# Patient Record
Sex: MALE | HISPANIC OR LATINO | Employment: FULL TIME | URBAN - NONMETROPOLITAN AREA
[De-identification: names, ages, dates, MRNs, and addresses within clinical notes are randomized per-mention and may not be internally consistent; named-entity substitution may affect disease eponyms.]

---

## 2017-01-05 ENCOUNTER — OFFICE VISIT (OUTPATIENT)
Dept: GASTROENTEROLOGY | Facility: CLINIC | Age: 57
End: 2017-01-05

## 2017-01-05 VITALS
SYSTOLIC BLOOD PRESSURE: 156 MMHG | HEART RATE: 84 BPM | HEIGHT: 66 IN | TEMPERATURE: 98.2 F | DIASTOLIC BLOOD PRESSURE: 82 MMHG | BODY MASS INDEX: 28.77 KG/M2 | WEIGHT: 179 LBS

## 2017-01-05 DIAGNOSIS — Z00.00 HEALTHCARE MAINTENANCE: ICD-10-CM

## 2017-01-05 DIAGNOSIS — Z87.19 HISTORY OF PANCREATITIS: Primary | ICD-10-CM

## 2017-01-05 PROCEDURE — 99213 OFFICE O/P EST LOW 20 MIN: CPT | Performed by: NURSE PRACTITIONER

## 2017-01-05 NOTE — PROGRESS NOTES
[facility] GASTROENTEROLOGY - OFFICE NOTE    1/5/2017    Amadeo Mercado   1960    Chief Complaint   Patient presents with   • Pancreatitis         HISTORY OF PRESENT ILLNESS    Amadeo Mercado is a 56 y.o. male presents for f/u pancreatitis secondary to etoh.  States had 2 episodes of pancreatitis, and states last was when hospitalized 3/2016. We did see him at that time.  Was thought  most likely secondary to etoh. He says stopped drinking etoh 2-3 mo ago but then goes on to tell me his last drink of etoh was ronnie day 2016.  He is asymptomatic, no abdominal pain.  No n/v. No fever. No unintentional wt loss. Appetite is good. He is diabetic , HA1C  Recently  9.6  3 wk ago,  TG's normal, lft normal., hgb 13.1, mcv normal, wbc 4.1 ( ordered by pcp ).  Had ct abd/pelvis 7/2015 and us abdomen 3/2016. Records reviewed.       He has never had cscope.  Denies change in bowel habits, constipation, diarrhea , or rectal bleeding. No family h/o colon cancer or polyps.      Past Medical History   Diagnosis Date   • DM (diabetes mellitus)    • ED (erectile dysfunction)    • ETOH abuse    • GERD (gastroesophageal reflux disease)    • Gout    • H. pylori infection    • Hepatic steatosis    • Hyperlipidemia    • Hypertension    • Pancreatitis, alcoholic, acute        Past Surgical History   Procedure Laterality Date   • Upper gastrointestinal endoscopy  03/2016     noting gastritis, duodenitis, esophagitis. dr whitten        Outpatient Prescriptions Marked as Taking for the 1/5/17 encounter (Office Visit) with PHONG Rivero   Medication Sig Dispense Refill   • glipiZIDE (GLUCOTROL) 10 MG tablet Take 1 tablet by mouth 2 (Two) Times a Day Before Meals. 60 tablet 3   • linagliptin (TRADJENTA) 5 MG tablet tablet Take 1 tablet by mouth Daily. 30 tablet 3   • lisinopril (PRINIVIL,ZESTRIL) 20 MG tablet Take 1 tablet by mouth Daily. 30 tablet 3   • lovastatin (MEVACOR) 20 MG tablet Take 1 tablet by mouth Every Night. 30 tablet 3    • magnesium oxide (MAGOX) 400 (241.3 MG) MG tablet tablet Take 1 tablet by mouth 2 (Two) Times a Day. 60 each 3   • metFORMIN (GLUCOPHAGE) 1000 MG tablet Take 1 tablet by mouth 2 (Two) Times a Day With Meals. 60 tablet 3       No Known Allergies    Social History     Social History   • Marital status:      Spouse name: N/A   • Number of children: N/A   • Years of education: N/A     Occupational History   • Not on file.     Social History Main Topics   • Smoking status: Never Smoker   • Smokeless tobacco: Not on file   • Alcohol use Yes      Comment: occ   • Drug use: No   • Sexual activity: Not on file     Other Topics Concern   • Not on file     Social History Narrative       History reviewed. No pertinent family history.    Review of Systems   Constitutional: Negative for appetite change, chills, fatigue, fever and unexpected weight change.   HENT: Negative for sore throat and trouble swallowing.    Eyes: Negative for pain and visual disturbance.   Respiratory: Negative for cough, chest tightness, shortness of breath and wheezing.    Cardiovascular: Negative for chest pain and palpitations.   Gastrointestinal: Negative for abdominal distention, abdominal pain, anal bleeding, blood in stool, constipation, diarrhea, nausea, rectal pain and vomiting.        As mentioned in hpi   Endocrine: Negative for cold intolerance and heat intolerance.   Genitourinary: Negative for difficulty urinating, dysuria and hematuria.   Musculoskeletal: Negative for arthralgias and back pain.   Skin: Negative for color change and rash.   Neurological: Negative for dizziness, tremors, seizures, syncope, light-headedness and headaches.   Hematological: Negative for adenopathy. Does not bruise/bleed easily.   Psychiatric/Behavioral: Negative for confusion. The patient is not nervous/anxious.        Vitals:    01/05/17 1339   BP: 156/82   BP Location: Left arm   Patient Position: Sitting   Cuff Size: Adult   Pulse: 84   Temp: 98.2  "°F (36.8 °C)   Weight: 179 lb (81.2 kg)   Height: 66\" (167.6 cm)      Body mass index is 28.89 kg/(m^2).    Physical Exam   Constitutional: He is oriented to person, place, and time. He appears well-developed and well-nourished. No distress.   HENT:   Head: Normocephalic and atraumatic.   Mouth/Throat: Oropharynx is clear and moist.   Eyes: Pupils are equal, round, and reactive to light. No scleral icterus.   Neck: Normal range of motion. Neck supple. No JVD present. No thyromegaly present.   Cardiovascular: Normal rate, regular rhythm, normal heart sounds and intact distal pulses.  Exam reveals no gallop and no friction rub.    No murmur heard.  Pulmonary/Chest: Effort normal and breath sounds normal. No respiratory distress. He has no wheezes.   Abdominal: Soft. Bowel sounds are normal. He exhibits no distension and no mass. There is no tenderness. There is no rebound and no guarding.   Musculoskeletal: Normal range of motion. He exhibits no edema or deformity.   Lymphadenopathy:     He has no cervical adenopathy.   Neurological: He is alert and oriented to person, place, and time. No cranial nerve deficit.   Skin: Skin is warm and dry. No rash noted.   Psychiatric: He has a normal mood and affect. His behavior is normal.   Vitals reviewed.              ASSESSMENT AND PLAN    Amadeo was seen today for pancreatitis.    Diagnoses and all orders for this visit:    History of pancreatitis  Comments:  secondary to etoh.     Healthcare maintenance      In regards to h/o pancreatitis, he is currently asymptomatic, states last episode was when hospitalized 3/2016.   Main recommendation is to avoid etoh, also rec keep blood sugars under control/compliance, especially with fatty liver as well.he verbalizes understanding. F/u here prn. Discussed case with dr whitten and he agrees with plan.       We also discussed importance of having screening colonoscopy to try to prevent colon cancer. He verbalizes understanding and he " declines at this time.      Body mass index is 28.89 kg/(m^2).     Return if symptoms worsen or fail to improve.            PHONG Mcneill/transcription disclaimer:  Much of this encounter note is electronic transcription/translation of spoken language to printed text.  The electronic translation of spoken language may be erroneous, or at times, nonsensical words or phrases may be inadvertently transcribed.  Although I have reviewed the note for such errors, some may still exist.

## 2017-01-05 NOTE — MR AVS SNAPSHOT
[facility] GASTROENTEROLOGY  [phone]                    Amadeo Mercado   1/5/2017 1:15 PM   Office Visit    Dept Phone:  [phone]   Encounter #:  [id number]    Provider:  PHONG Rivero   Department:  [facility] GASTROENTEROLOGY                Your Full Care Plan              Your Updated Medication List          This list is accurate as of: 1/5/17  2:02 PM.  Always use your most recent med list.                allopurinol 100 MG tablet   Commonly known as:  ZYLOPRIM   Take 1 tablet by mouth Daily.       colchicine 0.6 MG tablet       glipiZIDE 10 MG tablet   Commonly known as:  GLUCOTROL   Take 1 tablet by mouth 2 (Two) Times a Day Before Meals.       linagliptin 5 MG tablet tablet   Commonly known as:  TRADJENTA   Take 1 tablet by mouth Daily.       lisinopril 20 MG tablet   Commonly known as:  PRINIVIL,ZESTRIL   Take 1 tablet by mouth Daily.       lovastatin 20 MG tablet   Commonly known as:  MEVACOR   Take 1 tablet by mouth Every Night.       magnesium oxide 400 (241.3 MG) MG tablet tablet   Commonly known as:  MAGOX   Take 1 tablet by mouth 2 (Two) Times a Day.       metFORMIN 1000 MG tablet   Commonly known as:  GLUCOPHAGE   Take 1 tablet by mouth 2 (Two) Times a Day With Meals.       tadalafil 10 MG tablet   Commonly known as:  CIALIS   Take 1 tablet by mouth Daily As Needed for erectile dysfunction.               You Were Diagnosed With        Codes Comments    History of pancreatitis    -  Primary ICD-10-CM: Z87.19  ICD-9-CM: V12.79 secondary to etoh.       Instructions     None    Patient Instructions History      Upcoming Appointments     Visit Type Date Time Department    NEW PATIENT 1/5/2017  1:15 PM [facility] GASTRO [facility]    FOLLOW UP 3/23/2017  3:30 PM [facility]      [facility] Signup     Our records indicate that you have declined Psychiatric MyChart signup. If you would like to sign up for [employer], please email [email or web address] or call  "[phone] to obtain an activation code.             Other Info from Your Visit           Your Appointments     Mar 23, 2017  3:30 PM CDT   Follow Up with Will Servin DO   [facility] (--)    [address] [place] [id number] [phone]           Arrive 15 minutes prior to appointment.              Allergies     No Known Allergies      Reason for Visit     Pancreatitis           Vital Signs     Blood Pressure Pulse Temperature Height Weight Body Mass Index    156/82 (BP Location: Left arm, Patient Position: Sitting, Cuff Size: Adult) 84 98.2 °F (36.8 °C) 66\" (167.6 cm) 179 lb (81.2 kg) 28.89 kg/m2    Smoking Status                   Never Smoker           Problems and Diagnoses Noted     History of pancreatitis    -  Primary        "

## 2017-01-06 PROBLEM — Z87.19 HISTORY OF PANCREATITIS: Status: ACTIVE | Noted: 2017-01-06

## 2017-04-04 ENCOUNTER — OFFICE VISIT (OUTPATIENT)
Dept: INTERNAL MEDICINE | Facility: CLINIC | Age: 57
End: 2017-04-04

## 2017-04-04 VITALS
BODY MASS INDEX: 28.66 KG/M2 | RESPIRATION RATE: 16 BRPM | OXYGEN SATURATION: 97 % | HEIGHT: 66 IN | SYSTOLIC BLOOD PRESSURE: 130 MMHG | HEART RATE: 90 BPM | WEIGHT: 178.3 LBS | DIASTOLIC BLOOD PRESSURE: 80 MMHG

## 2017-04-04 DIAGNOSIS — N52.9 ERECTILE DYSFUNCTION, UNSPECIFIED ERECTILE DYSFUNCTION TYPE: ICD-10-CM

## 2017-04-04 DIAGNOSIS — I10 ESSENTIAL HYPERTENSION: Primary | ICD-10-CM

## 2017-04-04 DIAGNOSIS — E78.2 MIXED HYPERLIPIDEMIA: ICD-10-CM

## 2017-04-04 DIAGNOSIS — E11.9 TYPE 2 DIABETES MELLITUS WITHOUT COMPLICATION, WITHOUT LONG-TERM CURRENT USE OF INSULIN (HCC): ICD-10-CM

## 2017-04-04 DIAGNOSIS — E11.9 DIABETES MELLITUS TYPE 2, NONINSULIN DEPENDENT (HCC): ICD-10-CM

## 2017-04-04 DIAGNOSIS — M10.00 IDIOPATHIC GOUT, UNSPECIFIED CHRONICITY, UNSPECIFIED SITE: ICD-10-CM

## 2017-04-04 LAB — HBA1C MFR BLD: 9.2 %

## 2017-04-04 PROCEDURE — 99214 OFFICE O/P EST MOD 30 MIN: CPT | Performed by: INTERNAL MEDICINE

## 2017-04-04 PROCEDURE — 83036 HEMOGLOBIN GLYCOSYLATED A1C: CPT | Performed by: INTERNAL MEDICINE

## 2017-04-04 RX ORDER — SILDENAFIL CITRATE 20 MG/1
TABLET ORAL
Qty: 30 TABLET | Refills: 1 | Status: SHIPPED | OUTPATIENT
Start: 2017-04-04 | End: 2018-07-03

## 2017-04-04 RX ORDER — LOVASTATIN 20 MG/1
20 TABLET ORAL NIGHTLY
Qty: 30 TABLET | Refills: 3 | Status: SHIPPED | OUTPATIENT
Start: 2017-04-04 | End: 2018-07-03

## 2017-04-04 RX ORDER — SILDENAFIL CITRATE 20 MG/1
TABLET ORAL
Qty: 30 TABLET | Refills: 1 | Status: SHIPPED | OUTPATIENT
Start: 2017-04-04 | End: 2017-04-04 | Stop reason: SDUPTHER

## 2017-04-04 RX ORDER — LISINOPRIL 20 MG/1
20 TABLET ORAL DAILY
Qty: 30 TABLET | Refills: 3 | Status: SHIPPED | OUTPATIENT
Start: 2017-04-04 | End: 2018-07-03

## 2017-04-04 RX ORDER — ALLOPURINOL 100 MG/1
100 TABLET ORAL DAILY
Qty: 30 TABLET | Refills: 3 | Status: SHIPPED | OUTPATIENT
Start: 2017-04-04 | End: 2018-07-03

## 2017-04-04 RX ORDER — GLIPIZIDE 10 MG/1
10 TABLET ORAL
Qty: 60 TABLET | Refills: 3 | Status: SHIPPED | OUTPATIENT
Start: 2017-04-04 | End: 2018-07-03

## 2017-04-04 NOTE — PROGRESS NOTES
CC: f/u diabetes    History:  Amadeo Mercado is a 57 y.o. male who presents today for follow-up for evaluation of the above:  He reports he has been doing reasonably well without any acute illness. He reports he has been drinking only occasionally and has been trying to cut back even further. He reports he has not been checking sugars on a regular basis, but has tolerated his medications well without side effects. He has not been adhering to a strict diet for the purpose of glycemic management. He has had issues with erectile dysfunction and has tried Cialis in the past. He wonders about the possibility of being prescribed this type of medication. He continues on lisiinopril with good contorl of his BP without side effects.    ROS:  Review of Systems   Constitutional: Negative for chills and fever.   HENT: Negative for congestion and sore throat.    Respiratory: Negative for cough and shortness of breath.    Cardiovascular: Negative for chest pain and palpitations.   Gastrointestinal: Negative for abdominal pain, constipation and nausea.   Musculoskeletal: Negative for back pain and gait problem.       Mr. Mercado  reports that he has never smoked. He does not have any smokeless tobacco history on file. He reports that he drinks alcohol. He reports that he does not use illicit drugs.      Current Outpatient Prescriptions:   •  allopurinol (ZYLOPRIM) 100 MG tablet, Take 1 tablet by mouth Daily., Disp: 30 tablet, Rfl: 3  •  colchicine 0.6 MG tablet, Take 0.6 mg by mouth Daily., Disp: , Rfl:   •  glipiZIDE (GLUCOTROL) 10 MG tablet, Take 1 tablet by mouth 2 (Two) Times a Day Before Meals., Disp: 60 tablet, Rfl: 3  •  linagliptin (TRADJENTA) 5 MG tablet tablet, Take 1 tablet by mouth Daily., Disp: 30 tablet, Rfl: 3  •  lisinopril (PRINIVIL,ZESTRIL) 20 MG tablet, Take 1 tablet by mouth Daily., Disp: 30 tablet, Rfl: 3  •  lovastatin (MEVACOR) 20 MG tablet, Take 1 tablet by mouth Every Night., Disp: 30 tablet, Rfl: 3  •   "magnesium oxide (MAGOX) 400 (241.3 MG) MG tablet tablet, Take 1 tablet by mouth 2 (Two) Times a Day., Disp: 60 each, Rfl: 3  •  metFORMIN (GLUCOPHAGE) 1000 MG tablet, Take 1 tablet by mouth 2 (Two) Times a Day With Meals., Disp: 60 tablet, Rfl: 3      OBJECTIVE:  /80 (BP Location: Left arm, Patient Position: Sitting, Cuff Size: Adult)  Pulse 90  Resp 16  Ht 66\" (167.6 cm)  Wt 178 lb 4.8 oz (80.9 kg)  SpO2 97%  BMI 28.78 kg/m2   Physical Exam   Constitutional: He is oriented to person, place, and time. He appears well-nourished. No distress.   Cardiovascular: Normal rate, regular rhythm and normal heart sounds.    No murmur heard.  Pulmonary/Chest: Effort normal and breath sounds normal. He has no wheezes.   Abdominal: Soft. There is no tenderness.   Neurological: He is alert and oriented to person, place, and time.   Psychiatric: He has a normal mood and affect.       Assessment/Plan    Diagnoses and all orders for this visit:    Essential hypertension  -     lisinopril (PRINIVIL,ZESTRIL) 20 MG tablet; Take 1 tablet by mouth Daily.  Well controlled, BP goal for age is <140/90 per JNC 8 guidelines and continue current medications     Type 2 diabetes mellitus without complication, without long-term current use of insulin  -     POC Glycosylated Hemoglobin (Hb A1C)  -     Insulin Glargine (LANTUS SOLOSTAR) 100 UNIT/ML injection pen; Inject 10 Units under the skin Every Night.  -     metFORMIN (GLUCOPHAGE) 1000 MG tablet; Take 1 tablet by mouth 2 (Two) Times a Day With Meals.  -     glipiZIDE (GLUCOTROL) 10 MG tablet; Take 1 tablet by mouth 2 (Two) Times a Day Before Meals.  -     linagliptin (TRADJENTA) 5 MG tablet tablet; Take 1 tablet by mouth Daily.  A1c is 9.2% in the office today. This is stable from previous values and uncontrolled. He states he will again try to do better with his diet, but we will prescribe Lantus at this time and he will see what the cost of this medication is. He is also given an " Rx for pen needles, glucometer and strips for the purpose of checking sugars and using insulin nightly. He is on ACE-I and statin therapy. Recommended yearly eye exam.    Erectile dysfunction, unspecified erectile dysfunction type  -     sildenafil (REVATIO) 20 MG tablet; Take 2-3 tabs PO daily prn for sexual dysfunction.  He understands he will need to pay cash for these.    Idiopathic gout, unspecified chronicity, unspecified site  -     allopurinol (ZYLOPRIM) 100 MG tablet; Take 1 tablet by mouth Daily.    Mixed hyperlipidemia  -     lovastatin (MEVACOR) 20 MG tablet; Take 1 tablet by mouth Every Night.      An After Visit Summary was printed and given to the patient at discharge.  Return in about 3 months (around 7/4/2017) for Recheck. Sooner if problems arise.         Will Servin D.O. 4/4/2017

## 2017-04-24 ENCOUNTER — APPOINTMENT (OUTPATIENT)
Dept: GENERAL RADIOLOGY | Facility: HOSPITAL | Age: 57
End: 2017-04-24

## 2017-04-24 ENCOUNTER — HOSPITAL ENCOUNTER (EMERGENCY)
Facility: HOSPITAL | Age: 57
Discharge: HOME | End: 2017-04-24
Attending: FAMILY MEDICINE | Admitting: FAMILY MEDICINE

## 2017-04-24 VITALS
TEMPERATURE: 98.1 F | DIASTOLIC BLOOD PRESSURE: 85 MMHG | HEIGHT: 66 IN | RESPIRATION RATE: 17 BRPM | HEART RATE: 87 BPM | SYSTOLIC BLOOD PRESSURE: 147 MMHG | OXYGEN SATURATION: 99 % | BODY MASS INDEX: 28.93 KG/M2 | WEIGHT: 180 LBS

## 2017-04-24 DIAGNOSIS — R07.89 CHEST WALL DISCOMFORT: Primary | ICD-10-CM

## 2017-04-24 PROCEDURE — 99283 EMERGENCY DEPT VISIT LOW MDM: CPT

## 2017-04-24 PROCEDURE — 93010 ELECTROCARDIOGRAM REPORT: CPT | Performed by: INTERNAL MEDICINE

## 2017-04-24 PROCEDURE — 71010 HC CHEST PA OR AP: CPT

## 2017-04-24 PROCEDURE — 93005 ELECTROCARDIOGRAM TRACING: CPT | Performed by: FAMILY MEDICINE

## 2017-04-24 PROCEDURE — 71120 X-RAY EXAM BREASTBONE 2/>VWS: CPT

## 2017-04-24 RX ORDER — HYDROCODONE BITARTRATE AND ACETAMINOPHEN 5; 325 MG/1; MG/1
1 TABLET ORAL EVERY 4 HOURS PRN
Qty: 12 TABLET | Refills: 0 | Status: SHIPPED | OUTPATIENT
Start: 2017-04-24 | End: 2018-07-03

## 2017-04-24 RX ORDER — HYDROCODONE BITARTRATE AND ACETAMINOPHEN 7.5; 325 MG/1; MG/1
1 TABLET ORAL ONCE
Status: COMPLETED | OUTPATIENT
Start: 2017-04-24 | End: 2017-04-24

## 2017-04-24 RX ORDER — ONDANSETRON 4 MG/1
4 TABLET, ORALLY DISINTEGRATING ORAL ONCE
Status: COMPLETED | OUTPATIENT
Start: 2017-04-24 | End: 2017-04-24

## 2017-04-24 RX ORDER — ONDANSETRON 4 MG/1
4 TABLET, ORALLY DISINTEGRATING ORAL 4 TIMES DAILY PRN
Qty: 20 TABLET | Refills: 0 | Status: SHIPPED | OUTPATIENT
Start: 2017-04-24 | End: 2018-07-03

## 2017-04-24 RX ADMIN — ONDANSETRON 4 MG: 4 TABLET, ORALLY DISINTEGRATING ORAL at 21:23

## 2017-04-24 RX ADMIN — HYDROCODONE BITARTRATE AND ACETAMINOPHEN 1 TABLET: 7.5; 325 TABLET ORAL at 21:22
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
## 2017-06-05 ENCOUNTER — HOSPITAL ENCOUNTER (EMERGENCY)
Age: 57
Discharge: HOME | End: 2017-06-05

## 2017-06-05 VITALS
SYSTOLIC BLOOD PRESSURE: 129 MMHG | OXYGEN SATURATION: 92 % | HEIGHT: 66 IN | DIASTOLIC BLOOD PRESSURE: 81 MMHG | BODY MASS INDEX: 28.93 KG/M2 | HEART RATE: 87 BPM | TEMPERATURE: 98.7 F | RESPIRATION RATE: 17 BRPM | WEIGHT: 180 LBS

## 2017-06-05 LAB
ALBUMIN SERPL-MCNC: 4.2 G/DL (ref 3.5–5.2)
ALP BLD-CCNC: 122 U/L (ref 40–130)
ALT SERPL-CCNC: 18 U/L (ref 5–41)
AMPHETAMINE SCREEN, URINE: NEGATIVE
ANION GAP SERPL CALCULATED.3IONS-SCNC: 19 MMOL/L (ref 7–19)
AST SERPL-CCNC: 22 U/L (ref 5–40)
BARBITURATE SCREEN URINE: NEGATIVE
BENZODIAZEPINE SCREEN, URINE: NEGATIVE
BILIRUB SERPL-MCNC: <0.2 MG/DL (ref 0.2–1.2)
BUN BLDV-MCNC: 10 MG/DL (ref 6–20)
CALCIUM SERPL-MCNC: 8.7 MG/DL (ref 8.6–10)
CANNABINOID SCREEN URINE: NEGATIVE
CHLORIDE BLD-SCNC: 97 MMOL/L (ref 98–111)
CO2: 23 MMOL/L (ref 22–29)
COCAINE METABOLITE SCREEN URINE: NEGATIVE
CREAT SERPL-MCNC: 0.7 MG/DL (ref 0.5–1.2)
ETHANOL: 268 MG/DL (ref 0–0.08)
GFR NON-AFRICAN AMERICAN: >60
GLUCOSE BLD-MCNC: 368 MG/DL (ref 74–109)
HCT VFR BLD CALC: 41.1 % (ref 42–52)
HEMOGLOBIN: 14.3 G/DL (ref 14–18)
Lab: NORMAL
MCH RBC QN AUTO: 32.2 PG (ref 27–31)
MCHC RBC AUTO-ENTMCNC: 34.8 G/DL (ref 33–37)
MCV RBC AUTO: 92.6 FL (ref 80–94)
OPIATE SCREEN URINE: NEGATIVE
PDW BLD-RTO: 12.6 % (ref 11.5–14.5)
PLATELET # BLD: 221 K/UL (ref 130–400)
PMV BLD AUTO: 10.2 FL (ref 9.4–12.4)
POTASSIUM SERPL-SCNC: 4.1 MMOL/L (ref 3.5–5)
RBC # BLD: 4.44 M/UL (ref 4.7–6.1)
SODIUM BLD-SCNC: 139 MMOL/L (ref 136–145)
TOTAL PROTEIN: 7 G/DL (ref 6.6–8.7)
WBC # BLD: 7.5 K/UL (ref 4.8–10.8)

## 2017-06-05 PROCEDURE — 99283 EMERGENCY DEPT VISIT LOW MDM: CPT | Performed by: NURSE PRACTITIONER

## 2017-06-05 PROCEDURE — 6360000002 HC RX W HCPCS: Performed by: NURSE PRACTITIONER

## 2017-06-05 PROCEDURE — 99284 EMERGENCY DEPT VISIT MOD MDM: CPT

## 2017-06-05 PROCEDURE — 80053 COMPREHEN METABOLIC PANEL: CPT

## 2017-06-05 PROCEDURE — 2500000003 HC RX 250 WO HCPCS: Performed by: NURSE PRACTITIONER

## 2017-06-05 PROCEDURE — 36415 COLL VENOUS BLD VENIPUNCTURE: CPT

## 2017-06-05 PROCEDURE — 85027 COMPLETE CBC AUTOMATED: CPT

## 2017-06-05 PROCEDURE — G0480 DRUG TEST DEF 1-7 CLASSES: HCPCS

## 2017-06-05 PROCEDURE — 2580000003 HC RX 258: Performed by: NURSE PRACTITIONER

## 2017-06-05 PROCEDURE — 80307 DRUG TEST PRSMV CHEM ANLYZR: CPT

## 2017-06-05 PROCEDURE — 96365 THER/PROPH/DIAG IV INF INIT: CPT

## 2017-06-05 RX ORDER — ONDANSETRON 4 MG/1
4 TABLET, ORALLY DISINTEGRATING ORAL EVERY 8 HOURS PRN
Qty: 20 TABLET | Refills: 0 | Status: SHIPPED | OUTPATIENT
Start: 2017-06-05

## 2017-06-05 RX ORDER — DIAZEPAM 5 MG/1
5 TABLET ORAL EVERY 6 HOURS PRN
Qty: 20 TABLET | Refills: 0 | Status: SHIPPED | OUTPATIENT
Start: 2017-06-05

## 2017-06-05 RX ORDER — ALLOPURINOL 100 MG/1
100 TABLET ORAL DAILY
COMMUNITY

## 2017-06-05 RX ORDER — LOVASTATIN 20 MG/1
20 TABLET ORAL NIGHTLY
COMMUNITY

## 2017-06-05 RX ORDER — LISINOPRIL 20 MG/1
20 TABLET ORAL DAILY
COMMUNITY

## 2017-06-05 RX ORDER — GLIPIZIDE 10 MG/1
10 TABLET ORAL
COMMUNITY

## 2017-06-05 RX ORDER — MAGNESIUM OXIDE 400 MG/1
400 TABLET ORAL DAILY
COMMUNITY

## 2017-06-05 RX ADMIN — FOLIC ACID: 5 INJECTION, SOLUTION INTRAMUSCULAR; INTRAVENOUS; SUBCUTANEOUS at 20:10

## 2017-06-05 ASSESSMENT — ENCOUNTER SYMPTOMS: RESPIRATORY NEGATIVE: 1

## 2018-07-03 PROCEDURE — 80061 LIPID PANEL: CPT | Performed by: NURSE PRACTITIONER

## 2018-07-03 PROCEDURE — 83036 HEMOGLOBIN GLYCOSYLATED A1C: CPT | Performed by: NURSE PRACTITIONER

## 2018-07-03 PROCEDURE — 83735 ASSAY OF MAGNESIUM: CPT | Performed by: NURSE PRACTITIONER

## 2018-07-03 PROCEDURE — 80053 COMPREHEN METABOLIC PANEL: CPT | Performed by: NURSE PRACTITIONER

## 2018-07-03 PROCEDURE — 85025 COMPLETE CBC W/AUTO DIFF WBC: CPT | Performed by: NURSE PRACTITIONER

## 2018-07-25 ENCOUNTER — TELEPHONE (OUTPATIENT)
Dept: URGENT CARE | Facility: CLINIC | Age: 58
End: 2018-07-25

## 2018-07-25 NOTE — TELEPHONE ENCOUNTER
[facility] called ([facility]) stating pt needs refill until [facility] appt on august 13th. Spoke to  and will refill mediation for one month. Called in lisinopril, baslgar insulin, lovastatin, glipizide, metformin,and allopurinol. Called in to [facility]. Notified [facility] we will refill for one month.

## 2018-07-30 ENCOUNTER — TELEPHONE (OUTPATIENT)
Dept: URGENT CARE | Facility: CLINIC | Age: 58
End: 2018-07-30

## 2018-07-30 DIAGNOSIS — M10.9 ACUTE GOUT, UNSPECIFIED CAUSE, UNSPECIFIED SITE: Primary | ICD-10-CM

## 2018-07-30 RX ORDER — COLCHICINE 0.6 MG/1
0.6 TABLET ORAL DAILY
Qty: 6 TABLET | Refills: 0 | Status: SHIPPED | OUTPATIENT
Start: 2018-07-30 | End: 2020-07-23 | Stop reason: HOSPADM

## 2018-07-30 NOTE — TELEPHONE ENCOUNTER
Laura from [facility] called saying patient is having a gout flair, she wants to know if we can call in Colchicine to [place]? Patient has pcp appt with Eileen at [facility] on August 13th. We filled all his medications for a month on the 25th.   Can we call this in or does he need to come in?

## 2018-11-03 ENCOUNTER — HOSPITAL ENCOUNTER (EMERGENCY)
Facility: HOSPITAL | Age: 58
Discharge: HOME | End: 2018-11-03
Admitting: EMERGENCY MEDICINE

## 2018-11-03 ENCOUNTER — APPOINTMENT (OUTPATIENT)
Dept: GENERAL RADIOLOGY | Facility: HOSPITAL | Age: 58
End: 2018-11-03

## 2018-11-03 VITALS
SYSTOLIC BLOOD PRESSURE: 145 MMHG | WEIGHT: 199.6 LBS | BODY MASS INDEX: 32.08 KG/M2 | RESPIRATION RATE: 16 BRPM | HEIGHT: 66 IN | TEMPERATURE: 97.8 F | DIASTOLIC BLOOD PRESSURE: 70 MMHG | HEART RATE: 90 BPM | OXYGEN SATURATION: 99 %

## 2018-11-03 DIAGNOSIS — J40 BRONCHITIS: Primary | ICD-10-CM

## 2018-11-03 DIAGNOSIS — J02.9 PHARYNGITIS, UNSPECIFIED ETIOLOGY: ICD-10-CM

## 2018-11-03 PROCEDURE — 71046 X-RAY EXAM CHEST 2 VIEWS: CPT

## 2018-11-03 PROCEDURE — 94640 AIRWAY INHALATION TREATMENT: CPT

## 2018-11-03 PROCEDURE — 94760 N-INVAS EAR/PLS OXIMETRY 1: CPT

## 2018-11-03 PROCEDURE — 94799 UNLISTED PULMONARY SVC/PX: CPT

## 2018-11-03 PROCEDURE — 99283 EMERGENCY DEPT VISIT LOW MDM: CPT

## 2018-11-03 RX ORDER — ALBUTEROL SULFATE 90 UG/1
2 AEROSOL, METERED RESPIRATORY (INHALATION) EVERY 4 HOURS PRN
Qty: 1 INHALER | Refills: 1 | Status: SHIPPED | OUTPATIENT
Start: 2018-11-03 | End: 2018-11-08

## 2018-11-03 RX ORDER — ALBUTEROL SULFATE 2.5 MG/3ML
2.5 SOLUTION RESPIRATORY (INHALATION) ONCE
Status: COMPLETED | OUTPATIENT
Start: 2018-11-03 | End: 2018-11-03

## 2018-11-03 RX ORDER — CETIRIZINE HYDROCHLORIDE 10 MG/1
10 TABLET ORAL DAILY
COMMUNITY
End: 2022-06-14

## 2018-11-03 RX ORDER — GUAIFENESIN AND DEXTROMETHORPHAN HYDROBROMIDE 600; 30 MG/1; MG/1
1 TABLET, EXTENDED RELEASE ORAL 2 TIMES DAILY PRN
Qty: 10 TABLET | Refills: 0 | Status: SHIPPED | OUTPATIENT
Start: 2018-11-03 | End: 2018-11-08

## 2018-11-03 RX ORDER — DICLOFENAC SODIUM 75 MG/1
75 TABLET, DELAYED RELEASE ORAL 2 TIMES DAILY
COMMUNITY
End: 2020-07-23 | Stop reason: HOSPADM

## 2018-11-03 RX ADMIN — ALBUTEROL SULFATE 2.5 MG: 2.5 SOLUTION RESPIRATORY (INHALATION) at 09:02

## 2018-11-03 NOTE — DISCHARGE INSTRUCTIONS
You have upper respiratory infection that has progressed to bronchitis. This is treated with inhaler and symptom control. Please take the mucinex, inhaler, and use tylenol, throat lozenges and tea to soothe your throat. Remember you are likely to have a cough for up to 2 full weeks. Please follow up with a PCP for further evaluation on Monday. Return to the ER sooner if you develop any new, worsening or other concerning sx such as:        You have difficulty breathing.  You cannot swallow fluids, soft foods, or your saliva.  You have increased swelling in your throat or neck.  You have persistent nausea and vomiting.    Follow up with one of the [facility] physician groups below to setup primary care. If you have trouble following up, please call the [facility] Nurse Line at [phone]    (Dr. Delvin Soto DO, Dr. Will Servin DO,  PHONG Carl, and PHONG Frank)  [facility], Primary Care   [address] [place] [address], [address], [place] [address] [phone]     (Dr. Karen Vargas MD, PHONG Flowers, and PHONG Garnica)  [facility], Primary Care   [address] [place] [id number] [phone]    (Dr. Nile Palafox MD and Dr. Nando Marquez MD)  [facility], Primary Care  [address], [address] [phone]    (Dr. Bogdan Carrillo MD)  [facility], Primary Care  [address] [place] [address], [address], [place], [place], [address] [phone]

## 2018-11-03 NOTE — ED PROVIDER NOTES
Subjective   58-year-old with past medical history of diabetes, presents to the emergency department with 1 day of sore throat and cough. Pt states gradual onset of nasal congestion for last few days and then developed cough 1 day ago. Pt reports small amount of green yellow production. Pt admits to mild bilateral lower rib discomfort when coughing. Pt denies cp, difficulty managing saliva, sob, fever, chills, palpitations, hempptysis. Pt also admits to beginning smoking 3 days ago. Pt has no other complaints at this time.         History provided by:  Patient   used: No    Cough   Cough characteristics:  Productive  Sputum characteristics:  Green  Severity:  Moderate  Onset quality:  Gradual  Duration:  1 day  Timing:  Constant  Context: sick contacts and upper respiratory infection    Associated symptoms: sinus congestion and sore throat    Associated symptoms: no chest pain, no chills, no diaphoresis, no ear fullness, no ear pain, no fever, no myalgias, no rhinorrhea, no shortness of breath and no wheezing        Review of Systems   Constitutional: Negative for chills, diaphoresis, fatigue and fever.   HENT: Positive for sore throat. Negative for congestion, ear pain, rhinorrhea and trouble swallowing.    Respiratory: Positive for cough. Negative for shortness of breath and wheezing.    Cardiovascular: Negative for chest pain and palpitations.   Gastrointestinal: Negative for abdominal pain, diarrhea and nausea.   Genitourinary: Negative for dysuria.   Musculoskeletal: Negative for arthralgias and myalgias.   Neurological: Negative for dizziness and numbness.   Hematological: Negative for adenopathy. Does not bruise/bleed easily.       Past Medical History:   Diagnosis Date   • DM (diabetes mellitus) (CMS/HCC)    • ED (erectile dysfunction)    • ETOH abuse    • GERD (gastroesophageal reflux disease)    • Gout    • H. pylori infection    • Hepatic steatosis    • Hyperlipidemia    • Hypertension     • Pancreatitis, alcoholic, acute        No Known Allergies    Past Surgical History:   Procedure Laterality Date   • UPPER GASTROINTESTINAL ENDOSCOPY  03/2016    noting gastritis, duodenitis, esophagitis. dr whitten        History reviewed. No pertinent family history.    Social History     Social History   • Marital status:      Social History Main Topics   • Smoking status: Never Smoker   • Smokeless tobacco: Never Used   • Alcohol use Yes      Comment: occ   • Drug use: No   • Sexual activity: Defer     Other Topics Concern   • Not on file           Objective   Physical Exam   Constitutional: He is oriented to person, place, and time. He appears well-developed and well-nourished. No distress.   HENT:   Head: Normocephalic and atraumatic.   Right Ear: Hearing, tympanic membrane, external ear and ear canal normal.   Left Ear: Hearing, tympanic membrane, external ear and ear canal normal.   Mouth/Throat: Uvula is midline and mucous membranes are normal. No trismus in the jaw. No uvula swelling. Posterior oropharyngeal erythema present. Tonsils are 0 on the right. Tonsils are 0 on the left. No tonsillar exudate.   Nasal congestion   Eyes: Pupils are equal, round, and reactive to light. Conjunctivae and EOM are normal. Right eye exhibits no discharge. Left eye exhibits no discharge. No scleral icterus.   Neck: Normal range of motion. Neck supple. No tracheal deviation present. No thyromegaly present.   Cardiovascular: Normal rate, regular rhythm, normal heart sounds and intact distal pulses.  Exam reveals no friction rub.    No murmur heard.  Pulmonary/Chest: Effort normal and breath sounds normal. No respiratory distress. He has no wheezes.   Abdominal: Soft. Bowel sounds are normal. He exhibits no distension. There is no tenderness. There is no guarding.   Neurological: He is alert and oriented to person, place, and time.   Skin: Skin is warm and dry. Capillary refill takes less than 2 seconds. He is not  diaphoretic.   Psychiatric: He has a normal mood and affect. His behavior is normal.   Nursing note and vitals reviewed.      Procedures           ED Course  ED Course as of Nov 03 0914   Sat Nov 03, 2018   0842 Will give breathing tx.   [CP]   0849 Impression     1. No radiographic evidence of acute cardiopulmonary process.        This report was finalized on 11/03/2018 08:45 by Dr. Jeremiah Bernardo MD.  Lab and Collection     XR Chest 2 View on 11/3/2018     [CP]      ED Course User Index  [CP] Jareth Patel PA-C                  MDM  Number of Diagnoses or Management Options  Bronchitis:   Pharyngitis, unspecified etiology:   Diagnosis management comments: 57 yo with cough, sore throat onset 1 day. CXR clear, no trismus, no uvula deviation, centor negative.  no indications for labs at this time. Of note, pt did admit to starting smoking 3-4 days ago. Pt will be given mucinex, albuterol, and symptomatic tx for sore throat. VSS. No acute distress. Discussed return precautions. All questions answered.        Amount and/or Complexity of Data Reviewed  Tests in the radiology section of CPT®: ordered and reviewed    Risk of Complications, Morbidity, and/or Mortality  Presenting problems: low  Diagnostic procedures: low  Management options: low    Patient Progress  Patient progress: stable        Final diagnoses:   Bronchitis   Pharyngitis, unspecified etiology            Jareth Patel PA-C  11/03/18 0914

## 2019-01-30 ENCOUNTER — TRANSCRIBE ORDERS (OUTPATIENT)
Dept: ADMINISTRATIVE | Facility: HOSPITAL | Age: 59
End: 2019-01-30

## 2019-01-30 DIAGNOSIS — R10.10 UPPER ABDOMINAL PAIN: Primary | ICD-10-CM

## 2019-01-31 ENCOUNTER — HOSPITAL ENCOUNTER (OUTPATIENT)
Dept: ULTRASOUND IMAGING | Facility: HOSPITAL | Age: 59
Discharge: HOME | End: 2019-01-31
Admitting: INTERNAL MEDICINE

## 2019-01-31 DIAGNOSIS — R10.10 UPPER ABDOMINAL PAIN: ICD-10-CM

## 2019-01-31 PROCEDURE — 76700 US EXAM ABDOM COMPLETE: CPT

## 2019-02-22 ENCOUNTER — TRANSCRIBE ORDERS (OUTPATIENT)
Dept: ADMINISTRATIVE | Facility: HOSPITAL | Age: 59
End: 2019-02-22

## 2019-02-22 DIAGNOSIS — R10.10 UPPER ABDOMINAL PAIN: Primary | ICD-10-CM

## 2019-02-27 ENCOUNTER — HOSPITAL ENCOUNTER (OUTPATIENT)
Dept: NUCLEAR MEDICINE | Facility: HOSPITAL | Age: 59
Discharge: HOME | End: 2019-02-27

## 2019-02-27 PROCEDURE — 0 TECHNETIUM TC 99M MEBROFENIN KIT: Performed by: INTERNAL MEDICINE

## 2019-02-27 PROCEDURE — 78226 HEPATOBILIARY SYSTEM IMAGING: CPT

## 2019-02-27 PROCEDURE — A9537 TC99M MEBROFENIN: HCPCS | Performed by: INTERNAL MEDICINE

## 2019-02-27 RX ORDER — KIT FOR THE PREPARATION OF TECHNETIUM TC 99M MEBROFENIN 45 MG/10ML
1 INJECTION, POWDER, LYOPHILIZED, FOR SOLUTION INTRAVENOUS
Status: COMPLETED | OUTPATIENT
Start: 2019-02-27 | End: 2019-02-27

## 2019-02-27 RX ADMIN — MEBROFENIN 1 DOSE: 45 INJECTION, POWDER, LYOPHILIZED, FOR SOLUTION INTRAVENOUS at 12:00

## 2019-04-26 ENCOUNTER — APPOINTMENT (OUTPATIENT)
Dept: LAB | Facility: HOSPITAL | Age: 59
End: 2019-04-26

## 2019-04-26 ENCOUNTER — TRANSCRIBE ORDERS (OUTPATIENT)
Dept: ADMINISTRATIVE | Facility: HOSPITAL | Age: 59
End: 2019-04-26

## 2019-04-26 DIAGNOSIS — E87.6 HYPOKALEMIA: Primary | ICD-10-CM

## 2019-04-26 LAB
ANION GAP SERPL CALCULATED.3IONS-SCNC: 9 MMOL/L (ref 4–13)
BUN BLD-MCNC: 20 MG/DL (ref 5–21)
BUN/CREAT SERPL: 24.1 (ref 7–25)
CALCIUM SPEC-SCNC: 9.5 MG/DL (ref 8.4–10.4)
CHLORIDE SERPL-SCNC: 105 MMOL/L (ref 98–110)
CO2 SERPL-SCNC: 26 MMOL/L (ref 24–31)
CREAT BLD-MCNC: 0.83 MG/DL (ref 0.5–1.4)
GFR SERPL CREATININE-BSD FRML MDRD: 95 ML/MIN/1.73
GLUCOSE BLD-MCNC: 158 MG/DL (ref 70–100)
POTASSIUM BLD-SCNC: 4.9 MMOL/L (ref 3.5–5.3)
SODIUM BLD-SCNC: 140 MMOL/L (ref 135–145)

## 2019-04-26 PROCEDURE — 36415 COLL VENOUS BLD VENIPUNCTURE: CPT | Performed by: INTERNAL MEDICINE

## 2019-04-26 PROCEDURE — 80048 BASIC METABOLIC PNL TOTAL CA: CPT | Performed by: INTERNAL MEDICINE

## 2019-06-17 ENCOUNTER — TRANSCRIBE ORDERS (OUTPATIENT)
Dept: ADMINISTRATIVE | Facility: HOSPITAL | Age: 59
End: 2019-06-17

## 2019-06-17 ENCOUNTER — APPOINTMENT (OUTPATIENT)
Dept: LAB | Facility: HOSPITAL | Age: 59
End: 2019-06-17

## 2019-06-17 DIAGNOSIS — Q89.8 HYPEREKPLEXIA: Primary | ICD-10-CM

## 2019-06-17 LAB
ANION GAP SERPL CALCULATED.3IONS-SCNC: 7 MMOL/L (ref 4–13)
BUN BLD-MCNC: 38 MG/DL (ref 5–21)
BUN/CREAT SERPL: 31.9 (ref 7–25)
CALCIUM SPEC-SCNC: 9.4 MG/DL (ref 8.4–10.4)
CHLORIDE SERPL-SCNC: 108 MMOL/L (ref 98–110)
CO2 SERPL-SCNC: 24 MMOL/L (ref 24–31)
CREAT BLD-MCNC: 1.19 MG/DL (ref 0.5–1.4)
GFR SERPL CREATININE-BSD FRML MDRD: 63 ML/MIN/1.73
GLUCOSE BLD-MCNC: 90 MG/DL (ref 70–100)
POTASSIUM BLD-SCNC: 5.8 MMOL/L (ref 3.5–5.3)
SODIUM BLD-SCNC: 139 MMOL/L (ref 135–145)

## 2019-06-17 PROCEDURE — 36415 COLL VENOUS BLD VENIPUNCTURE: CPT | Performed by: INTERNAL MEDICINE

## 2019-06-17 PROCEDURE — 80048 BASIC METABOLIC PNL TOTAL CA: CPT | Performed by: INTERNAL MEDICINE

## 2020-07-12 ENCOUNTER — HOSPITAL ENCOUNTER (INPATIENT)
Facility: HOSPITAL | Age: 60
LOS: 1 days | Discharge: HOME | End: 2020-07-13
Attending: FAMILY MEDICINE | Admitting: INTERNAL MEDICINE

## 2020-07-12 ENCOUNTER — APPOINTMENT (OUTPATIENT)
Dept: GENERAL RADIOLOGY | Facility: HOSPITAL | Age: 60
End: 2020-07-12

## 2020-07-12 DIAGNOSIS — F10.21 HISTORY OF ALCOHOLISM (HCC): ICD-10-CM

## 2020-07-12 DIAGNOSIS — N17.9 ACUTE KIDNEY INJURY (HCC): ICD-10-CM

## 2020-07-12 DIAGNOSIS — E86.0 DEHYDRATION: ICD-10-CM

## 2020-07-12 DIAGNOSIS — E86.1 HYPOVOLEMIA: Primary | ICD-10-CM

## 2020-07-12 PROBLEM — K62.5 BRBPR (BRIGHT RED BLOOD PER RECTUM): Status: ACTIVE | Noted: 2020-07-12

## 2020-07-12 LAB
ACETONE BLD QL: NEGATIVE
ALBUMIN SERPL-MCNC: 4 G/DL (ref 3.5–5.2)
ALBUMIN/GLOB SERPL: 1.7 G/DL
ALP SERPL-CCNC: 82 U/L (ref 39–117)
ALT SERPL W P-5'-P-CCNC: 24 U/L (ref 1–41)
ANION GAP SERPL CALCULATED.3IONS-SCNC: 15 MMOL/L (ref 5–15)
ANION GAP SERPL CALCULATED.3IONS-SCNC: 19 MMOL/L (ref 5–15)
APTT PPP: 25.4 SECONDS (ref 24.1–35)
ARTERIAL PATENCY WRIST A: ABNORMAL
AST SERPL-CCNC: 21 U/L (ref 1–40)
ATMOSPHERIC PRESS: 746 MMHG
BACTERIA UR QL AUTO: ABNORMAL /HPF
BASE EXCESS BLDA CALC-SCNC: -1.3 MMOL/L (ref 0–2)
BASOPHILS # BLD AUTO: 0.1 10*3/MM3 (ref 0–0.2)
BASOPHILS NFR BLD AUTO: 1.3 % (ref 0–1.5)
BDY SITE: ABNORMAL
BILIRUB SERPL-MCNC: <0.2 MG/DL (ref 0–1.2)
BILIRUB UR QL STRIP: NEGATIVE
BODY TEMPERATURE: 37 C
BUN SERPL-MCNC: 38 MG/DL (ref 6–20)
BUN SERPL-MCNC: 45 MG/DL (ref 6–20)
BUN/CREAT SERPL: 16.9 (ref 7–25)
BUN/CREAT SERPL: 18.3 (ref 7–25)
CALCIUM SPEC-SCNC: 8 MG/DL (ref 8.6–10.5)
CALCIUM SPEC-SCNC: 8.7 MG/DL (ref 8.6–10.5)
CHLORIDE SERPL-SCNC: 103 MMOL/L (ref 98–107)
CHLORIDE SERPL-SCNC: 97 MMOL/L (ref 98–107)
CK SERPL-CCNC: 118 U/L (ref 20–200)
CLARITY UR: CLEAR
CO2 SERPL-SCNC: 23 MMOL/L (ref 22–29)
CO2 SERPL-SCNC: 24 MMOL/L (ref 22–29)
COLOR UR: YELLOW
CREAT SERPL-MCNC: 2.08 MG/DL (ref 0.76–1.27)
CREAT SERPL-MCNC: 2.67 MG/DL (ref 0.76–1.27)
DEPRECATED RDW RBC AUTO: 42.4 FL (ref 37–54)
DEVELOPER EXPIRATION DATE: ABNORMAL
DEVELOPER LOT NUMBER: 169
EOSINOPHIL # BLD AUTO: 0.62 10*3/MM3 (ref 0–0.4)
EOSINOPHIL NFR BLD AUTO: 8 % (ref 0.3–6.2)
ERYTHROCYTE [DISTWIDTH] IN BLOOD BY AUTOMATED COUNT: 13 % (ref 12.3–15.4)
ETHANOL UR QL: <0.01 %
EXPIRATION DATE: 169
FECAL OCCULT BLOOD SCREEN, POC: POSITIVE
GFR SERPL CREATININE-BSD FRML MDRD: 25 ML/MIN/1.73
GFR SERPL CREATININE-BSD FRML MDRD: 33 ML/MIN/1.73
GLOBULIN UR ELPH-MCNC: 2.4 GM/DL
GLUCOSE BLDC GLUCOMTR-MCNC: 104 MG/DL (ref 70–130)
GLUCOSE BLDC GLUCOMTR-MCNC: 153 MG/DL (ref 70–130)
GLUCOSE BLDC GLUCOMTR-MCNC: 164 MG/DL (ref 70–130)
GLUCOSE SERPL-MCNC: 128 MG/DL (ref 65–99)
GLUCOSE SERPL-MCNC: 215 MG/DL (ref 65–99)
GLUCOSE UR STRIP-MCNC: NEGATIVE MG/DL
HCO3 BLDA-SCNC: 23.4 MMOL/L (ref 20–26)
HCT VFR BLD AUTO: 36.1 % (ref 37.5–51)
HGB BLD-MCNC: 12.5 G/DL (ref 13–17.7)
HGB UR QL STRIP.AUTO: NEGATIVE
IMM GRANULOCYTES # BLD AUTO: 0.04 10*3/MM3 (ref 0–0.05)
IMM GRANULOCYTES NFR BLD AUTO: 0.5 % (ref 0–0.5)
INR PPP: 1.03 (ref 0.91–1.09)
KETONES UR QL STRIP: ABNORMAL
LEUKOCYTE ESTERASE UR QL STRIP.AUTO: NEGATIVE
LIPASE SERPL-CCNC: 116 U/L (ref 13–60)
LYMPHOCYTES # BLD AUTO: 1.54 10*3/MM3 (ref 0.7–3.1)
LYMPHOCYTES NFR BLD AUTO: 19.8 % (ref 19.6–45.3)
Lab: ABNORMAL
Lab: ABNORMAL
MAGNESIUM SERPL-MCNC: 2.2 MG/DL (ref 1.6–2.6)
MCH RBC QN AUTO: 31.5 PG (ref 26.6–33)
MCHC RBC AUTO-ENTMCNC: 34.6 G/DL (ref 31.5–35.7)
MCV RBC AUTO: 90.9 FL (ref 79–97)
MODALITY: ABNORMAL
MONOCYTES # BLD AUTO: 0.73 10*3/MM3 (ref 0.1–0.9)
MONOCYTES NFR BLD AUTO: 9.4 % (ref 5–12)
NEGATIVE CONTROL: NEGATIVE
NEUTROPHILS NFR BLD AUTO: 4.76 10*3/MM3 (ref 1.7–7)
NEUTROPHILS NFR BLD AUTO: 61 % (ref 42.7–76)
NITRITE UR QL STRIP: NEGATIVE
NRBC BLD AUTO-RTO: 0 /100 WBC (ref 0–0.2)
NT-PROBNP SERPL-MCNC: 136 PG/ML (ref 0–900)
PCO2 BLDA: 38.6 MM HG (ref 35–45)
PCO2 TEMP ADJ BLD: 38.6 MM HG (ref 35–45)
PH BLDA: 7.39 PH UNITS (ref 7.35–7.45)
PH UR STRIP.AUTO: <=5 [PH] (ref 5–8)
PH, TEMP CORRECTED: 7.39 PH UNITS (ref 7.35–7.45)
PHOSPHATE SERPL-MCNC: 5.5 MG/DL (ref 2.5–4.5)
PLATELET # BLD AUTO: 218 10*3/MM3 (ref 140–450)
PMV BLD AUTO: 10.2 FL (ref 6–12)
PO2 BLDA: 60 MM HG (ref 83–108)
PO2 TEMP ADJ BLD: 60 MM HG (ref 83–108)
POSITIVE CONTROL: POSITIVE
POTASSIUM SERPL-SCNC: 3.3 MMOL/L (ref 3.5–5.2)
POTASSIUM SERPL-SCNC: 3.7 MMOL/L (ref 3.5–5.2)
POTASSIUM SERPL-SCNC: 3.7 MMOL/L (ref 3.5–5.2)
PROT SERPL-MCNC: 6.4 G/DL (ref 6–8.5)
PROT UR QL STRIP: ABNORMAL
PROTHROMBIN TIME: 13.1 SECONDS (ref 11.9–14.6)
RBC # BLD AUTO: 3.97 10*6/MM3 (ref 4.14–5.8)
RBC # UR: ABNORMAL /HPF
REF LAB TEST METHOD: ABNORMAL
SAO2 % BLDCOA: 91.3 % (ref 94–99)
SARS-COV-2 RDRP RESP QL NAA+PROBE: NOT DETECTED
SODIUM SERPL-SCNC: 139 MMOL/L (ref 136–145)
SODIUM SERPL-SCNC: 142 MMOL/L (ref 136–145)
SP GR UR STRIP: 1.02 (ref 1–1.03)
SQUAMOUS #/AREA URNS HPF: ABNORMAL /HPF
TROPONIN T SERPL-MCNC: 0.01 NG/ML (ref 0–0.03)
UROBILINOGEN UR QL STRIP: ABNORMAL
VENTILATOR MODE: ABNORMAL
WBC # BLD AUTO: 7.79 10*3/MM3 (ref 3.4–10.8)
WBC UR QL AUTO: ABNORMAL /HPF

## 2020-07-12 PROCEDURE — 71045 X-RAY EXAM CHEST 1 VIEW: CPT

## 2020-07-12 PROCEDURE — 82962 GLUCOSE BLOOD TEST: CPT

## 2020-07-12 PROCEDURE — 84132 ASSAY OF SERUM POTASSIUM: CPT | Performed by: FAMILY MEDICINE

## 2020-07-12 PROCEDURE — 93010 ELECTROCARDIOGRAM REPORT: CPT | Performed by: INTERNAL MEDICINE

## 2020-07-12 PROCEDURE — 82270 OCCULT BLOOD FECES: CPT | Performed by: PHYSICIAN ASSISTANT

## 2020-07-12 PROCEDURE — 85610 PROTHROMBIN TIME: CPT | Performed by: PHYSICIAN ASSISTANT

## 2020-07-12 PROCEDURE — 81001 URINALYSIS AUTO W/SCOPE: CPT | Performed by: PHYSICIAN ASSISTANT

## 2020-07-12 PROCEDURE — 25010000002 LORAZEPAM PER 2 MG: Performed by: EMERGENCY MEDICINE

## 2020-07-12 PROCEDURE — 82009 KETONE BODYS QUAL: CPT | Performed by: PHYSICIAN ASSISTANT

## 2020-07-12 PROCEDURE — 80053 COMPREHEN METABOLIC PANEL: CPT | Performed by: PHYSICIAN ASSISTANT

## 2020-07-12 PROCEDURE — 87635 SARS-COV-2 COVID-19 AMP PRB: CPT | Performed by: PHYSICIAN ASSISTANT

## 2020-07-12 PROCEDURE — 83735 ASSAY OF MAGNESIUM: CPT | Performed by: FAMILY MEDICINE

## 2020-07-12 PROCEDURE — 82803 BLOOD GASES ANY COMBINATION: CPT

## 2020-07-12 PROCEDURE — 25010000002 THIAMINE PER 100 MG: Performed by: PHYSICIAN ASSISTANT

## 2020-07-12 PROCEDURE — 84484 ASSAY OF TROPONIN QUANT: CPT | Performed by: PHYSICIAN ASSISTANT

## 2020-07-12 PROCEDURE — 83690 ASSAY OF LIPASE: CPT | Performed by: PHYSICIAN ASSISTANT

## 2020-07-12 PROCEDURE — 99284 EMERGENCY DEPT VISIT MOD MDM: CPT

## 2020-07-12 PROCEDURE — 36600 WITHDRAWAL OF ARTERIAL BLOOD: CPT

## 2020-07-12 PROCEDURE — 82550 ASSAY OF CK (CPK): CPT | Performed by: PHYSICIAN ASSISTANT

## 2020-07-12 PROCEDURE — 85730 THROMBOPLASTIN TIME PARTIAL: CPT | Performed by: PHYSICIAN ASSISTANT

## 2020-07-12 PROCEDURE — 83880 ASSAY OF NATRIURETIC PEPTIDE: CPT | Performed by: PHYSICIAN ASSISTANT

## 2020-07-12 PROCEDURE — 84100 ASSAY OF PHOSPHORUS: CPT | Performed by: FAMILY MEDICINE

## 2020-07-12 PROCEDURE — 80307 DRUG TEST PRSMV CHEM ANLYZR: CPT | Performed by: PHYSICIAN ASSISTANT

## 2020-07-12 PROCEDURE — 85025 COMPLETE CBC W/AUTO DIFF WBC: CPT | Performed by: PHYSICIAN ASSISTANT

## 2020-07-12 PROCEDURE — 93005 ELECTROCARDIOGRAM TRACING: CPT | Performed by: PHYSICIAN ASSISTANT

## 2020-07-12 RX ORDER — LORAZEPAM 2 MG/ML
2 INJECTION INTRAMUSCULAR
Status: DISCONTINUED | OUTPATIENT
Start: 2020-07-12 | End: 2020-07-13 | Stop reason: HOSPADM

## 2020-07-12 RX ORDER — POTASSIUM CHLORIDE 1.5 G/1.77G
40 POWDER, FOR SOLUTION ORAL AS NEEDED
Status: DISCONTINUED | OUTPATIENT
Start: 2020-07-12 | End: 2020-07-13 | Stop reason: HOSPADM

## 2020-07-12 RX ORDER — ONDANSETRON 2 MG/ML
4 INJECTION INTRAMUSCULAR; INTRAVENOUS EVERY 6 HOURS PRN
Status: DISCONTINUED | OUTPATIENT
Start: 2020-07-12 | End: 2020-07-13 | Stop reason: HOSPADM

## 2020-07-12 RX ORDER — THIAMINE HYDROCHLORIDE 100 MG/ML
100 INJECTION, SOLUTION INTRAMUSCULAR; INTRAVENOUS ONCE
Status: COMPLETED | OUTPATIENT
Start: 2020-07-12 | End: 2020-07-12

## 2020-07-12 RX ORDER — LORAZEPAM 1 MG/1
2 TABLET ORAL
Status: DISCONTINUED | OUTPATIENT
Start: 2020-07-12 | End: 2020-07-13 | Stop reason: HOSPADM

## 2020-07-12 RX ORDER — CETIRIZINE HYDROCHLORIDE 10 MG/1
10 TABLET ORAL DAILY
Status: DISCONTINUED | OUTPATIENT
Start: 2020-07-13 | End: 2020-07-13 | Stop reason: HOSPADM

## 2020-07-12 RX ORDER — COLCHICINE 0.6 MG/1
0.6 TABLET ORAL DAILY
Status: DISCONTINUED | OUTPATIENT
Start: 2020-07-12 | End: 2020-07-13 | Stop reason: HOSPADM

## 2020-07-12 RX ORDER — SODIUM CHLORIDE 0.9 % (FLUSH) 0.9 %
10 SYRINGE (ML) INJECTION AS NEEDED
Status: DISCONTINUED | OUTPATIENT
Start: 2020-07-12 | End: 2020-07-13 | Stop reason: HOSPADM

## 2020-07-12 RX ORDER — ALLOPURINOL 100 MG/1
100 TABLET ORAL DAILY
Status: DISCONTINUED | OUTPATIENT
Start: 2020-07-12 | End: 2020-07-13 | Stop reason: HOSPADM

## 2020-07-12 RX ORDER — MAGNESIUM SULFATE HEPTAHYDRATE 40 MG/ML
2 INJECTION, SOLUTION INTRAVENOUS AS NEEDED
Status: DISCONTINUED | OUTPATIENT
Start: 2020-07-12 | End: 2020-07-13 | Stop reason: HOSPADM

## 2020-07-12 RX ORDER — BISACODYL 5 MG/1
5 TABLET, DELAYED RELEASE ORAL DAILY PRN
Status: DISCONTINUED | OUTPATIENT
Start: 2020-07-12 | End: 2020-07-13 | Stop reason: HOSPADM

## 2020-07-12 RX ORDER — POTASSIUM CHLORIDE 750 MG/1
40 CAPSULE, EXTENDED RELEASE ORAL AS NEEDED
Status: DISCONTINUED | OUTPATIENT
Start: 2020-07-12 | End: 2020-07-13 | Stop reason: HOSPADM

## 2020-07-12 RX ORDER — LORAZEPAM 2 MG/ML
1 INJECTION INTRAMUSCULAR ONCE
Status: COMPLETED | OUTPATIENT
Start: 2020-07-12 | End: 2020-07-12

## 2020-07-12 RX ORDER — TRAMADOL HYDROCHLORIDE 50 MG/1
50 TABLET ORAL EVERY 6 HOURS PRN
Status: DISCONTINUED | OUTPATIENT
Start: 2020-07-12 | End: 2020-07-13 | Stop reason: HOSPADM

## 2020-07-12 RX ORDER — DEXTROSE MONOHYDRATE 25 G/50ML
25 INJECTION, SOLUTION INTRAVENOUS
Status: DISCONTINUED | OUTPATIENT
Start: 2020-07-12 | End: 2020-07-13 | Stop reason: HOSPADM

## 2020-07-12 RX ORDER — SODIUM CHLORIDE 9 MG/ML
125 INJECTION, SOLUTION INTRAVENOUS CONTINUOUS
Status: DISCONTINUED | OUTPATIENT
Start: 2020-07-12 | End: 2020-07-13 | Stop reason: HOSPADM

## 2020-07-12 RX ORDER — SODIUM CHLORIDE 0.9 % (FLUSH) 0.9 %
10 SYRINGE (ML) INJECTION EVERY 12 HOURS SCHEDULED
Status: DISCONTINUED | OUTPATIENT
Start: 2020-07-12 | End: 2020-07-13 | Stop reason: HOSPADM

## 2020-07-12 RX ORDER — GLIPIZIDE 10 MG/1
10 TABLET ORAL
Status: DISCONTINUED | OUTPATIENT
Start: 2020-07-12 | End: 2020-07-13

## 2020-07-12 RX ORDER — MAGNESIUM SULFATE HEPTAHYDRATE 40 MG/ML
4 INJECTION, SOLUTION INTRAVENOUS AS NEEDED
Status: DISCONTINUED | OUTPATIENT
Start: 2020-07-12 | End: 2020-07-13 | Stop reason: HOSPADM

## 2020-07-12 RX ORDER — NICOTINE POLACRILEX 4 MG
15 LOZENGE BUCCAL
Status: DISCONTINUED | OUTPATIENT
Start: 2020-07-12 | End: 2020-07-13 | Stop reason: HOSPADM

## 2020-07-12 RX ORDER — ATORVASTATIN CALCIUM 10 MG/1
10 TABLET, FILM COATED ORAL NIGHTLY
Status: DISCONTINUED | OUTPATIENT
Start: 2020-07-12 | End: 2020-07-13 | Stop reason: HOSPADM

## 2020-07-12 RX ORDER — ALUMINA, MAGNESIA, AND SIMETHICONE 2400; 2400; 240 MG/30ML; MG/30ML; MG/30ML
15 SUSPENSION ORAL EVERY 6 HOURS PRN
Status: DISCONTINUED | OUTPATIENT
Start: 2020-07-12 | End: 2020-07-13 | Stop reason: HOSPADM

## 2020-07-12 RX ORDER — POTASSIUM CHLORIDE 7.45 MG/ML
10 INJECTION INTRAVENOUS
Status: DISCONTINUED | OUTPATIENT
Start: 2020-07-12 | End: 2020-07-13 | Stop reason: HOSPADM

## 2020-07-12 RX ORDER — LORAZEPAM 2 MG/ML
1 INJECTION INTRAMUSCULAR
Status: DISCONTINUED | OUTPATIENT
Start: 2020-07-12 | End: 2020-07-13 | Stop reason: HOSPADM

## 2020-07-12 RX ORDER — ACETAMINOPHEN 325 MG/1
650 TABLET ORAL EVERY 4 HOURS PRN
Status: DISCONTINUED | OUTPATIENT
Start: 2020-07-12 | End: 2020-07-13 | Stop reason: HOSPADM

## 2020-07-12 RX ORDER — LORAZEPAM 1 MG/1
1 TABLET ORAL
Status: DISCONTINUED | OUTPATIENT
Start: 2020-07-12 | End: 2020-07-13 | Stop reason: HOSPADM

## 2020-07-12 RX ADMIN — SODIUM CHLORIDE 1914 ML: 9 INJECTION, SOLUTION INTRAVENOUS at 09:39

## 2020-07-12 RX ADMIN — THIAMINE HYDROCHLORIDE 100 MG: 100 INJECTION, SOLUTION INTRAMUSCULAR; INTRAVENOUS at 10:09

## 2020-07-12 RX ADMIN — LORAZEPAM 1 MG: 2 INJECTION INTRAMUSCULAR; INTRAVENOUS at 11:02

## 2020-07-12 RX ADMIN — FOLIC ACID 1 MG: 5 INJECTION, SOLUTION INTRAMUSCULAR; INTRAVENOUS; SUBCUTANEOUS at 10:08

## 2020-07-12 RX ADMIN — POTASSIUM CHLORIDE 40 MEQ: 750 CAPSULE, EXTENDED RELEASE ORAL at 15:29

## 2020-07-12 RX ADMIN — SODIUM CHLORIDE 125 ML/HR: 9 INJECTION, SOLUTION INTRAVENOUS at 22:32

## 2020-07-12 RX ADMIN — ATORVASTATIN CALCIUM 10 MG: 10 TABLET, FILM COATED ORAL at 20:27

## 2020-07-12 RX ADMIN — GLIPIZIDE 10 MG: 10 TABLET ORAL at 17:42

## 2020-07-12 RX ADMIN — TRAMADOL HYDROCHLORIDE 50 MG: 50 TABLET, FILM COATED ORAL at 15:06

## 2020-07-12 RX ADMIN — SODIUM CHLORIDE, PRESERVATIVE FREE 10 ML: 5 INJECTION INTRAVENOUS at 20:27

## 2020-07-12 RX ADMIN — SODIUM CHLORIDE 1000 ML: 9 INJECTION, SOLUTION INTRAVENOUS at 15:30

## 2020-07-12 RX ADMIN — SODIUM CHLORIDE 125 ML/HR: 9 INJECTION, SOLUTION INTRAVENOUS at 15:06

## 2020-07-12 NOTE — PLAN OF CARE
Problem: Patient Care Overview  Goal: Plan of Care Review  Outcome: Ongoing (interventions implemented as appropriate)  Flowsheets (Taken 7/12/2020 4521)  Progress: no change  Plan of Care Reviewed With: patient  Note:   Pain pill given once for generalized pain. IVF and I liter bolus given. VSS. Continue to monitor.

## 2020-07-12 NOTE — H&P
[facility] Medicine Services  HISTORY AND PHYSICAL    Date of Admission: 7/12/2020  Primary Care Physician: Aris Walden DO    Subjective     Chief Complaint:   Weakness, low blood pressure.    History of Present Illness    This 60-year-old  male was admitted with a chief complaint of weakness and lowered blood pressure.  Patient states that he has been feeling weak with low blood pressure for the past 3 days.  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  [place].  He checked his blood pressure yesterday and again today, noting that his diastolic readings were in the 50s and 60s.  He states that he has been able to drink but has been unable to eat and has not been hungry.  The patient has a history of binge drinking but does not drink every day.  His last alcohol consumption was about 3 days ago.    Work-up in the emergency department shows a chest x-ray with no acute cardiopulmonary process, unremarkable CBC except for hemoglobin of 12.5, elevated phosphorus of 5.5, normal magnesium at 2.2, normal CK, troponin, BNP, PT and PTT and creatinine kinase.  Creatinine was elevated at 2.67 with a estimated GFR low at 25.  Repeat BMP after a brief run of IV fluids shows creatinine improved to 2.08 and estimated GFR 33.  Potassium is low at 3.3 and will be supplemented.    The patient has been given a 30 mL/kg bolus of fluids in the emergency department and he will be administered another 1 L fluid bolus and started on normal saline at 125 cc/h.  Electrolyte replacement protocol will be set in place.    Review of Systems   Constitutional: Positive for activity change and appetite change.   HENT: Negative.    Eyes: Negative.    Respiratory: Negative.    Cardiovascular: Negative.    Gastrointestinal: Negative.     Endocrine: Negative.    Genitourinary: Negative.    Musculoskeletal: Negative.    Skin: Negative.    Neurological: Positive for weakness. Negative for dizziness and light-headedness.   Hematological: Negative.    Psychiatric/Behavioral: Negative.         Otherwise complete ROS reviewed and negative except as mentioned in the HPI.      Past Medical History:     Past Medical History:   Diagnosis Date   • DM (diabetes mellitus) (CMS/HCC)    • ED (erectile dysfunction)    • ETOH abuse    • GERD (gastroesophageal reflux disease)    • Gout    • H. pylori infection    • Hepatic steatosis    • History of pancreatitis 1/6/2017    secondary to etoh.    • Hyperlipidemia    • Hypertension    • Mixed hyperlipidemia    • Pancreatitis, alcoholic, acute        Past Surgical History:  Past Surgical History:   Procedure Laterality Date   • UPPER GASTROINTESTINAL ENDOSCOPY  03/2016    noting gastritis, duodenitis, esophagitis. dr whitten        Family History:   family history includes No Known Problems in his father and mother.    Social History:    reports that he has never smoked. He has never used smokeless tobacco. He reports that he drinks alcohol. He reports that he does not use drugs.    Medications:  Prior to Admission medications    Medication Sig Start Date End Date Taking? Authorizing Provider   glipiZIDE (GLUCOTROL) 10 MG tablet Take 1 tablet by mouth 2 (Two) Times a Day Before Meals. 7/3/18  Yes Janett Delgadillo APRN   allopurinol (ZYLOPRIM) 100 MG tablet Take 1 tablet by mouth Daily. 7/3/18   Janett Delgadillo APRN   cetirizine (zyrTEC) 10 MG tablet Take 10 mg by mouth Daily.    ProviderLane MD   colchicine 0.6 MG tablet Take 1 tablet by mouth Daily. 7/30/18   David Vargas MD   diclofenac (VOLTAREN) 75 MG EC tablet Take 75 mg by mouth 2 (Two) Times a Day.    ProviderLane MD   Insulin Glargine (BASAGLAR KWIKPEN SC) Inject 15 Units under the skin into the appropriate area as directed 2 (Two)  "Times a Day.    Provider, MD Lane   Insulin Glargine (LANTUS SOLOSTAR) 100 UNIT/ML injection pen Inject 10 Units under the skin Every Night. 7/3/18   Janett Delgadillo APRN   lisinopril (PRINIVIL,ZESTRIL) 20 MG tablet Take 1 tablet by mouth Daily. 7/3/18   Janett Delgadillo APRN   lovastatin (MEVACOR) 20 MG tablet Take 1 tablet by mouth Every Night. 7/3/18   Janett Delgadillo APRN   metFORMIN (GLUCOPHAGE) 1000 MG tablet Take 1 tablet by mouth 2 (Two) Times a Day With Meals. 7/3/18   Janett Delgadillo APRN   SITagliptin (JANUVIA) 100 MG tablet Take 100 mg by mouth Daily.    Provider, MD Lane       Allergies:  No Known Allergies    Objective     Vital Signs:   /74 (BP Location: Left arm, Patient Position: Lying)   Pulse 71   Temp 97.7 °F (36.5 °C) (Oral)   Resp 18   Ht 167.6 cm (66\")   Wt 85.3 kg (188 lb)   SpO2 99%   BMI 30.34 kg/m²     Physical Exam   Constitutional: He appears well-developed and well-nourished. He is cooperative. No distress.   HENT:   Head: Normocephalic and atraumatic.   Right Ear: External ear normal.   Left Ear: External ear normal.   Nose: Nose normal.   Mouth/Throat: Oropharynx is clear and moist.   Eyes: Pupils are equal, round, and reactive to light. Conjunctivae and EOM are normal. No scleral icterus.   Neck: Normal range of motion. Neck supple. No JVD present. No tracheal deviation present.   Cardiovascular: Normal rate, regular rhythm and normal heart sounds.   No murmur heard.  Pulmonary/Chest: Effort normal and breath sounds normal. No respiratory distress.   Abdominal: Soft. Bowel sounds are normal. He exhibits no mass. There is no tenderness.   Musculoskeletal: Normal range of motion. He exhibits no edema or tenderness.   Neurological: He is alert.   Skin: Skin is warm and dry. No erythema.   Cervical tenting noted.   Psychiatric: He has a normal mood and affect. Judgment normal.         Results Reviewed:  Lab Results (last 24 hours)     Procedure Component " Value Units Date/Time    Basic Metabolic Panel [[id number]] Collected:  07/12/20 1427    Specimen:  Blood Updated:  07/12/20 1445    Phosphorus [[id number]]  (Abnormal) Collected:  07/12/20 0938    Specimen:  Blood Updated:  07/12/20 1310     Phosphorus 5.5 mg/dL     Magnesium [[id number]]  (Normal) Collected:  07/12/20 0938    Specimen:  Blood Updated:  07/12/20 1307     Magnesium 2.2 mg/dL     COVID PRE-OP / PRE-PROCEDURE SCREENING ORDER (NO ISOLATION) - Swab, Nasopharynx [[id number]] Collected:  07/12/20 1136    Specimen:  Swab from Nasopharynx Updated:  07/12/20 1219    Narrative:       The following orders were created for panel order COVID PRE-OP / PRE-PROCEDURE SCREENING ORDER (NO ISOLATION) - Swab, Nasopharynx.  Procedure                               Abnormality         Status                     ---------                               -----------         ------                     COVID-19, ABBOTT IN-HOUS...[[id number]]  Normal              Final result                 Please view results for these tests on the individual orders.    COVID-19, ABBOTT IN-HOUSE,NP Swab (NO TRANSPORT MEDIA) 2 HR TAT - Swab, Nasopharynx [[id number]]  (Normal) Collected:  07/12/20 1136    Specimen:  Swab from Nasopharynx Updated:  07/12/20 1219     COVID19 Not Detected    Narrative:       Fact sheet for providers: https://www.fda.gov/media/634611/download     Fact sheet for patients: https://www.fda.gov/media/792629/download    Ketone Bodies, Serum (Not performed at [place]) [[id number]] Collected:  07/12/20 1108    Specimen:  Blood Updated:  07/12/20 1135    Narrative:       The following orders were created for panel order Ketone Bodies, Serum (Not performed at [place]).  Procedure                               Abnormality         Status                     ---------                               -----------         ------                     Acetone[[id number]]                      Normal              Final result                      Please view results for these tests on the individual orders.    Acetone [[id number]]  (Normal) Collected:  07/12/20 1108    Specimen:  Blood Updated:  07/12/20 1135     Acetone Negative    POC Occult Blood Stool [[id number]]  (Abnormal) Collected:  07/12/20 1100    Specimen:  Stool Updated:  07/12/20 1101     Fecal Occult Blood Positive     Lot Number \11/30/2020\     Expiration Date \169\     DEVELOPER LOT NUMBER \169\     DEVELOPER EXPIRATION DATE \11/30/2020\     Positive Control Positive     Negative Control Negative    Blood Gas, Arterial [[id number]]  (Abnormal) Collected:  07/12/20 1055    Specimen:  Arterial Blood Updated:  07/12/20 1100     Site Right Brachial     Dejan's Test N/A     pH, Arterial 7.392 pH units      pCO2, Arterial 38.6 mm Hg      pO2, Arterial 60.0 mm Hg      Comment: 84 Value below reference range        HCO3, Arterial 23.4 mmol/L      Base Excess, Arterial -1.3 mmol/L      Comment: 84 Value below reference range        O2 Saturation, Arterial 91.3 %      Comment: 84 Value below reference range        Temperature 37.0 C      Barometric Pressure for Blood Gas 746 mmHg      Modality Room Air     Ventilator Mode NA     Collected by [id number]     Comment: Meter: [id number]     :  [id number]        pCO2, Temperature Corrected 38.6 mm Hg      pH, Temp Corrected 7.392 pH Units      pO2, Temperature Corrected 60.0 mm Hg     POC Glucose Once [[id number]]  (Abnormal) Collected:  07/12/20 1007    Specimen:  Blood Updated:  07/12/20 1028     Glucose 164 mg/dL      Comment: : [id number] Jimmy Estrada ([place]: [id number]       Comprehensive Metabolic Panel [[id number]]  (Abnormal) Collected:  07/12/20 0938    Specimen:  Blood Updated:  07/12/20 1018     Glucose 215 mg/dL      BUN 45 mg/dL      Creatinine 2.67 mg/dL      Sodium 139 mmol/L      Potassium 3.7 mmol/L      Chloride 97 mmol/L      CO2 23.0 mmol/L      Calcium 8.7 mg/dL      Total Protein 6.4 g/dL      Albumin 4.00  g/dL      ALT (SGPT) 24 U/L      AST (SGOT) 21 U/L      Alkaline Phosphatase 82 U/L      Total Bilirubin <0.2 mg/dL      eGFR Non African Amer 25 mL/min/1.73      Globulin 2.4 gm/dL      A/G Ratio 1.7 g/dL      BUN/Creatinine Ratio 16.9     Anion Gap 19.0 mmol/L     Narrative:       GFR Normal >60  Chronic Kidney Disease <60  Kidney Failure <15      CK [[id number]]  (Normal) Collected:  07/12/20 0938    Specimen:  Blood Updated:  07/12/20 1018     Creatine Kinase 118 U/L     Troponin [[id number]]  (Normal) Collected:  07/12/20 0938    Specimen:  Blood Updated:  07/12/20 1014     Troponin T 0.010 ng/mL     Narrative:       Troponin T Reference Range:  <= 0.03 ng/mL-   Negative for AMI  >0.03 ng/mL-     Abnormal for myocardial necrosis.  Clinicians would have to utilize clinical acumen, EKG, Troponin and serial changes to determine if it is an Acute Myocardial Infarction or myocardial injury due to an underlying chronic condition.       Results may be falsely decreased if patient taking Biotin.      BNP [[id number]]  (Normal) Collected:  07/12/20 0938    Specimen:  Blood Updated:  07/12/20 1014     proBNP 136.0 pg/mL     Narrative:       Among patients with dyspnea, NT-proBNP is highly sensitive for the detection of acute congestive heart failure. In addition NT-proBNP of <300 pg/ml effectively rules out acute congestive heart failure with 99% negative predictive value.    Results may be falsely decreased if patient taking Biotin.      Lipase [[id number]]  (Abnormal) Collected:  07/12/20 0938    Specimen:  Blood Updated:  07/12/20 1013     Lipase 116 U/L     Urinalysis, Microscopic Only - Urine, Clean Catch [[id number]]  (Abnormal) Collected:  07/12/20 0938    Specimen:  Urine, Clean Catch Updated:  07/12/20 1013     RBC, UA None Seen /HPF      WBC, UA 3-5 /HPF      Bacteria, UA Trace /HPF      Squamous Epithelial Cells, UA None Seen /HPF      Methodology Manual Light Microscopy    Ethanol [[id number]] Collected:  07/12/20  0938    Specimen:  Blood Updated:  07/12/20 1013     Ethanol % <0.010 %     Narrative:       Not for legal purposes. Chain of Custody not followed.     Protime-INR [[id number]]  (Normal) Collected:  07/12/20 0938    Specimen:  Blood Updated:  07/12/20 1007     Protime 13.1 Seconds      INR 1.03    aPTT [[id number]]  (Normal) Collected:  07/12/20 0938    Specimen:  Blood Updated:  07/12/20 1007     PTT 25.4 seconds     Urinalysis With Culture If Indicated - Urine, Clean Catch [[id number]]  (Abnormal) Collected:  07/12/20 0938    Specimen:  Urine, Clean Catch Updated:  07/12/20 1003     Color, UA Yellow     Appearance, UA Clear     pH, UA <=5.0     Specific Gravity, UA 1.023     Glucose, UA Negative     Ketones, UA Trace     Bilirubin, UA Negative     Blood, UA Negative     Protein, UA 30 mg/dL (1+)     Leuk Esterase, UA Negative     Nitrite, UA Negative     Urobilinogen, UA 0.2 E.U./dL    CBC & Differential [[id number]] Collected:  07/12/20 0938    Specimen:  Blood Updated:  07/12/20 0958    Narrative:       The following orders were created for panel order CBC & Differential.  Procedure                               Abnormality         Status                     ---------                               -----------         ------                     CBC Auto Differential[[id number]]        Abnormal            Final result                 Please view results for these tests on the individual orders.    CBC Auto Differential [[id number]]  (Abnormal) Collected:  07/12/20 0938    Specimen:  Blood Updated:  07/12/20 0958     WBC 7.79 10*3/mm3      RBC 3.97 10*6/mm3      Hemoglobin 12.5 g/dL      Hematocrit 36.1 %      MCV 90.9 fL      MCH 31.5 pg      MCHC 34.6 g/dL      RDW 13.0 %      RDW-SD 42.4 fl      MPV 10.2 fL      Platelets 218 10*3/mm3      Neutrophil % 61.0 %      Lymphocyte % 19.8 %      Monocyte % 9.4 %      Eosinophil % 8.0 %      Basophil % 1.3 %      Immature Grans % 0.5 %      Neutrophils, Absolute 4.76 10*3/mm3       Lymphocytes, Absolute 1.54 10*3/mm3      Monocytes, Absolute 0.73 10*3/mm3      Eosinophils, Absolute 0.62 10*3/mm3      Basophils, Absolute 0.10 10*3/mm3      Immature Grans, Absolute 0.04 10*3/mm3      nRBC 0.0 /100 WBC           Xr Chest 1 View    Result Date: 7/12/2020  Narrative: Frontal upright radiograph of the chest 7/12/2020 9:06 AM CDT  COMPARISON: 11/03/2019  HISTORY: Weakness.  FINDINGS: The lungs are clear. The cardiomediastinal silhouette and pulmonary vascularity are within normal limits.  The osseous structures and surrounding soft tissues demonstrate no acute abnormality.      Impression: 1. No radiographic evidence of acute cardiopulmonary process.   This report was finalized on 07/12/2020 09:18 by Dr. Jeremiah Bernardo MD.     I have personally reviewed and interpreted the radiology studies and ECG obtained at time of admission.     Assessment / Plan      Assessment & Plan  Active Hospital Problems    Diagnosis   • **GANESH (acute kidney injury) (CMS/HCC)   • BRBPR (bright red blood per rectum)   • Dehydration   • GERD (gastroesophageal reflux disease)   • ETOH abuse   • Essential hypertension   • Type 2 diabetes mellitus without complication, without long-term current use of insulin (CMS/HCC)       PLAN:   Additional 1 L normal saline bolus x1  NS at 125 cc/h  Electrolyte replacement protocol  BMP, CBC, magnesium in a.m. and daily thereafter  CIWA protocol for PRN Ativan  Sliding scale insulin    Code Status:   Full code  Surrogate decision-maker is the patient's wife     I discussed the patient's findings and my recommendations with: The patient    Estimated length of stay: 2 to 3 days    Parveen Zuleta DO   07/12/20   15:00

## 2020-07-12 NOTE — ED PROVIDER NOTES
Subjective   History of Present Illness    Patient is a pleasant 60-year-old  gentleman with chief complaint of feeling weak with low blood pressure.  The patient is a forthcoming excellent historian.  He describes that he has a history of alcoholism and often binge drinks on tequila and beer.  The patient describes that he has been drinking frequently due to the holiday.  He believes the last time he had some alcohol is about 2 to 3 days ago.  The patient describes that he does go through withdrawal and occasions and he started to develop some tremors and shaking.  [place]  He reports that he has been on the heat frequently in the past several days as well.  He was pushing a lot of water but sweated a significant amount.  When he went home, he describes that he laid out in the air conditioning but noticed blood pressure was about 90/50.  He typically runs about 140-150 systolic.  The patient also is a diabetic and is on oral medication with insulin.  His glucose yesterday was 279.  When he rechecked it this morning, it is measured at 190.  He had taken his oral medication but not his insulin this morning.  He also had measured his blood pressure is repetitively 90/60.  With feeling weak and being hypotensive, he came to the ER to be further evaluated.    Patient also has a history of alcoholic pancreatitis.  His last episode was several years ago.  He denies any known history of ulcers.  He describes that with his appetite decreasing, the only thing can usually tolerate during withdrawals are salsa.  The patient has noted 3 episodes of bright red blood in his stool yesterday but has not checked anytime before.  He is not sure that actually blood or salsa.  He denies any associate abdominal pain.    Patient denies any change in urinary output.  He describes as putting on liquids but still feels dehydrated.  He denies any fever, chest pain, shortness of breath.    Review of  Systems   Constitutional: Positive for activity change, diaphoresis and fatigue.   Eyes: Negative.    Respiratory: Negative for chest tightness and shortness of breath.    Cardiovascular: Negative for chest pain.   Gastrointestinal: Positive for blood in stool. Negative for abdominal pain, constipation, nausea and vomiting.   Genitourinary: Negative.  Negative for difficulty urinating.   Musculoskeletal: Negative.    Skin: Negative.    Neurological: Positive for weakness and headaches.   Psychiatric/Behavioral: Negative.    All other systems reviewed and are negative.      Past Medical History:   Diagnosis Date   • DM (diabetes mellitus) (CMS/HCC)    • ED (erectile dysfunction)    • ETOH abuse    • GERD (gastroesophageal reflux disease)    • Gout    • H. pylori infection    • Hepatic steatosis    • Hyperlipidemia    • Hypertension    • Pancreatitis, alcoholic, acute        No Known Allergies    Past Surgical History:   Procedure Laterality Date   • UPPER GASTROINTESTINAL ENDOSCOPY  03/2016    noting gastritis, duodenitis, esophagitis. dr whitten        No family history on file.    Social History     Socioeconomic History   • Marital status:      Spouse name: Not on file   • Number of children: Not on file   • Years of education: Not on file   • Highest education level: Not on file   Tobacco Use   • Smoking status: Never Smoker   • Smokeless tobacco: Never Used   Substance and Sexual Activity   • Alcohol use: Yes     Comment: occ   • Drug use: No   • Sexual activity: Defer       Prior to Admission medications    Medication Sig Start Date End Date Taking? Authorizing Provider   allopurinol (ZYLOPRIM) 100 MG tablet Take 1 tablet by mouth Daily. 7/3/18   Janett Delgadillo APRN   cetirizine (zyrTEC) 10 MG tablet Take 10 mg by mouth Daily.    Provider, MD Lane   colchicine 0.6 MG tablet Take 1 tablet by mouth Daily. 7/30/18   David Vargas MD   diclofenac (VOLTAREN) 75 MG EC tablet Take 75 mg by mouth  "2 (Two) Times a Day.    ProviderLane MD   glipiZIDE (GLUCOTROL) 10 MG tablet Take 1 tablet by mouth 2 (Two) Times a Day Before Meals. 7/3/18   Janett Delgadillo APRN   Insulin Glargine (BASAGLAR KWIKPEN SC) Inject 15 Units under the skin into the appropriate area as directed 2 (Two) Times a Day.    Lane Nguyen MD   Insulin Glargine (LANTUS SOLOSTAR) 100 UNIT/ML injection pen Inject 10 Units under the skin Every Night. 7/3/18   Janett Delgadillo APRN   lisinopril (PRINIVIL,ZESTRIL) 20 MG tablet Take 1 tablet by mouth Daily. 7/3/18   Janett Delgadillo APRN   lovastatin (MEVACOR) 20 MG tablet Take 1 tablet by mouth Every Night. 7/3/18   Janett Delgadillo APRN   metFORMIN (GLUCOPHAGE) 1000 MG tablet Take 1 tablet by mouth 2 (Two) Times a Day With Meals. 7/3/18   Janett Delgadillo APRN   SITagliptin (JANUVIA) 100 MG tablet Take 100 mg by mouth Daily.    Lane Nguyen MD       Medications   sodium chloride 0.9% - IBW for BMI > 30 bolus 1,914 mL (1,914 mL Intravenous New Bag 7/12/20 0939)   thiamine (B-1) injection 100 mg (100 mg Intramuscular Given 7/12/20 1009)   folic acid 1 mg in sodium chloride 0.9 % 50 mL IVPB (0 mg Intravenous Stopped 7/12/20 1050)   LORazepam (ATIVAN) injection 1 mg (1 mg Intravenous Given 7/12/20 1102)       /78   Pulse 75   Temp 98 °F (36.7 °C) (Oral)   Resp 18   Ht 167.6 cm (66\")   Wt 85.3 kg (188 lb)   SpO2 99%   BMI 30.34 kg/m²       Objective   Physical Exam   Constitutional: He is oriented to person, place, and time. He appears well-developed and well-nourished.   HENT:   Head: Normocephalic and atraumatic.   Right Ear: External ear normal.   Left Ear: External ear normal.   Nose: Nose normal.   Mouth/Throat: Oropharynx is clear and moist.   Eyes: Pupils are equal, round, and reactive to light. Conjunctivae and EOM are normal.   Neck: Normal range of motion. Neck supple. No tracheal deviation present.   Cardiovascular: Normal rate, regular rhythm, " normal heart sounds and intact distal pulses. Exam reveals no gallop and no friction rub.   No murmur heard.  Pulmonary/Chest: Effort normal and breath sounds normal. No respiratory distress. He has no wheezes. He has no rales. He exhibits no tenderness.   Abdominal: Soft. Bowel sounds are normal. He exhibits no distension and no mass. There is no tenderness. There is no rebound and no guarding.   Musculoskeletal: Normal range of motion. He exhibits no edema, tenderness or deformity.   Neurological: He is alert and oriented to person, place, and time. He has normal reflexes. He exhibits normal muscle tone. Coordination normal.   Skin: Skin is warm and dry. Capillary refill takes less than 2 seconds. No rash noted. No erythema. No pallor.   Psychiatric: He has a normal mood and affect. His behavior is normal. Judgment and thought content normal.   Vitals reviewed.      Procedures         Lab Results (last 24 hours)     Procedure Component Value Units Date/Time    CBC & Differential [[id number]] Collected:  07/12/20 0938    Specimen:  Blood Updated:  07/12/20 0958    Narrative:       The following orders were created for panel order CBC & Differential.  Procedure                               Abnormality         Status                     ---------                               -----------         ------                     CBC Auto Differential[[id number]]        Abnormal            Final result                 Please view results for these tests on the individual orders.    Comprehensive Metabolic Panel [[id number]]  (Abnormal) Collected:  07/12/20 0938    Specimen:  Blood Updated:  07/12/20 1018     Glucose 215 mg/dL      BUN 45 mg/dL      Creatinine 2.67 mg/dL      Sodium 139 mmol/L      Potassium 3.7 mmol/L      Chloride 97 mmol/L      CO2 23.0 mmol/L      Calcium 8.7 mg/dL      Total Protein 6.4 g/dL      Albumin 4.00 g/dL      ALT (SGPT) 24 U/L      AST (SGOT) 21 U/L      Alkaline Phosphatase 82 U/L      Total  Bilirubin <0.2 mg/dL      eGFR Non African Amer 25 mL/min/1.73      Globulin 2.4 gm/dL      A/G Ratio 1.7 g/dL      BUN/Creatinine Ratio 16.9     Anion Gap 19.0 mmol/L     Narrative:       GFR Normal >60  Chronic Kidney Disease <60  Kidney Failure <15      Urinalysis With Culture If Indicated - Urine, Clean Catch [[id number]]  (Abnormal) Collected:  07/12/20 0938    Specimen:  Urine, Clean Catch Updated:  07/12/20 1003     Color, UA Yellow     Appearance, UA Clear     pH, UA <=5.0     Specific Gravity, UA 1.023     Glucose, UA Negative     Ketones, UA Trace     Bilirubin, UA Negative     Blood, UA Negative     Protein, UA 30 mg/dL (1+)     Leuk Esterase, UA Negative     Nitrite, UA Negative     Urobilinogen, UA 0.2 E.U./dL    Lipase [[id number]]  (Abnormal) Collected:  07/12/20 0938    Specimen:  Blood Updated:  07/12/20 1013     Lipase 116 U/L     Protime-INR [[id number]]  (Normal) Collected:  07/12/20 0938    Specimen:  Blood Updated:  07/12/20 1007     Protime 13.1 Seconds      INR 1.03    aPTT [[id number]]  (Normal) Collected:  07/12/20 0938    Specimen:  Blood Updated:  07/12/20 1007     PTT 25.4 seconds     BNP [[id number]]  (Normal) Collected:  07/12/20 0938    Specimen:  Blood Updated:  07/12/20 1014     proBNP 136.0 pg/mL     Narrative:       Among patients with dyspnea, NT-proBNP is highly sensitive for the detection of acute congestive heart failure. In addition NT-proBNP of <300 pg/ml effectively rules out acute congestive heart failure with 99% negative predictive value.    Results may be falsely decreased if patient taking Biotin.      Troponin [[id number]]  (Normal) Collected:  07/12/20 0938    Specimen:  Blood Updated:  07/12/20 1014     Troponin T 0.010 ng/mL     Narrative:       Troponin T Reference Range:  <= 0.03 ng/mL-   Negative for AMI  >0.03 ng/mL-     Abnormal for myocardial necrosis.  Clinicians would have to utilize clinical acumen, EKG, Troponin and serial changes to determine if it is an Acute  Myocardial Infarction or myocardial injury due to an underlying chronic condition.       Results may be falsely decreased if patient taking Biotin.      Ethanol [[id number]] Collected:  07/12/20 0938    Specimen:  Blood Updated:  07/12/20 1013     Ethanol % <0.010 %     Narrative:       Not for legal purposes. Chain of Custody not followed.     CK [[id number]]  (Normal) Collected:  07/12/20 0938    Specimen:  Blood Updated:  07/12/20 1018     Creatine Kinase 118 U/L     CBC Auto Differential [[id number]]  (Abnormal) Collected:  07/12/20 0938    Specimen:  Blood Updated:  07/12/20 0958     WBC 7.79 10*3/mm3      RBC 3.97 10*6/mm3      Hemoglobin 12.5 g/dL      Hematocrit 36.1 %      MCV 90.9 fL      MCH 31.5 pg      MCHC 34.6 g/dL      RDW 13.0 %      RDW-SD 42.4 fl      MPV 10.2 fL      Platelets 218 10*3/mm3      Neutrophil % 61.0 %      Lymphocyte % 19.8 %      Monocyte % 9.4 %      Eosinophil % 8.0 %      Basophil % 1.3 %      Immature Grans % 0.5 %      Neutrophils, Absolute 4.76 10*3/mm3      Lymphocytes, Absolute 1.54 10*3/mm3      Monocytes, Absolute 0.73 10*3/mm3      Eosinophils, Absolute 0.62 10*3/mm3      Basophils, Absolute 0.10 10*3/mm3      Immature Grans, Absolute 0.04 10*3/mm3      nRBC 0.0 /100 WBC     Urinalysis, Microscopic Only - Urine, Clean Catch [[id number]]  (Abnormal) Collected:  07/12/20 0938    Specimen:  Urine, Clean Catch Updated:  07/12/20 1013     RBC, UA None Seen /HPF      WBC, UA 3-5 /HPF      Bacteria, UA Trace /HPF      Squamous Epithelial Cells, UA None Seen /HPF      Methodology Manual Light Microscopy    POC Glucose Once [[id number]]  (Abnormal) Collected:  07/12/20 1007    Specimen:  Blood Updated:  07/12/20 1028     Glucose 164 mg/dL      Comment: : [id number] Jaylannayelistephanie MalloyJorge A) JeannaMeter ID: [id number]       Blood Gas, Arterial [[id number]]  (Abnormal) Collected:  07/12/20 1055    Specimen:  Arterial Blood Updated:  07/12/20 1100     Site Right Brachial     Dejan's Test N/A      pH, Arterial 7.392 pH units      pCO2, Arterial 38.6 mm Hg      pO2, Arterial 60.0 mm Hg      Comment: 84 Value below reference range        HCO3, Arterial 23.4 mmol/L      Base Excess, Arterial -1.3 mmol/L      Comment: 84 Value below reference range        O2 Saturation, Arterial 91.3 %      Comment: 84 Value below reference range        Temperature 37.0 C      Barometric Pressure for Blood Gas 746 mmHg      Modality Room Air     Ventilator Mode NA     Collected by [id number]     Comment: Meter: [id number]     :  [id number]        pCO2, Temperature Corrected 38.6 mm Hg      pH, Temp Corrected 7.392 pH Units      pO2, Temperature Corrected 60.0 mm Hg     POC Occult Blood Stool [[id number]]  (Abnormal) Collected:  07/12/20 1100    Specimen:  Stool Updated:  07/12/20 1101     Fecal Occult Blood Positive     Lot Number \11/30/2020\     Expiration Date \169\     DEVELOPER LOT NUMBER \169\     DEVELOPER EXPIRATION DATE \11/30/2020\     Positive Control Positive     Negative Control Negative    Ketone Bodies, Serum (Not performed at [place]) [[id number]] Collected:  07/12/20 1108    Specimen:  Blood Updated:  07/12/20 1135    Narrative:       The following orders were created for panel order Ketone Bodies, Serum (Not performed at [place]).  Procedure                               Abnormality         Status                     ---------                               -----------         ------                     Acetone[[id number]]                      Normal              Final result                 Please view results for these tests on the individual orders.    Acetone [[id number]]  (Normal) Collected:  07/12/20 1108    Specimen:  Blood Updated:  07/12/20 1135     Acetone Negative    COVID PRE-OP / PRE-PROCEDURE SCREENING ORDER (NO ISOLATION) - Swab, Nasopharynx [[id number]] Collected:  07/12/20 1136    Specimen:  Swab from Nasopharynx Updated:  07/12/20 1145    Narrative:       The following orders were  created for panel order COVID PRE-OP / PRE-PROCEDURE SCREENING ORDER (NO ISOLATION) - Swab, Nasopharynx.  Procedure                               Abnormality         Status                     ---------                               -----------         ------                     COVID-19, [facility] IN-HOUS...[[id number]]                      In process                   Please view results for these tests on the individual orders.    COVID-19, [facility] IN-HOUSE,NP Swab (NO TRANSPORT MEDIA) 2 HR TAT - Swab, Nasopharynx [[id number]] Collected:  07/12/20 1136    Specimen:  Swab from Nasopharynx Updated:  07/12/20 1145          Xr Chest 1 View    Result Date: 7/12/2020  Narrative: Frontal upright radiograph of the chest 7/12/2020 9:06 AM CDT  COMPARISON: 11/03/2019  HISTORY: Weakness.  FINDINGS: The lungs are clear. The cardiomediastinal silhouette and pulmonary vascularity are within normal limits.  The osseous structures and surrounding soft tissues demonstrate no acute abnormality.      Impression: 1. No radiographic evidence of acute cardiopulmonary process.   This report was finalized on 07/12/2020 09:18 by Dr. Jeremiah Bernardo MD.      ED Course  ED Course as of Jul 12 1152   Sun Jul 12, 2020   1040 Patient's previous creatinine levels checked.  His creatinine was 1.19 one year ago.  Today, it is measured at 2.67.    [TK]   1147 Patient has been reassessed.  He has been educated of his laboratory data revealing acute kidney injury and dehydration.  He reports feeling better after receiving fluids.  There is no evidence of DKA.  He is heme positive below with bright red blood in his stool specimen.  He denies any abdominal pain.    [TK]   1148 His blood pressure has improved.  It was 98/79 while here initially and now measuring 140 systolic.  He has received Ativan for empiric treatment for withdrawal.    [TK]   1150 I have spoken with Dr. Zuleta, hospitalist on call. He will admit the patient under his care.     [TK]       ED Course User Index  [TK] Dena Garsia PA          [facility]    Final diagnoses:   Hypovolemia   History of alcoholism (CMS/HCC)   Dehydration   Acute kidney injury (CMS/HCC)          Dena Garsia PA  07/12/20 [id number]

## 2020-07-13 VITALS
RESPIRATION RATE: 16 BRPM | WEIGHT: 188 LBS | DIASTOLIC BLOOD PRESSURE: 69 MMHG | OXYGEN SATURATION: 94 % | BODY MASS INDEX: 30.22 KG/M2 | HEIGHT: 66 IN | SYSTOLIC BLOOD PRESSURE: 153 MMHG | HEART RATE: 56 BPM | TEMPERATURE: 97.8 F

## 2020-07-13 PROBLEM — T67.5XXA HEAT EXHAUSTION: Status: ACTIVE | Noted: 2020-07-13

## 2020-07-13 PROBLEM — K64.9 BLEEDING HEMORRHOIDS: Status: ACTIVE | Noted: 2020-07-12

## 2020-07-13 LAB
ANION GAP SERPL CALCULATED.3IONS-SCNC: 11 MMOL/L (ref 5–15)
BUN SERPL-MCNC: 17 MG/DL (ref 6–20)
BUN/CREAT SERPL: 25 (ref 7–25)
CALCIUM SPEC-SCNC: 8.2 MG/DL (ref 8.6–10.5)
CHLORIDE SERPL-SCNC: 106 MMOL/L (ref 98–107)
CO2 SERPL-SCNC: 21 MMOL/L (ref 22–29)
CREAT SERPL-MCNC: 0.68 MG/DL (ref 0.76–1.27)
GFR SERPL CREATININE-BSD FRML MDRD: 120 ML/MIN/1.73
GLUCOSE BLDC GLUCOMTR-MCNC: 150 MG/DL (ref 70–130)
GLUCOSE BLDC GLUCOMTR-MCNC: 219 MG/DL (ref 70–130)
GLUCOSE SERPL-MCNC: 222 MG/DL (ref 65–99)
MAGNESIUM SERPL-MCNC: 1.9 MG/DL (ref 1.6–2.6)
PHOSPHATE SERPL-MCNC: 2.1 MG/DL (ref 2.5–4.5)
POTASSIUM SERPL-SCNC: 4.3 MMOL/L (ref 3.5–5.2)
SODIUM SERPL-SCNC: 138 MMOL/L (ref 136–145)

## 2020-07-13 PROCEDURE — 63710000001 INSULIN LISPRO (HUMAN) PER 5 UNITS: Performed by: FAMILY MEDICINE

## 2020-07-13 PROCEDURE — 25010000003 MAGNESIUM SULFATE 4 GM/100ML SOLUTION: Performed by: FAMILY MEDICINE

## 2020-07-13 PROCEDURE — 82962 GLUCOSE BLOOD TEST: CPT

## 2020-07-13 PROCEDURE — 84100 ASSAY OF PHOSPHORUS: CPT | Performed by: FAMILY MEDICINE

## 2020-07-13 PROCEDURE — 83735 ASSAY OF MAGNESIUM: CPT | Performed by: FAMILY MEDICINE

## 2020-07-13 PROCEDURE — 80048 BASIC METABOLIC PNL TOTAL CA: CPT | Performed by: FAMILY MEDICINE

## 2020-07-13 RX ADMIN — ALLOPURINOL 100 MG: 100 TABLET ORAL at 08:03

## 2020-07-13 RX ADMIN — MAGNESIUM SULFATE IN WATER 4 G: 40 INJECTION, SOLUTION INTRAVENOUS at 08:03

## 2020-07-13 RX ADMIN — COLCHICINE 0.6 MG: 0.6 TABLET, FILM COATED ORAL at 08:03

## 2020-07-13 RX ADMIN — SODIUM CHLORIDE 125 ML/HR: 9 INJECTION, SOLUTION INTRAVENOUS at 06:10

## 2020-07-13 RX ADMIN — INSULIN LISPRO 5 UNITS: 100 INJECTION, SOLUTION INTRAVENOUS; SUBCUTANEOUS at 11:31

## 2020-07-13 RX ADMIN — INSULIN LISPRO 3 UNITS: 100 INJECTION, SOLUTION INTRAVENOUS; SUBCUTANEOUS at 08:02

## 2020-07-13 RX ADMIN — LINAGLIPTIN 5 MG: 5 TABLET, FILM COATED ORAL at 08:03

## 2020-07-13 RX ADMIN — CETIRIZINE HYDROCHLORIDE 10 MG: 10 TABLET, FILM COATED ORAL at 08:03

## 2020-07-13 NOTE — DISCHARGE SUMMARY
[facility] Medicine Services  DISCHARGE SUMMARY       Date of Admission: 7/12/2020  Date of Discharge:  7/13/2020  Primary Care Physician: Aris Walden DO    Presenting Problem/History of Present Illness:  Weakness.    Final Discharge Diagnoses:  Active Hospital Problems    Diagnosis   • **Heat exhaustion   • GANESH (acute kidney injury) (CMS/[facility])   • Dehydration   • Alcoholism (CMS/[facility])   • Bleeding hemorrhoids   • GERD (gastroesophageal reflux disease)   • Essential hypertension   • Type 2 diabetes mellitus without complication, with long-term current use of insulin (CMS/[facility])     Consults: None.     Procedures Performed: None.     Pertinent Test Results:   Imaging Results (Last 7 Days)     Procedure Component Value Units Date/Time    XR Chest 1 View [[id number]] Collected:  07/12/20 0918     Updated:  07/12/20 0921    Narrative:       Frontal upright radiograph of the chest 7/12/2020 9:06 AM CDT     COMPARISON: 11/03/2019     HISTORY: Weakness.     FINDINGS:   The lungs are clear. The cardiomediastinal silhouette and pulmonary  vascularity are within normal limits.      The osseous structures and surrounding soft tissues demonstrate no acute  abnormality.       Impression:       1. No radiographic evidence of acute cardiopulmonary process.        This report was finalized on 07/12/2020 09:18 by Dr. Jeremiah Bernardo MD.        Lab Results (last 7 days)     Procedure Component Value Units Date/Time    Phosphorus [[id number]]  (Abnormal) Collected:  07/13/20 0825    Specimen:  Blood Updated:  07/13/20 0918     Phosphorus 2.1 mg/dL     Basic Metabolic Panel [[id number]]  (Abnormal) Collected:  07/13/20 0825    Specimen:  Blood Updated:  07/13/20 0918     Glucose 222 mg/dL      BUN 17 mg/dL      Creatinine 0.68 mg/dL      Sodium 138 mmol/L      Potassium 4.3 mmol/L      Chloride 106 mmol/L      CO2 21.0 mmol/L      Calcium 8.2 mg/dL      eGFR Non African Amer 120 mL/min/1.73       BUN/Creatinine Ratio 25.0     Anion Gap 11.0 mmol/L     Narrative:       GFR Normal >60  Chronic Kidney Disease <60  Kidney Failure <15      POC Glucose Once [[id number]]  (Abnormal) Collected:  07/13/20 0750    Specimen:  Blood Updated:  07/13/20 0801     Glucose 150 mg/dL      Comment: : [id number] Marnie JessicaMeter ID: [id number]       Magnesium [[id number]]  (Normal) Collected:  07/13/20 0532    Specimen:  Blood Updated:  07/13/20 0646     Magnesium 1.9 mg/dL     POC Glucose Once [[id number]]  (Abnormal) Collected:  07/12/20 2148    Specimen:  Blood Updated:  07/12/20 2158     Glucose 153 mg/dL      Comment: : [id number] Franco JenniferMeter ID: [id number]       Potassium [[id number]]  (Normal) Collected:  07/12/20 2017    Specimen:  Blood Updated:  07/12/20 2035     Potassium 3.7 mmol/L     POC Glucose Once [[id number]]  (Normal) Collected:  07/12/20 1709    Specimen:  Blood Updated:  07/12/20 1740     Glucose 104 mg/dL      Comment: : [id number] Rahman (Nicole) JessicaMeter ID: [id number]       Basic Metabolic Panel [[id number]]  (Abnormal) Collected:  07/12/20 1427    Specimen:  Blood Updated:  07/12/20 1501     Glucose 128 mg/dL      BUN 38 mg/dL      Creatinine 2.08 mg/dL      Sodium 142 mmol/L      Potassium 3.3 mmol/L      Chloride 103 mmol/L      CO2 24.0 mmol/L      Calcium 8.0 mg/dL      eGFR Non African Amer 33 mL/min/1.73      BUN/Creatinine Ratio 18.3     Anion Gap 15.0 mmol/L     Narrative:       GFR Normal >60  Chronic Kidney Disease <60  Kidney Failure <15      Phosphorus [[id number]]  (Abnormal) Collected:  07/12/20 0938    Specimen:  Blood Updated:  07/12/20 1310     Phosphorus 5.5 mg/dL     Magnesium [[id number]]  (Normal) Collected:  07/12/20 0938    Specimen:  Blood Updated:  07/12/20 1307     Magnesium 2.2 mg/dL     COVID PRE-OP / PRE-PROCEDURE SCREENING ORDER (NO ISOLATION) - Swab, Nasopharynx [[id number]] Collected:  07/12/20 1136    Specimen:  Swab from Nasopharynx Updated:  07/12/20  1219    Narrative:       The following orders were created for panel order COVID PRE-OP / PRE-PROCEDURE SCREENING ORDER (NO ISOLATION) - Swab, Nasopharynx.  Procedure                               Abnormality         Status                     ---------                               -----------         ------                     COVID-19, ABBOTT IN-HOUS...[[id number]]  Normal              Final result                 Please view results for these tests on the individual orders.    COVID-19, ABBOTT IN-HOUSE,NP Swab (NO TRANSPORT MEDIA) 2 HR TAT - Swab, Nasopharynx [[id number]]  (Normal) Collected:  07/12/20 1136    Specimen:  Swab from Nasopharynx Updated:  07/12/20 1219     COVID19 Not Detected    Narrative:       Fact sheet for providers: https://www.fda.gov/media/530735/download     Fact sheet for patients: https://www.fda.gov/media/526518/download    Ketone Bodies, Serum (Not performed at [place]) [[id number]] Collected:  07/12/20 1108    Specimen:  Blood Updated:  07/12/20 1135    Narrative:       The following orders were created for panel order Ketone Bodies, Serum (Not performed at [place]).  Procedure                               Abnormality         Status                     ---------                               -----------         ------                     Acetone[[id number]]                      Normal              Final result                 Please view results for these tests on the individual orders.    Acetone [[id number]]  (Normal) Collected:  07/12/20 1108    Specimen:  Blood Updated:  07/12/20 1135     Acetone Negative    POC Occult Blood Stool [[id number]]  (Abnormal) Collected:  07/12/20 1100    Specimen:  Stool Updated:  07/12/20 1101     Fecal Occult Blood Positive     Lot Number \11/30/2020\     Expiration Date \169\     DEVELOPER LOT NUMBER \169\     DEVELOPER EXPIRATION DATE \11/30/2020\     Positive Control Positive     Negative Control Negative    Blood Gas, Arterial [[id number]]   (Abnormal) Collected:  07/12/20 1055    Specimen:  Arterial Blood Updated:  07/12/20 1100     Site Right Brachial     Dejan's Test N/A     pH, Arterial 7.392 pH units      pCO2, Arterial 38.6 mm Hg      pO2, Arterial 60.0 mm Hg      Comment: 84 Value below reference range        HCO3, Arterial 23.4 mmol/L      Base Excess, Arterial -1.3 mmol/L      Comment: 84 Value below reference range        O2 Saturation, Arterial 91.3 %      Comment: 84 Value below reference range        Temperature 37.0 C      Barometric Pressure for Blood Gas 746 mmHg      Modality Room Air     Ventilator Mode NA     Collected by [id number]     Comment: Meter: [id number]     :  [id number]        pCO2, Temperature Corrected 38.6 mm Hg      pH, Temp Corrected 7.392 pH Units      pO2, Temperature Corrected 60.0 mm Hg     POC Glucose Once [[id number]]  (Abnormal) Collected:  07/12/20 1007    Specimen:  Blood Updated:  07/12/20 1028     Glucose 164 mg/dL      Comment: : [id number] Marquez (Bruzan) JeannaMeter ID: [id number]       Comprehensive Metabolic Panel [[id number]]  (Abnormal) Collected:  07/12/20 0938    Specimen:  Blood Updated:  07/12/20 1018     Glucose 215 mg/dL      BUN 45 mg/dL      Creatinine 2.67 mg/dL      Sodium 139 mmol/L      Potassium 3.7 mmol/L      Chloride 97 mmol/L      CO2 23.0 mmol/L      Calcium 8.7 mg/dL      Total Protein 6.4 g/dL      Albumin 4.00 g/dL      ALT (SGPT) 24 U/L      AST (SGOT) 21 U/L      Alkaline Phosphatase 82 U/L      Total Bilirubin <0.2 mg/dL      eGFR Non African Amer 25 mL/min/1.73      Globulin 2.4 gm/dL      A/G Ratio 1.7 g/dL      BUN/Creatinine Ratio 16.9     Anion Gap 19.0 mmol/L     Narrative:       GFR Normal >60  Chronic Kidney Disease <60  Kidney Failure <15      CK [[id number]]  (Normal) Collected:  07/12/20 0938    Specimen:  Blood Updated:  07/12/20 1018     Creatine Kinase 118 U/L     Troponin [[id number]]  (Normal) Collected:  07/12/20 0938    Specimen:  Blood Updated:   07/12/20 1014     Troponin T 0.010 ng/mL     Narrative:       Troponin T Reference Range:  <= 0.03 ng/mL-   Negative for AMI  >0.03 ng/mL-     Abnormal for myocardial necrosis.  Clinicians would have to utilize clinical acumen, EKG, Troponin and serial changes to determine if it is an Acute Myocardial Infarction or myocardial injury due to an underlying chronic condition.       Results may be falsely decreased if patient taking Biotin.      BNP [[id number]]  (Normal) Collected:  07/12/20 0938    Specimen:  Blood Updated:  07/12/20 1014     proBNP 136.0 pg/mL     Narrative:       Among patients with dyspnea, NT-proBNP is highly sensitive for the detection of acute congestive heart failure. In addition NT-proBNP of <300 pg/ml effectively rules out acute congestive heart failure with 99% negative predictive value.    Results may be falsely decreased if patient taking Biotin.      Lipase [[id number]]  (Abnormal) Collected:  07/12/20 0938    Specimen:  Blood Updated:  07/12/20 1013     Lipase 116 U/L     Urinalysis, Microscopic Only - Urine, Clean Catch [[id number]]  (Abnormal) Collected:  07/12/20 0938    Specimen:  Urine, Clean Catch Updated:  07/12/20 1013     RBC, UA None Seen /HPF      WBC, UA 3-5 /HPF      Bacteria, UA Trace /HPF      Squamous Epithelial Cells, UA None Seen /HPF      Methodology Manual Light Microscopy    Ethanol [[id number]] Collected:  07/12/20 0938    Specimen:  Blood Updated:  07/12/20 1013     Ethanol % <0.010 %     Narrative:       Not for legal purposes. Chain of Custody not followed.     Protime-INR [[id number]]  (Normal) Collected:  07/12/20 0938    Specimen:  Blood Updated:  07/12/20 1007     Protime 13.1 Seconds      INR 1.03    aPTT [[id number]]  (Normal) Collected:  07/12/20 0938    Specimen:  Blood Updated:  07/12/20 1007     PTT 25.4 seconds     Urinalysis With Culture If Indicated - Urine, Clean Catch [[id number]]  (Abnormal) Collected:  07/12/20 0938    Specimen:  Urine, Clean Catch  Updated:  07/12/20 1003     Color, UA Yellow     Appearance, UA Clear     pH, UA <=5.0     Specific Gravity, UA 1.023     Glucose, UA Negative     Ketones, UA Trace     Bilirubin, UA Negative     Blood, UA Negative     Protein, UA 30 mg/dL (1+)     Leuk Esterase, UA Negative     Nitrite, UA Negative     Urobilinogen, UA 0.2 E.U./dL    CBC Auto Differential [[id number]]  (Abnormal) Collected:  07/12/20 0938    Specimen:  Blood Updated:  07/12/20 0958     WBC 7.79 10*3/mm3      RBC 3.97 10*6/mm3      Hemoglobin 12.5 g/dL      Hematocrit 36.1 %      MCV 90.9 fL      MCH 31.5 pg      MCHC 34.6 g/dL      RDW 13.0 %      RDW-SD 42.4 fl      MPV 10.2 fL      Platelets 218 10*3/mm3      Neutrophil % 61.0 %      Lymphocyte % 19.8 %      Monocyte % 9.4 %      Eosinophil % 8.0 %      Basophil % 1.3 %      Immature Grans % 0.5 %      Neutrophils, Absolute 4.76 10*3/mm3      Lymphocytes, Absolute 1.54 10*3/mm3      Monocytes, Absolute 0.73 10*3/mm3      Eosinophils, Absolute 0.62 10*3/mm3      Basophils, Absolute 0.10 10*3/mm3      Immature Grans, Absolute 0.04 10*3/mm3      nRBC 0.0 /100 WBC         Hospital Course:  This is a 60-year-old  gentleman who sees Dr. Walden for primary care.  He presented to the emergency department on 7/12 and was admitted by Dr. Zuleta.  He presented with complaints of weakness and low blood pressure.  He had issues with heat exhaustion and dehydration.  He works outside and uses a welding torch.  He unfortunately also abuses alcohol, which he understands can make him more dehydrated. He tells me that he has been admitted to the hospital for a similar presentation in the past.    He was admitted and given significant hydration.  He has had complete resolution of his acute renal failure.  He has had repletion of electrolytes.  He has had normalization of his blood pressures to the point where his antihypertensives need to be resumed.    He feels much, much better and can be discharged  "today.    He has shown no signs of alcohol withdrawal.  He tells me that he was previously doing well with Alcoholics Anonymous and understands that he needs to go back.    We discussed that he is on several medications that can also make his renal function and blood pressure worse if he continues to have repeated episodes of dehydration.    Physical Exam on Discharge:  /69 (BP Location: Left arm, Patient Position: Lying)   Pulse 56   Temp 97.8 °F (36.6 °C) (Oral)   Resp 16   Ht 167.6 cm (66\")   Wt 85.3 kg (188 lb)   SpO2 94%   BMI 30.34 kg/m²   Physical Exam   Constitutional: He is oriented to person, place, and time. He appears well-developed and well-nourished.   Up in the chair.  No distress.  No family present.  Discussed with his nurse, Mari.   HENT:   Head: Normocephalic and atraumatic.   Eyes: Pupils are equal, round, and reactive to light. Conjunctivae and EOM are normal.   Neck: Neck supple. No JVD present.   Cardiovascular: Normal rate, regular rhythm, normal heart sounds and intact distal pulses.   Pulmonary/Chest: Effort normal. No respiratory distress.   Abdominal: Soft. Bowel sounds are normal. He exhibits no distension. There is no tenderness.   Musculoskeletal: Normal range of motion. He exhibits no edema, tenderness or deformity.   Neurological: He is alert and oriented to person, place, and time. He displays normal reflexes. No cranial nerve deficit. He exhibits normal muscle tone.   Skin: Skin is warm and dry. No rash noted.   Psychiatric: He has a normal mood and affect. His behavior is normal. Judgment and thought content normal.     Condition on Discharge: Good.    Discharge Disposition:  Home or Self Care    Discharge Medications:     Discharge Medications      Changes to Medications      Instructions Start Date   Insulin Glargine 100 UNIT/ML injection pen  Commonly known as:  LANTUS SOLOSTAR  What changed:  Another medication with the same name was removed. Continue taking " this medication, and follow the directions you see here.   10 Units, Subcutaneous, Nightly         Continue These Medications      Instructions Start Date   allopurinol 100 MG tablet  Commonly known as:  Zyloprim   100 mg, Oral, Daily      cetirizine 10 MG tablet  Commonly known as:  zyrTEC   10 mg, Oral, Daily      colchicine 0.6 MG tablet   0.6 mg, Oral, Daily      diclofenac 75 MG EC tablet  Commonly known as:  VOLTAREN   75 mg, Oral, 2 Times Daily      glipizide 10 MG tablet  Commonly known as:  GLUCOTROL   10 mg, Oral, 2 Times Daily Before Meals      lisinopril 20 MG tablet  Commonly known as:  PRINIVIL,ZESTRIL   20 mg, Oral, Daily      lovastatin 20 MG tablet  Commonly known as:  MEVACOR   20 mg, Oral, Nightly      metFORMIN 1000 MG tablet  Commonly known as:  GLUCOPHAGE   1,000 mg, Oral, 2 Times Daily With Meals      SITagliptin 100 MG tablet  Commonly known as:  JANUVIA   100 mg, Oral, Daily           Discharge Diet:   Diet Instructions     Diet: Consistent Carbohydrate; Thin      Discharge Diet:  Consistent Carbohydrate    Fluid Consistency:  Thin        Activity at Discharge:   Activity Instructions     Activity as Tolerated          Follow-up Appointments:   Dr. Walden in 1 week.     Test Results Pending at Discharge: None.     Anjel Stallworth DO  07/13/20  10:20    Time: 25 minutes.

## 2020-07-13 NOTE — PLAN OF CARE
Patient denies pain/nausea. Tolerating diet. Ambulating well. IVF cont. Monitoring patient w/ CIWA protocol. F/u on potassium lab WNL following replacement on day shift. Will cont to monitor.     Problem: Patient Care Overview  Goal: Plan of Care Review  Outcome: Ongoing (interventions implemented as appropriate)  Flowsheets (Taken 7/13/2020 0211)  Progress: no change  Plan of Care Reviewed With: patient

## 2020-07-22 ENCOUNTER — LAB (OUTPATIENT)
Dept: LAB | Facility: HOSPITAL | Age: 60
End: 2020-07-22

## 2020-07-22 ENCOUNTER — TRANSCRIBE ORDERS (OUTPATIENT)
Dept: ADMINISTRATIVE | Facility: HOSPITAL | Age: 60
End: 2020-07-22

## 2020-07-22 DIAGNOSIS — E87.5 HYPERKALEMIA: Primary | ICD-10-CM

## 2020-07-22 DIAGNOSIS — E87.5 HYPERKALEMIA: ICD-10-CM

## 2020-07-22 LAB
ANION GAP SERPL CALCULATED.3IONS-SCNC: 12 MMOL/L (ref 5–15)
BUN SERPL-MCNC: 39 MG/DL (ref 6–20)
BUN/CREAT SERPL: 24.7 (ref 7–25)
CALCIUM SPEC-SCNC: 9 MG/DL (ref 8.6–10.5)
CHLORIDE SERPL-SCNC: 100 MMOL/L (ref 98–107)
CO2 SERPL-SCNC: 25 MMOL/L (ref 22–29)
CREAT SERPL-MCNC: 1.58 MG/DL (ref 0.76–1.27)
GFR SERPL CREATININE-BSD FRML MDRD: 45 ML/MIN/1.73
GLUCOSE SERPL-MCNC: 376 MG/DL (ref 65–99)
POTASSIUM SERPL-SCNC: 5.5 MMOL/L (ref 3.5–5.2)
SODIUM SERPL-SCNC: 137 MMOL/L (ref 136–145)

## 2020-07-22 PROCEDURE — 36415 COLL VENOUS BLD VENIPUNCTURE: CPT

## 2020-07-22 PROCEDURE — 80048 BASIC METABOLIC PNL TOTAL CA: CPT | Performed by: INTERNAL MEDICINE

## 2020-07-23 ENCOUNTER — APPOINTMENT (OUTPATIENT)
Dept: CT IMAGING | Facility: HOSPITAL | Age: 60
End: 2020-07-23

## 2020-07-23 ENCOUNTER — HOSPITAL ENCOUNTER (EMERGENCY)
Facility: HOSPITAL | Age: 60
Discharge: HOME | End: 2020-07-23
Attending: EMERGENCY MEDICINE | Admitting: EMERGENCY MEDICINE

## 2020-07-23 VITALS
BODY MASS INDEX: 30.86 KG/M2 | HEIGHT: 66 IN | TEMPERATURE: 98 F | WEIGHT: 192 LBS | DIASTOLIC BLOOD PRESSURE: 74 MMHG | RESPIRATION RATE: 18 BRPM | OXYGEN SATURATION: 96 % | SYSTOLIC BLOOD PRESSURE: 118 MMHG | HEART RATE: 85 BPM

## 2020-07-23 DIAGNOSIS — N17.9 AKI (ACUTE KIDNEY INJURY) (HCC): Primary | ICD-10-CM

## 2020-07-23 LAB
ALBUMIN SERPL-MCNC: 4.5 G/DL (ref 3.5–5.2)
ALBUMIN/GLOB SERPL: 1.9 G/DL
ALP SERPL-CCNC: 101 U/L (ref 39–117)
ALT SERPL W P-5'-P-CCNC: 18 U/L (ref 1–41)
ANION GAP SERPL CALCULATED.3IONS-SCNC: 14 MMOL/L (ref 5–15)
AST SERPL-CCNC: 14 U/L (ref 1–40)
BACTERIA UR QL AUTO: ABNORMAL /HPF
BASOPHILS # BLD AUTO: 0.11 10*3/MM3 (ref 0–0.2)
BASOPHILS NFR BLD AUTO: 1.3 % (ref 0–1.5)
BILIRUB SERPL-MCNC: <0.2 MG/DL (ref 0–1.2)
BILIRUB UR QL STRIP: NEGATIVE
BUN SERPL-MCNC: 39 MG/DL (ref 6–20)
BUN/CREAT SERPL: 21.5 (ref 7–25)
CALCIUM SPEC-SCNC: 9.1 MG/DL (ref 8.6–10.5)
CHLORIDE SERPL-SCNC: 98 MMOL/L (ref 98–107)
CLARITY UR: CLEAR
CO2 SERPL-SCNC: 25 MMOL/L (ref 22–29)
COLOR UR: YELLOW
CREAT SERPL-MCNC: 1.81 MG/DL (ref 0.76–1.27)
DEPRECATED RDW RBC AUTO: 45.1 FL (ref 37–54)
EOSINOPHIL # BLD AUTO: 0.74 10*3/MM3 (ref 0–0.4)
EOSINOPHIL NFR BLD AUTO: 8.5 % (ref 0.3–6.2)
ERYTHROCYTE [DISTWIDTH] IN BLOOD BY AUTOMATED COUNT: 13.1 % (ref 12.3–15.4)
GFR SERPL CREATININE-BSD FRML MDRD: 39 ML/MIN/1.73
GLOBULIN UR ELPH-MCNC: 2.4 GM/DL
GLUCOSE SERPL-MCNC: 368 MG/DL (ref 65–99)
GLUCOSE UR STRIP-MCNC: ABNORMAL MG/DL
HCT VFR BLD AUTO: 35.6 % (ref 37.5–51)
HGB BLD-MCNC: 12 G/DL (ref 13–17.7)
HGB UR QL STRIP.AUTO: NEGATIVE
HOLD SPECIMEN: NORMAL
HOLD SPECIMEN: NORMAL
HYALINE CASTS UR QL AUTO: ABNORMAL /LPF
IMM GRANULOCYTES # BLD AUTO: 0.04 10*3/MM3 (ref 0–0.05)
IMM GRANULOCYTES NFR BLD AUTO: 0.5 % (ref 0–0.5)
KETONES UR QL STRIP: NEGATIVE
LEUKOCYTE ESTERASE UR QL STRIP.AUTO: NEGATIVE
LYMPHOCYTES # BLD AUTO: 2.23 10*3/MM3 (ref 0.7–3.1)
LYMPHOCYTES NFR BLD AUTO: 25.6 % (ref 19.6–45.3)
MCH RBC QN AUTO: 31.8 PG (ref 26.6–33)
MCHC RBC AUTO-ENTMCNC: 33.7 G/DL (ref 31.5–35.7)
MCV RBC AUTO: 94.4 FL (ref 79–97)
MONOCYTES # BLD AUTO: 0.97 10*3/MM3 (ref 0.1–0.9)
MONOCYTES NFR BLD AUTO: 11.1 % (ref 5–12)
NEUTROPHILS NFR BLD AUTO: 4.63 10*3/MM3 (ref 1.7–7)
NEUTROPHILS NFR BLD AUTO: 53 % (ref 42.7–76)
NITRITE UR QL STRIP: NEGATIVE
NRBC BLD AUTO-RTO: 0 /100 WBC (ref 0–0.2)
PH UR STRIP.AUTO: <=5 [PH] (ref 5–8)
PLATELET # BLD AUTO: 272 10*3/MM3 (ref 140–450)
PMV BLD AUTO: 10.1 FL (ref 6–12)
POTASSIUM SERPL-SCNC: 4.9 MMOL/L (ref 3.5–5.2)
PROT SERPL-MCNC: 6.9 G/DL (ref 6–8.5)
PROT UR QL STRIP: ABNORMAL
RBC # BLD AUTO: 3.77 10*6/MM3 (ref 4.14–5.8)
RBC # UR: ABNORMAL /HPF
REF LAB TEST METHOD: ABNORMAL
SODIUM SERPL-SCNC: 137 MMOL/L (ref 136–145)
SP GR UR STRIP: 1.02 (ref 1–1.03)
SQUAMOUS #/AREA URNS HPF: ABNORMAL /HPF
UROBILINOGEN UR QL STRIP: ABNORMAL
WBC # BLD AUTO: 8.72 10*3/MM3 (ref 3.4–10.8)
WBC UR QL AUTO: ABNORMAL /HPF
WHOLE BLOOD HOLD SPECIMEN: NORMAL
WHOLE BLOOD HOLD SPECIMEN: NORMAL

## 2020-07-23 PROCEDURE — 80053 COMPREHEN METABOLIC PANEL: CPT | Performed by: EMERGENCY MEDICINE

## 2020-07-23 PROCEDURE — 81001 URINALYSIS AUTO W/SCOPE: CPT | Performed by: EMERGENCY MEDICINE

## 2020-07-23 PROCEDURE — 74176 CT ABD & PELVIS W/O CONTRAST: CPT

## 2020-07-23 PROCEDURE — 85025 COMPLETE CBC W/AUTO DIFF WBC: CPT | Performed by: EMERGENCY MEDICINE

## 2020-07-23 PROCEDURE — 99283 EMERGENCY DEPT VISIT LOW MDM: CPT

## 2020-07-23 RX ADMIN — SODIUM CHLORIDE 1000 ML: 9 INJECTION, SOLUTION INTRAVENOUS at 21:51

## 2020-07-24 NOTE — DISCHARGE INSTRUCTIONS
Amadeo,    Please call Dr. Walden tomorrow. When you talk to him tell him that your potassium was okay in the ED but your creatinine (a kidney function) was 1.81 (which is higher than normal of around 0.7-1.30). There are a number of reasons this can happen, but you being on Voltaren as well as your history of diabetes can all cause this. I did provide you with some fluids here and have given you the name of a kidney doctor (Dr. De Luna) for which you will have to call for follow up. You should see your doctor to have your kidney levels re-checked to assure they are not worsening.

## 2020-08-04 ENCOUNTER — TRANSCRIBE ORDERS (OUTPATIENT)
Dept: ADMINISTRATIVE | Facility: HOSPITAL | Age: 60
End: 2020-08-04

## 2020-08-04 DIAGNOSIS — N18.9 CHRONIC KIDNEY DISEASE, UNSPECIFIED CKD STAGE: Primary | ICD-10-CM

## 2020-08-11 ENCOUNTER — HOSPITAL ENCOUNTER (OUTPATIENT)
Dept: ULTRASOUND IMAGING | Facility: HOSPITAL | Age: 60
Discharge: HOME | End: 2020-08-11
Admitting: INTERNAL MEDICINE

## 2020-08-11 DIAGNOSIS — N18.9 CHRONIC KIDNEY DISEASE, UNSPECIFIED CKD STAGE: ICD-10-CM

## 2020-08-11 PROCEDURE — 76775 US EXAM ABDO BACK WALL LIM: CPT

## 2021-07-21 ENCOUNTER — TRANSCRIBE ORDERS (OUTPATIENT)
Dept: ADMINISTRATIVE | Facility: HOSPITAL | Age: 61
End: 2021-07-21

## 2021-07-21 DIAGNOSIS — N18.4 CHRONIC KIDNEY DISEASE, STAGE IV (SEVERE) (HCC): Primary | ICD-10-CM

## 2021-07-27 ENCOUNTER — HOSPITAL ENCOUNTER (EMERGENCY)
Facility: HOSPITAL | Age: 61
Discharge: HOME | End: 2021-07-27
Admitting: EMERGENCY MEDICINE

## 2021-07-27 VITALS
SYSTOLIC BLOOD PRESSURE: 169 MMHG | HEIGHT: 66 IN | HEART RATE: 105 BPM | OXYGEN SATURATION: 98 % | DIASTOLIC BLOOD PRESSURE: 87 MMHG | RESPIRATION RATE: 16 BRPM | TEMPERATURE: 100.9 F | BODY MASS INDEX: 30.53 KG/M2 | WEIGHT: 190 LBS

## 2021-07-27 DIAGNOSIS — E87.5 HYPERKALEMIA: ICD-10-CM

## 2021-07-27 DIAGNOSIS — T67.9XXA HEAT EXPOSURE, INITIAL ENCOUNTER: Primary | ICD-10-CM

## 2021-07-27 LAB
ALBUMIN SERPL-MCNC: 4.4 G/DL (ref 3.5–5.2)
ALBUMIN/GLOB SERPL: 1.8 G/DL
ALP SERPL-CCNC: 93 U/L (ref 39–117)
ALT SERPL W P-5'-P-CCNC: 11 U/L (ref 1–41)
ANION GAP SERPL CALCULATED.3IONS-SCNC: 13 MMOL/L (ref 5–15)
AST SERPL-CCNC: 14 U/L (ref 1–40)
BACTERIA UR QL AUTO: ABNORMAL /HPF
BASOPHILS # BLD AUTO: 0.02 10*3/MM3 (ref 0–0.2)
BASOPHILS NFR BLD AUTO: 0.3 % (ref 0–1.5)
BILIRUB SERPL-MCNC: 0.3 MG/DL (ref 0–1.2)
BILIRUB UR QL STRIP: NEGATIVE
BUN SERPL-MCNC: 46 MG/DL (ref 6–20)
BUN/CREAT SERPL: 44.7 (ref 7–25)
CALCIUM SPEC-SCNC: 8.9 MG/DL (ref 8.6–10.5)
CHLORIDE SERPL-SCNC: 105 MMOL/L (ref 98–107)
CK SERPL-CCNC: 63 U/L (ref 20–200)
CLARITY UR: CLEAR
CO2 SERPL-SCNC: 20 MMOL/L (ref 22–29)
COLOR UR: YELLOW
CREAT SERPL-MCNC: 1.03 MG/DL (ref 0.76–1.27)
D-LACTATE SERPL-SCNC: 1.2 MMOL/L (ref 0.5–2)
DEPRECATED RDW RBC AUTO: 44 FL (ref 37–54)
EOSINOPHIL # BLD AUTO: 0.21 10*3/MM3 (ref 0–0.4)
EOSINOPHIL NFR BLD AUTO: 3.1 % (ref 0.3–6.2)
ERYTHROCYTE [DISTWIDTH] IN BLOOD BY AUTOMATED COUNT: 12.7 % (ref 12.3–15.4)
GFR SERPL CREATININE-BSD FRML MDRD: 74 ML/MIN/1.73
GFR SERPL CREATININE-BSD FRML MDRD: 90 ML/MIN/1.73
GLOBULIN UR ELPH-MCNC: 2.5 GM/DL
GLUCOSE SERPL-MCNC: 237 MG/DL (ref 65–99)
GLUCOSE UR STRIP-MCNC: ABNORMAL MG/DL
HCT VFR BLD AUTO: 37.1 % (ref 37.5–51)
HGB BLD-MCNC: 12.7 G/DL (ref 13–17.7)
HGB UR QL STRIP.AUTO: NEGATIVE
HOLD SPECIMEN: NORMAL
HOLD SPECIMEN: NORMAL
HYALINE CASTS UR QL AUTO: ABNORMAL /LPF
IMM GRANULOCYTES # BLD AUTO: 0.03 10*3/MM3 (ref 0–0.05)
IMM GRANULOCYTES NFR BLD AUTO: 0.4 % (ref 0–0.5)
KETONES UR QL STRIP: NEGATIVE
LEUKOCYTE ESTERASE UR QL STRIP.AUTO: NEGATIVE
LIPASE SERPL-CCNC: 101 U/L (ref 13–60)
LYMPHOCYTES # BLD AUTO: 0.31 10*3/MM3 (ref 0.7–3.1)
LYMPHOCYTES NFR BLD AUTO: 4.5 % (ref 19.6–45.3)
MCH RBC QN AUTO: 32.5 PG (ref 26.6–33)
MCHC RBC AUTO-ENTMCNC: 34.2 G/DL (ref 31.5–35.7)
MCV RBC AUTO: 94.9 FL (ref 79–97)
MONOCYTES # BLD AUTO: 0.32 10*3/MM3 (ref 0.1–0.9)
MONOCYTES NFR BLD AUTO: 4.7 % (ref 5–12)
NEUTROPHILS NFR BLD AUTO: 5.98 10*3/MM3 (ref 1.7–7)
NEUTROPHILS NFR BLD AUTO: 87 % (ref 42.7–76)
NITRITE UR QL STRIP: NEGATIVE
NRBC BLD AUTO-RTO: 0 /100 WBC (ref 0–0.2)
PH UR STRIP.AUTO: 5.5 [PH] (ref 5–8)
PLATELET # BLD AUTO: 203 10*3/MM3 (ref 140–450)
PMV BLD AUTO: 10.5 FL (ref 6–12)
POTASSIUM SERPL-SCNC: 5.7 MMOL/L (ref 3.5–5.2)
PROT SERPL-MCNC: 6.9 G/DL (ref 6–8.5)
PROT UR QL STRIP: ABNORMAL
RBC # BLD AUTO: 3.91 10*6/MM3 (ref 4.14–5.8)
RBC # UR: ABNORMAL /HPF
REF LAB TEST METHOD: ABNORMAL
SARS-COV-2 RNA PNL SPEC NAA+PROBE: NOT DETECTED
SODIUM SERPL-SCNC: 138 MMOL/L (ref 136–145)
SP GR UR STRIP: 1.02 (ref 1–1.03)
SQUAMOUS #/AREA URNS HPF: ABNORMAL /HPF
TROPONIN T SERPL-MCNC: <0.01 NG/ML (ref 0–0.03)
URINE MYOGLOBIN, QUALITATIVE: NEGATIVE
UROBILINOGEN UR QL STRIP: ABNORMAL
WBC # BLD AUTO: 6.87 10*3/MM3 (ref 3.4–10.8)
WBC UR QL AUTO: ABNORMAL /HPF
WHOLE BLOOD HOLD SPECIMEN: NORMAL

## 2021-07-27 PROCEDURE — 25010000002 ONDANSETRON PER 1 MG: Performed by: NURSE PRACTITIONER

## 2021-07-27 PROCEDURE — 96374 THER/PROPH/DIAG INJ IV PUSH: CPT

## 2021-07-27 PROCEDURE — 82550 ASSAY OF CK (CPK): CPT | Performed by: NURSE PRACTITIONER

## 2021-07-27 PROCEDURE — 85025 COMPLETE CBC W/AUTO DIFF WBC: CPT | Performed by: EMERGENCY MEDICINE

## 2021-07-27 PROCEDURE — 87040 BLOOD CULTURE FOR BACTERIA: CPT | Performed by: NURSE PRACTITIONER

## 2021-07-27 PROCEDURE — 93005 ELECTROCARDIOGRAM TRACING: CPT | Performed by: NURSE PRACTITIONER

## 2021-07-27 PROCEDURE — 87635 SARS-COV-2 COVID-19 AMP PRB: CPT | Performed by: NURSE PRACTITIONER

## 2021-07-27 PROCEDURE — 84484 ASSAY OF TROPONIN QUANT: CPT | Performed by: NURSE PRACTITIONER

## 2021-07-27 PROCEDURE — 83690 ASSAY OF LIPASE: CPT | Performed by: NURSE PRACTITIONER

## 2021-07-27 PROCEDURE — 93005 ELECTROCARDIOGRAM TRACING: CPT | Performed by: EMERGENCY MEDICINE

## 2021-07-27 PROCEDURE — 83605 ASSAY OF LACTIC ACID: CPT | Performed by: NURSE PRACTITIONER

## 2021-07-27 PROCEDURE — 99283 EMERGENCY DEPT VISIT LOW MDM: CPT

## 2021-07-27 PROCEDURE — 81001 URINALYSIS AUTO W/SCOPE: CPT | Performed by: NURSE PRACTITIONER

## 2021-07-27 PROCEDURE — 93010 ELECTROCARDIOGRAM REPORT: CPT | Performed by: INTERNAL MEDICINE

## 2021-07-27 PROCEDURE — 80053 COMPREHEN METABOLIC PANEL: CPT | Performed by: EMERGENCY MEDICINE

## 2021-07-27 PROCEDURE — 83874 ASSAY OF MYOGLOBIN: CPT | Performed by: NURSE PRACTITIONER

## 2021-07-27 RX ORDER — ONDANSETRON 4 MG/1
4 TABLET, ORALLY DISINTEGRATING ORAL EVERY 6 HOURS PRN
Qty: 12 TABLET | Refills: 0 | Status: SHIPPED | OUTPATIENT
Start: 2021-07-27 | End: 2022-06-14

## 2021-07-27 RX ORDER — SODIUM CHLORIDE 0.9 % (FLUSH) 0.9 %
10 SYRINGE (ML) INJECTION AS NEEDED
Status: DISCONTINUED | OUTPATIENT
Start: 2021-07-27 | End: 2021-07-28 | Stop reason: HOSPADM

## 2021-07-27 RX ORDER — ONDANSETRON 2 MG/ML
4 INJECTION INTRAMUSCULAR; INTRAVENOUS ONCE
Status: COMPLETED | OUTPATIENT
Start: 2021-07-27 | End: 2021-07-27

## 2021-07-27 RX ADMIN — ONDANSETRON 4 MG: 2 INJECTION INTRAMUSCULAR; INTRAVENOUS at 21:06

## 2021-07-27 RX ADMIN — SODIUM CHLORIDE 500 ML: 9 INJECTION, SOLUTION INTRAVENOUS at 20:59

## 2021-07-28 ENCOUNTER — HOSPITAL ENCOUNTER (OUTPATIENT)
Dept: ULTRASOUND IMAGING | Facility: HOSPITAL | Age: 61
Discharge: HOME | End: 2021-07-28
Admitting: INTERNAL MEDICINE

## 2021-07-28 DIAGNOSIS — N18.4 CHRONIC KIDNEY DISEASE, STAGE IV (SEVERE) (HCC): ICD-10-CM

## 2021-07-28 LAB
QT INTERVAL: 314 MS
QTC INTERVAL: 417 MS

## 2021-07-28 PROCEDURE — 76775 US EXAM ABDO BACK WALL LIM: CPT

## 2021-08-01 LAB
BACTERIA SPEC AEROBE CULT: NORMAL
BACTERIA SPEC AEROBE CULT: NORMAL

## 2021-08-14 ENCOUNTER — HOSPITAL ENCOUNTER (EMERGENCY)
Facility: HOSPITAL | Age: 61
Discharge: HOME | End: 2021-08-15
Attending: FAMILY MEDICINE | Admitting: FAMILY MEDICINE

## 2021-08-14 DIAGNOSIS — N17.9 AKI (ACUTE KIDNEY INJURY) (HCC): ICD-10-CM

## 2021-08-14 DIAGNOSIS — E86.0 DEHYDRATION: Primary | ICD-10-CM

## 2021-08-14 LAB
ALBUMIN SERPL-MCNC: 4.6 G/DL (ref 3.5–5.2)
ALBUMIN/GLOB SERPL: 2 G/DL
ALP SERPL-CCNC: 106 U/L (ref 39–117)
ALT SERPL W P-5'-P-CCNC: 9 U/L (ref 1–41)
ANION GAP SERPL CALCULATED.3IONS-SCNC: 12 MMOL/L (ref 5–15)
ANION GAP SERPL CALCULATED.3IONS-SCNC: 5 MMOL/L (ref 5–15)
AST SERPL-CCNC: 10 U/L (ref 1–40)
BASOPHILS # BLD AUTO: 0.06 10*3/MM3 (ref 0–0.2)
BASOPHILS NFR BLD AUTO: 1.1 % (ref 0–1.5)
BILIRUB SERPL-MCNC: <0.2 MG/DL (ref 0–1.2)
BUN SERPL-MCNC: 50 MG/DL (ref 6–20)
BUN SERPL-MCNC: 55 MG/DL (ref 6–20)
BUN/CREAT SERPL: 23.5 (ref 7–25)
BUN/CREAT SERPL: 25.6 (ref 7–25)
CALCIUM SPEC-SCNC: 8.3 MG/DL (ref 8.6–10.5)
CALCIUM SPEC-SCNC: 8.9 MG/DL (ref 8.6–10.5)
CHLORIDE SERPL-SCNC: 102 MMOL/L (ref 98–107)
CHLORIDE SERPL-SCNC: 109 MMOL/L (ref 98–107)
CK SERPL-CCNC: 69 U/L (ref 20–200)
CO2 SERPL-SCNC: 23 MMOL/L (ref 22–29)
CO2 SERPL-SCNC: 23 MMOL/L (ref 22–29)
CREAT SERPL-MCNC: 1.95 MG/DL (ref 0.76–1.27)
CREAT SERPL-MCNC: 2.34 MG/DL (ref 0.76–1.27)
DEPRECATED RDW RBC AUTO: 43.1 FL (ref 37–54)
EOSINOPHIL # BLD AUTO: 0.49 10*3/MM3 (ref 0–0.4)
EOSINOPHIL NFR BLD AUTO: 8.6 % (ref 0.3–6.2)
ERYTHROCYTE [DISTWIDTH] IN BLOOD BY AUTOMATED COUNT: 12.5 % (ref 12.3–15.4)
GFR SERPL CREATININE-BSD FRML MDRD: 29 ML/MIN/1.73
GFR SERPL CREATININE-BSD FRML MDRD: 35 ML/MIN/1.73
GFR SERPL CREATININE-BSD FRML MDRD: 35 ML/MIN/1.73
GFR SERPL CREATININE-BSD FRML MDRD: 43 ML/MIN/1.73
GLOBULIN UR ELPH-MCNC: 2.3 GM/DL
GLUCOSE SERPL-MCNC: 119 MG/DL (ref 65–99)
GLUCOSE SERPL-MCNC: 274 MG/DL (ref 65–99)
HCT VFR BLD AUTO: 30.4 % (ref 37.5–51)
HGB BLD-MCNC: 10.8 G/DL (ref 13–17.7)
HOLD SPECIMEN: NORMAL
IMM GRANULOCYTES # BLD AUTO: 0.02 10*3/MM3 (ref 0–0.05)
IMM GRANULOCYTES NFR BLD AUTO: 0.4 % (ref 0–0.5)
INR PPP: 1 (ref 0.91–1.09)
LYMPHOCYTES # BLD AUTO: 1.89 10*3/MM3 (ref 0.7–3.1)
LYMPHOCYTES NFR BLD AUTO: 33.3 % (ref 19.6–45.3)
MCH RBC QN AUTO: 33.5 PG (ref 26.6–33)
MCHC RBC AUTO-ENTMCNC: 35.5 G/DL (ref 31.5–35.7)
MCV RBC AUTO: 94.4 FL (ref 79–97)
MONOCYTES # BLD AUTO: 0.84 10*3/MM3 (ref 0.1–0.9)
MONOCYTES NFR BLD AUTO: 14.8 % (ref 5–12)
NEUTROPHILS NFR BLD AUTO: 2.37 10*3/MM3 (ref 1.7–7)
NEUTROPHILS NFR BLD AUTO: 41.8 % (ref 42.7–76)
NRBC BLD AUTO-RTO: 0 /100 WBC (ref 0–0.2)
PLATELET # BLD AUTO: 215 10*3/MM3 (ref 140–450)
PMV BLD AUTO: 10.3 FL (ref 6–12)
POTASSIUM SERPL-SCNC: 4.6 MMOL/L (ref 3.5–5.2)
POTASSIUM SERPL-SCNC: 4.9 MMOL/L (ref 3.5–5.2)
PROT SERPL-MCNC: 6.9 G/DL (ref 6–8.5)
PROTHROMBIN TIME: 12.4 SECONDS (ref 11.5–13.4)
RBC # BLD AUTO: 3.22 10*6/MM3 (ref 4.14–5.8)
SARS-COV-2 RNA PNL SPEC NAA+PROBE: NOT DETECTED
SODIUM SERPL-SCNC: 137 MMOL/L (ref 136–145)
SODIUM SERPL-SCNC: 137 MMOL/L (ref 136–145)
WBC # BLD AUTO: 5.67 10*3/MM3 (ref 3.4–10.8)
WHOLE BLOOD HOLD SPECIMEN: NORMAL

## 2021-08-14 PROCEDURE — 80053 COMPREHEN METABOLIC PANEL: CPT

## 2021-08-14 PROCEDURE — 85610 PROTHROMBIN TIME: CPT

## 2021-08-14 PROCEDURE — 99283 EMERGENCY DEPT VISIT LOW MDM: CPT

## 2021-08-14 PROCEDURE — 93005 ELECTROCARDIOGRAM TRACING: CPT

## 2021-08-14 PROCEDURE — 93005 ELECTROCARDIOGRAM TRACING: CPT | Performed by: FAMILY MEDICINE

## 2021-08-14 PROCEDURE — 85025 COMPLETE CBC W/AUTO DIFF WBC: CPT

## 2021-08-14 PROCEDURE — 87635 SARS-COV-2 COVID-19 AMP PRB: CPT | Performed by: FAMILY MEDICINE

## 2021-08-14 PROCEDURE — 96360 HYDRATION IV INFUSION INIT: CPT

## 2021-08-14 PROCEDURE — 93010 ELECTROCARDIOGRAM REPORT: CPT | Performed by: INTERNAL MEDICINE

## 2021-08-14 PROCEDURE — 36415 COLL VENOUS BLD VENIPUNCTURE: CPT

## 2021-08-14 PROCEDURE — 82550 ASSAY OF CK (CPK): CPT | Performed by: FAMILY MEDICINE

## 2021-08-14 RX ORDER — SODIUM CHLORIDE 9 MG/ML
125 INJECTION, SOLUTION INTRAVENOUS CONTINUOUS
Status: DISCONTINUED | OUTPATIENT
Start: 2021-08-14 | End: 2021-08-15 | Stop reason: HOSPADM

## 2021-08-14 RX ADMIN — SODIUM CHLORIDE 125 ML/HR: 9 INJECTION, SOLUTION INTRAVENOUS at 23:15

## 2021-08-14 RX ADMIN — SODIUM CHLORIDE 1000 ML: 9 INJECTION, SOLUTION INTRAVENOUS at 21:05

## 2021-08-14 NOTE — ED TRIAGE NOTES
Patient states he had blood drawn today and his PCP called and said he needed to come in because he has high potassium. Patient is unsure how high it was.

## 2021-08-15 VITALS
HEIGHT: 66 IN | WEIGHT: 192 LBS | BODY MASS INDEX: 30.86 KG/M2 | TEMPERATURE: 98.5 F | OXYGEN SATURATION: 98 % | SYSTOLIC BLOOD PRESSURE: 128 MMHG | HEART RATE: 66 BPM | DIASTOLIC BLOOD PRESSURE: 98 MMHG | RESPIRATION RATE: 20 BRPM

## 2021-08-15 LAB
QT INTERVAL: 358 MS
QTC INTERVAL: 410 MS

## 2021-08-15 NOTE — DISCHARGE INSTRUCTIONS
As we discussed, please try and stay out of the sun as much as possible over the next few days, stay well-hydrated and talk to your family physician about repeating her labs in a few days.    Follow up with one of the [facility] physician groups below to setup primary care. If you have trouble making an appointment, please call the [facility] Nurse Line at [phone]    Dr. Yazmin Cain DO, Dr. Annamarie Randolph DO, and PHONG Monte  [facility]  [address] [place] [address], [address] [phone]    Dr. Hi Hogan MD  [facility] - [address] [address], [address], [place] [address] [phone]    Dr. Delvin Soto DO, Dr. Will Servin DO,  PHONG Carl, and PHONG Frank  [facility] - [address] [address], [address], [place] [address] [phone]     Dr. Karen Vargas MD, and PHONG Flowers  [address] [address], [place] [address] [phone]    Dr. Nile Palafox MD and Dr. Nando Marquez MD  [facility]  [address] [place] [address], [address]  [phone]    Dr. Bogdan Carrillo MD  [facility]  [address] [place] [address], [facility] [place], [address] [phone]    Dr. David Sherman MD  [facility]  [address] [place], [place], [address] [phone]

## 2021-08-15 NOTE — ED PROVIDER NOTES
Subjective   This patient is a 61-year-old gentleman who was referred to the ED because of concerns for report of hyperkalemia from his primary care physician.  The patient has no symptoms per se.  He does report that he spends a lot of time outside in the sun, stays hot most of the time and sweats a lot because of his work.  He tries to stay hydrated but ends up not urinating much because of the sweating.  Once out of the sun he has no fever or other systemic symptoms.  He denies chest pain or shortness of breath.          Review of Systems   All other systems reviewed and are negative.      Past Medical History:   Diagnosis Date   • DM (diabetes mellitus) (CMS/HCC)    • ED (erectile dysfunction)    • ETOH abuse    • GERD (gastroesophageal reflux disease)    • Gout    • H. pylori infection    • Hepatic steatosis    • History of pancreatitis 1/6/2017    secondary to etoh.    • Hyperlipidemia    • Hypertension    • Mixed hyperlipidemia    • Pancreatitis, alcoholic, acute        No Known Allergies    Past Surgical History:   Procedure Laterality Date   • UPPER GASTROINTESTINAL ENDOSCOPY  03/2016    noting gastritis, duodenitis, esophagitis. dr whitten        Family History   Problem Relation Age of Onset   • No Known Problems Mother    • No Known Problems Father        Social History     Socioeconomic History   • Marital status:      Spouse name: Not on file   • Number of children: Not on file   • Years of education: Not on file   • Highest education level: Not on file   Tobacco Use   • Smoking status: Never Smoker   • Smokeless tobacco: Never Used   Substance and Sexual Activity   • Alcohol use: Yes     Comment: occ   • Drug use: No   • Sexual activity: Defer           Objective   Physical Exam  Vitals and nursing note reviewed.   Constitutional:       Appearance: He is well-developed.   HENT:      Head: Normocephalic and atraumatic.      Right Ear: External ear normal.      Left Ear: External ear normal.       Nose: Nose normal.   Eyes:      Conjunctiva/sclera: Conjunctivae normal.   Cardiovascular:      Rate and Rhythm: Normal rate and regular rhythm.      Heart sounds: Normal heart sounds.   Pulmonary:      Effort: Pulmonary effort is normal.      Breath sounds: Normal breath sounds.   Abdominal:      General: Bowel sounds are normal.      Palpations: Abdomen is soft.   Musculoskeletal:         General: Normal range of motion.      Cervical back: Normal range of motion and neck supple.   Skin:     General: Skin is warm and dry.      Capillary Refill: Capillary refill takes less than 2 seconds.   Neurological:      Mental Status: He is alert and oriented to person, place, and time.   Psychiatric:         Behavior: Behavior normal.         Thought Content: Thought content normal.         Judgment: Judgment normal.         Procedures           ED Course                                           MDM  Number of Diagnoses or Management Options     Amount and/or Complexity of Data Reviewed  Clinical lab tests: reviewed and ordered  Tests in the medicine section of CPT®: reviewed and ordered  Decide to obtain previous medical records or to obtain history from someone other than the patient: yes    Patient Progress  Patient progress: stable      Final diagnoses:   Dehydration   GANEHS (acute kidney injury) (CMS/[facility])       ED Disposition  ED Disposition     ED Disposition Condition Comment    Discharge Stable           No follow-up provider specified.       Medication List      No changes were made to your prescriptions during this visit.       The patient's initial potassium in our ED was normal however his creatinine was elevated at 2.3.  I suspected that this was to do with dehydration rather than in her kidney disease so hydrated him with a bolus of normal saline and continuous infusion and repeated a BMP.  This showed a more normal potassium of 4.6 and a much improved creatinine from 2.3 to 1.95 and a consequent improvement in  GFR from 29 to 35.  I asked him to try and stay out of the sun as much as possible especially over the next few days, stay well-hydrated and follow-up with his primary care physician for repeat labs in a few days.     Kevin Rodriguez MD  08/15/21 0008

## 2022-06-03 ENCOUNTER — HOSPITAL ENCOUNTER (EMERGENCY)
Facility: HOSPITAL | Age: 62
Discharge: HOME | End: 2022-06-03
Admitting: EMERGENCY MEDICINE

## 2022-06-03 VITALS
OXYGEN SATURATION: 98 % | BODY MASS INDEX: 30.53 KG/M2 | TEMPERATURE: 98.1 F | HEIGHT: 66 IN | HEART RATE: 77 BPM | RESPIRATION RATE: 16 BRPM | SYSTOLIC BLOOD PRESSURE: 125 MMHG | WEIGHT: 190 LBS | DIASTOLIC BLOOD PRESSURE: 66 MMHG

## 2022-06-03 DIAGNOSIS — E87.5 HYPERKALEMIA: Primary | ICD-10-CM

## 2022-06-03 LAB
ALBUMIN SERPL-MCNC: 4.5 G/DL (ref 3.5–5.2)
ALBUMIN/GLOB SERPL: 1.9 G/DL
ALP SERPL-CCNC: 67 U/L (ref 39–117)
ALT SERPL W P-5'-P-CCNC: 16 U/L (ref 1–41)
ANION GAP SERPL CALCULATED.3IONS-SCNC: 11 MMOL/L (ref 5–15)
AST SERPL-CCNC: 17 U/L (ref 1–40)
BASOPHILS # BLD AUTO: 0.07 10*3/MM3 (ref 0–0.2)
BASOPHILS NFR BLD AUTO: 1 % (ref 0–1.5)
BILIRUB SERPL-MCNC: 0.2 MG/DL (ref 0–1.2)
BUN SERPL-MCNC: 34 MG/DL (ref 8–23)
BUN/CREAT SERPL: 22.8 (ref 7–25)
CALCIUM SPEC-SCNC: 9.3 MG/DL (ref 8.6–10.5)
CHLORIDE SERPL-SCNC: 103 MMOL/L (ref 98–107)
CO2 SERPL-SCNC: 25 MMOL/L (ref 22–29)
CREAT SERPL-MCNC: 1.49 MG/DL (ref 0.76–1.27)
DEPRECATED RDW RBC AUTO: 42.3 FL (ref 37–54)
EGFRCR SERPLBLD CKD-EPI 2021: 53.4 ML/MIN/1.73
EOSINOPHIL # BLD AUTO: 0.89 10*3/MM3 (ref 0–0.4)
EOSINOPHIL NFR BLD AUTO: 12.4 % (ref 0.3–6.2)
ERYTHROCYTE [DISTWIDTH] IN BLOOD BY AUTOMATED COUNT: 12.3 % (ref 12.3–15.4)
GLOBULIN UR ELPH-MCNC: 2.4 GM/DL
GLUCOSE SERPL-MCNC: 233 MG/DL (ref 65–99)
HCT VFR BLD AUTO: 39.3 % (ref 37.5–51)
HGB BLD-MCNC: 12.9 G/DL (ref 13–17.7)
IMM GRANULOCYTES # BLD AUTO: 0.03 10*3/MM3 (ref 0–0.05)
IMM GRANULOCYTES NFR BLD AUTO: 0.4 % (ref 0–0.5)
LYMPHOCYTES # BLD AUTO: 1.53 10*3/MM3 (ref 0.7–3.1)
LYMPHOCYTES NFR BLD AUTO: 21.3 % (ref 19.6–45.3)
MAGNESIUM SERPL-MCNC: 2.7 MG/DL (ref 1.6–2.4)
MCH RBC QN AUTO: 31.1 PG (ref 26.6–33)
MCHC RBC AUTO-ENTMCNC: 32.8 G/DL (ref 31.5–35.7)
MCV RBC AUTO: 94.7 FL (ref 79–97)
MONOCYTES # BLD AUTO: 0.64 10*3/MM3 (ref 0.1–0.9)
MONOCYTES NFR BLD AUTO: 8.9 % (ref 5–12)
NEUTROPHILS NFR BLD AUTO: 4.01 10*3/MM3 (ref 1.7–7)
NEUTROPHILS NFR BLD AUTO: 56 % (ref 42.7–76)
NRBC BLD AUTO-RTO: 0 /100 WBC (ref 0–0.2)
PLATELET # BLD AUTO: 224 10*3/MM3 (ref 140–450)
PMV BLD AUTO: 10.1 FL (ref 6–12)
POTASSIUM SERPL-SCNC: 5.5 MMOL/L (ref 3.5–5.2)
PROT SERPL-MCNC: 6.9 G/DL (ref 6–8.5)
RBC # BLD AUTO: 4.15 10*6/MM3 (ref 4.14–5.8)
SODIUM SERPL-SCNC: 139 MMOL/L (ref 136–145)
WBC NRBC COR # BLD: 7.17 10*3/MM3 (ref 3.4–10.8)

## 2022-06-03 PROCEDURE — 93005 ELECTROCARDIOGRAM TRACING: CPT | Performed by: PHYSICIAN ASSISTANT

## 2022-06-03 PROCEDURE — 99283 EMERGENCY DEPT VISIT LOW MDM: CPT

## 2022-06-03 PROCEDURE — 85025 COMPLETE CBC W/AUTO DIFF WBC: CPT | Performed by: PHYSICIAN ASSISTANT

## 2022-06-03 PROCEDURE — 93010 ELECTROCARDIOGRAM REPORT: CPT | Performed by: INTERNAL MEDICINE

## 2022-06-03 PROCEDURE — 83735 ASSAY OF MAGNESIUM: CPT | Performed by: PHYSICIAN ASSISTANT

## 2022-06-03 PROCEDURE — 80053 COMPREHEN METABOLIC PANEL: CPT | Performed by: PHYSICIAN ASSISTANT

## 2022-06-03 RX ORDER — SODIUM CHLORIDE 0.9 % (FLUSH) 0.9 %
10 SYRINGE (ML) INJECTION AS NEEDED
Status: DISCONTINUED | OUTPATIENT
Start: 2022-06-03 | End: 2022-06-03 | Stop reason: HOSPADM

## 2022-06-03 NOTE — ED PROVIDER NOTES
Subjective   History of Present Illness    Patient is a pleasant 62-year-old gentleman with chief complaint of abnormal labs.  The patient describes that he noticed some bilateral calf cramping 2 days ago.  He did seek medical attention with primary care provider.  Laboratory data was completed.  The patient reports that he believes his potassium [place] began to eat several bananas.  His cramping had resolved on its own.  Today, he received a phone call from his primary care provider's office reporting that his potassium was low.  He was advised to the ER to be further evaluated.  The patient denies any symptoms whatsoever.  He denies any further cramping, chest pain, pressure, tightness, weakness, syncope or near syncope.  He believes his issues had resolved.  He reports that this is happened previously and when he came to the ER for repeat labs, his potassium was in normal range.    Review of Systems   Constitutional: Negative.    HENT: Negative.    Eyes: Negative.    Respiratory: Negative.    Cardiovascular: Negative.    Gastrointestinal: Negative.    Genitourinary: Negative.    Musculoskeletal: Negative.    Skin: Negative.    Neurological: Negative.    Psychiatric/Behavioral: Negative.    All other systems reviewed and are negative.      Past Medical History:   Diagnosis Date   • DM (diabetes mellitus) (CMS/HCC)    • ED (erectile dysfunction)    • ETOH abuse    • GERD (gastroesophageal reflux disease)    • Gout    • H. pylori infection    • Hepatic steatosis    • History of pancreatitis 1/6/2017    secondary to etoh.    • Hyperlipidemia    • Hypertension    • Mixed hyperlipidemia    • Pancreatitis, alcoholic, acute        No Known Allergies    Past Surgical History:   Procedure Laterality Date   • UPPER GASTROINTESTINAL ENDOSCOPY  03/2016    noting gastritis, duodenitis, esophagitis. dr whitten        Family History   Problem Relation Age of Onset   • No Known Problems Mother    • No Known Problems Father   "      Social History     Socioeconomic History   • Marital status:    Tobacco Use   • Smoking status: Never Smoker   • Smokeless tobacco: Never Used   Substance and Sexual Activity   • Alcohol use: Yes     Comment: occ   • Drug use: No   • Sexual activity: Defer       Prior to Admission medications    Medication Sig Start Date End Date Taking? Authorizing Provider   allopurinol (ZYLOPRIM) 100 MG tablet Take 1 tablet by mouth Daily. 7/3/18   Janett Delgadillo APRN   cetirizine (zyrTEC) 10 MG tablet Take 10 mg by mouth Daily.    ProviderLane MD   glipiZIDE (GLUCOTROL) 10 MG tablet Take 1 tablet by mouth 2 (Two) Times a Day Before Meals. 7/3/18   Janett Delgadillo APRN   Insulin Glargine (LANTUS SOLOSTAR) 100 UNIT/ML injection pen Inject 10 Units under the skin Every Night. 7/3/18   Janett Delgadillo APRN   lisinopril (PRINIVIL,ZESTRIL) 20 MG tablet Take 1 tablet by mouth Daily. 7/3/18   Janett Delgadillo APRN   lovastatin (MEVACOR) 20 MG tablet Take 1 tablet by mouth Every Night. 7/3/18   Janett Delgadillo APRN   metFORMIN (GLUCOPHAGE) 1000 MG tablet Take 1 tablet by mouth 2 (Two) Times a Day With Meals. 7/3/18   Janett Delgadillo APRN   ondansetron ODT (ZOFRAN-ODT) 4 MG disintegrating tablet Place 1 tablet on the tongue Every 6 (Six) Hours As Needed for Nausea. 7/27/21   Rita Sommers APRN   SITagliptin (JANUVIA) 100 MG tablet Take 100 mg by mouth Daily.    Provider, MD Lane       Medications   sodium chloride 0.9 % flush 10 mL (has no administration in time range)       /61 (BP Location: Right arm, Patient Position: Sitting)   Pulse 72   Temp 98.1 °F (36.7 °C) (Oral)   Resp 18   Ht 167.6 cm (66\")   Wt 86.2 kg (190 lb)   SpO2 98%   BMI 30.67 kg/m²       Objective   Physical Exam  Vitals reviewed.   Constitutional:       Appearance: He is well-developed.   HENT:      Head: Normocephalic and atraumatic.      Right Ear: External ear normal.      Left Ear: External ear normal. "      Nose: Nose normal.   Eyes:      Conjunctiva/sclera: Conjunctivae normal.      Pupils: Pupils are equal, round, and reactive to light.   Neck:      Trachea: No tracheal deviation.   Cardiovascular:      Rate and Rhythm: Normal rate and regular rhythm.      Heart sounds: Normal heart sounds. No murmur heard.    No friction rub. No gallop.   Pulmonary:      Effort: Pulmonary effort is normal. No respiratory distress.      Breath sounds: Normal breath sounds. No wheezing or rales.   Chest:      Chest wall: No tenderness.   Abdominal:      General: Bowel sounds are normal. There is no distension.      Palpations: Abdomen is soft. There is no mass.      Tenderness: There is no abdominal tenderness. There is no guarding or rebound.   Musculoskeletal:         General: No tenderness or deformity. Normal range of motion.      Cervical back: Normal range of motion and neck supple.   Skin:     General: Skin is warm and dry.      Capillary Refill: Capillary refill takes less than 2 seconds.      Coloration: Skin is not pale.      Findings: No erythema or rash.   Neurological:      General: No focal deficit present.      Mental Status: He is alert and oriented to person, place, and time.      Deep Tendon Reflexes: Reflexes are normal and symmetric.   Psychiatric:         Mood and Affect: Mood normal.         Behavior: Behavior normal.         Thought Content: Thought content normal.         Judgment: Judgment normal.         Procedures         Lab Results (last 24 hours)     Procedure Component Value Units Date/Time    CBC & Differential [[id number]]  (Abnormal) Collected: 06/03/22 1001    Specimen: Blood Updated: 06/03/22 1018    Narrative:      The following orders were created for panel order CBC & Differential.  Procedure                               Abnormality         Status                     ---------                               -----------         ------                     CBC Auto Differential[[id number]]         Abnormal            Final result                 Please view results for these tests on the individual orders.    Comprehensive Metabolic Panel [[id number]]  (Abnormal) Collected: 06/03/22 1001    Specimen: Blood Updated: 06/03/22 1041     Glucose 233 mg/dL      BUN 34 mg/dL      Creatinine 1.49 mg/dL      Sodium 139 mmol/L      Potassium 5.5 mmol/L      Chloride 103 mmol/L      CO2 25.0 mmol/L      Calcium 9.3 mg/dL      Total Protein 6.9 g/dL      Albumin 4.50 g/dL      ALT (SGPT) 16 U/L      AST (SGOT) 17 U/L      Alkaline Phosphatase 67 U/L      Total Bilirubin 0.2 mg/dL      Globulin 2.4 gm/dL      A/G Ratio 1.9 g/dL      BUN/Creatinine Ratio 22.8     Anion Gap 11.0 mmol/L      eGFR 53.4 mL/min/1.73      Comment: National Kidney Foundation and American Society of Nephrology (ASN) Task Force recommended calculation based on the Chronic Kidney Disease Epidemiology Collaboration (CKD-EPI) equation refit without adjustment for race.       Narrative:      GFR Normal >60  Chronic Kidney Disease <60  Kidney Failure <15      Magnesium [[id number]]  (Abnormal) Collected: 06/03/22 1001    Specimen: Blood Updated: 06/03/22 1035     Magnesium 2.7 mg/dL     CBC Auto Differential [[id number]]  (Abnormal) Collected: 06/03/22 1001    Specimen: Blood Updated: 06/03/22 1018     WBC 7.17 10*3/mm3      RBC 4.15 10*6/mm3      Hemoglobin 12.9 g/dL      Hematocrit 39.3 %      MCV 94.7 fL      MCH 31.1 pg      MCHC 32.8 g/dL      RDW 12.3 %      RDW-SD 42.3 fl      MPV 10.1 fL      Platelets 224 10*3/mm3      Neutrophil % 56.0 %      Lymphocyte % 21.3 %      Monocyte % 8.9 %      Eosinophil % 12.4 %      Basophil % 1.0 %      Immature Grans % 0.4 %      Neutrophils, Absolute 4.01 10*3/mm3      Lymphocytes, Absolute 1.53 10*3/mm3      Monocytes, Absolute 0.64 10*3/mm3      Eosinophils, Absolute 0.89 10*3/mm3      Basophils, Absolute 0.07 10*3/mm3      Immature Grans, Absolute 0.03 10*3/mm3      nRBC 0.0 /100 WBC           No results  found.    ED Course  ED Course as of 06/03/22 1127   Fri Jun 03, 2022   1125 Patient has been educated of his laboratory data.  He is asymptomatic at this time.  I do recommend the patient follow-up with primary care provider on Monday-in 3 days.  Strict return precaution advised.  Patient voiced understanding he will be discharged in stable condition. [TK]      ED Course User Index  [TK] Dena Garsia PA          [facility]    Final diagnoses:   Hyperkalemia          Dena Garsia PA  06/03/22 1127

## 2022-06-05 LAB
QT INTERVAL: 372 MS
QTC INTERVAL: 415 MS

## 2022-06-14 ENCOUNTER — HOSPITAL ENCOUNTER (INPATIENT)
Facility: HOSPITAL | Age: 62
LOS: 2 days | Discharge: HOME | End: 2022-06-16
Attending: EMERGENCY MEDICINE | Admitting: INTERNAL MEDICINE

## 2022-06-14 ENCOUNTER — APPOINTMENT (OUTPATIENT)
Dept: GENERAL RADIOLOGY | Facility: HOSPITAL | Age: 62
End: 2022-06-14

## 2022-06-14 DIAGNOSIS — T67.9XXA HEAT EXPOSURE, INITIAL ENCOUNTER: Primary | ICD-10-CM

## 2022-06-14 DIAGNOSIS — R77.8 TROPONIN I ABOVE REFERENCE RANGE: ICD-10-CM

## 2022-06-14 DIAGNOSIS — E87.29 HIGH ANION GAP METABOLIC ACIDOSIS: ICD-10-CM

## 2022-06-14 DIAGNOSIS — N18.9 ACUTE RENAL FAILURE SUPERIMPOSED ON CHRONIC KIDNEY DISEASE, UNSPECIFIED CKD STAGE, UNSPECIFIED ACUTE RENAL FAILURE TYPE: ICD-10-CM

## 2022-06-14 DIAGNOSIS — E11.9 TYPE 2 DIABETES MELLITUS WITHOUT COMPLICATION, WITHOUT LONG-TERM CURRENT USE OF INSULIN: ICD-10-CM

## 2022-06-14 DIAGNOSIS — N17.9 ACUTE RENAL FAILURE SUPERIMPOSED ON CHRONIC KIDNEY DISEASE, UNSPECIFIED CKD STAGE, UNSPECIFIED ACUTE RENAL FAILURE TYPE: ICD-10-CM

## 2022-06-14 PROBLEM — I95.9 HYPOTENSION: Status: ACTIVE | Noted: 2022-06-14

## 2022-06-14 PROBLEM — R79.89 ELEVATED TROPONIN: Status: ACTIVE | Noted: 2022-06-14

## 2022-06-14 PROBLEM — E87.1 HYPONATREMIA: Status: ACTIVE | Noted: 2022-06-14

## 2022-06-14 LAB
ALBUMIN SERPL-MCNC: 4.6 G/DL (ref 3.5–5.2)
ALBUMIN/GLOB SERPL: 1.6 G/DL
ALP SERPL-CCNC: 86 U/L (ref 39–117)
ALT SERPL W P-5'-P-CCNC: 28 U/L (ref 1–41)
ANION GAP SERPL CALCULATED.3IONS-SCNC: 20 MMOL/L (ref 5–15)
AST SERPL-CCNC: 28 U/L (ref 1–40)
BASOPHILS # BLD AUTO: 0.05 10*3/MM3 (ref 0–0.2)
BASOPHILS # BLD AUTO: 0.06 10*3/MM3 (ref 0–0.2)
BASOPHILS NFR BLD AUTO: 0.5 % (ref 0–1.5)
BASOPHILS NFR BLD AUTO: 0.7 % (ref 0–1.5)
BILIRUB SERPL-MCNC: 0.4 MG/DL (ref 0–1.2)
BUN SERPL-MCNC: 43 MG/DL (ref 8–23)
BUN/CREAT SERPL: 11.4 (ref 7–25)
CALCIUM SPEC-SCNC: 9.2 MG/DL (ref 8.6–10.5)
CHLORIDE SERPL-SCNC: 88 MMOL/L (ref 98–107)
CK SERPL-CCNC: 255 U/L (ref 20–200)
CO2 SERPL-SCNC: 21 MMOL/L (ref 22–29)
CREAT SERPL-MCNC: 3.77 MG/DL (ref 0.76–1.27)
DEPRECATED RDW RBC AUTO: 42.5 FL (ref 37–54)
DEPRECATED RDW RBC AUTO: 43.5 FL (ref 37–54)
EGFRCR SERPLBLD CKD-EPI 2021: 17.5 ML/MIN/1.73
EOSINOPHIL # BLD AUTO: 0.27 10*3/MM3 (ref 0–0.4)
EOSINOPHIL # BLD AUTO: 0.29 10*3/MM3 (ref 0–0.4)
EOSINOPHIL NFR BLD AUTO: 2.9 % (ref 0.3–6.2)
EOSINOPHIL NFR BLD AUTO: 3.1 % (ref 0.3–6.2)
ERYTHROCYTE [DISTWIDTH] IN BLOOD BY AUTOMATED COUNT: 12.7 % (ref 12.3–15.4)
ERYTHROCYTE [DISTWIDTH] IN BLOOD BY AUTOMATED COUNT: 12.8 % (ref 12.3–15.4)
ETHANOL UR QL: <0.01 %
GLOBULIN UR ELPH-MCNC: 2.8 GM/DL
GLUCOSE BLDC GLUCOMTR-MCNC: 126 MG/DL (ref 70–130)
GLUCOSE BLDC GLUCOMTR-MCNC: 159 MG/DL (ref 70–130)
GLUCOSE SERPL-MCNC: 265 MG/DL (ref 65–99)
HCT VFR BLD AUTO: 37 % (ref 37.5–51)
HCT VFR BLD AUTO: 38.6 % (ref 37.5–51)
HGB BLD-MCNC: 12.6 G/DL (ref 13–17.7)
HGB BLD-MCNC: 12.9 G/DL (ref 13–17.7)
HOLD SPECIMEN: NORMAL
IMM GRANULOCYTES # BLD AUTO: 0.04 10*3/MM3 (ref 0–0.05)
IMM GRANULOCYTES # BLD AUTO: 0.04 10*3/MM3 (ref 0–0.05)
IMM GRANULOCYTES NFR BLD AUTO: 0.4 % (ref 0–0.5)
IMM GRANULOCYTES NFR BLD AUTO: 0.5 % (ref 0–0.5)
LIPASE SERPL-CCNC: 213 U/L (ref 13–60)
LYMPHOCYTES # BLD AUTO: 1.58 10*3/MM3 (ref 0.7–3.1)
LYMPHOCYTES # BLD AUTO: 1.82 10*3/MM3 (ref 0.7–3.1)
LYMPHOCYTES NFR BLD AUTO: 18.2 % (ref 19.6–45.3)
LYMPHOCYTES NFR BLD AUTO: 18.3 % (ref 19.6–45.3)
MAGNESIUM SERPL-MCNC: 2.5 MG/DL (ref 1.6–2.4)
MCH RBC QN AUTO: 31.2 PG (ref 26.6–33)
MCH RBC QN AUTO: 31.3 PG (ref 26.6–33)
MCHC RBC AUTO-ENTMCNC: 33.4 G/DL (ref 31.5–35.7)
MCHC RBC AUTO-ENTMCNC: 34.1 G/DL (ref 31.5–35.7)
MCV RBC AUTO: 92 FL (ref 79–97)
MCV RBC AUTO: 93.5 FL (ref 79–97)
MONOCYTES # BLD AUTO: 0.89 10*3/MM3 (ref 0.1–0.9)
MONOCYTES # BLD AUTO: 0.98 10*3/MM3 (ref 0.1–0.9)
MONOCYTES NFR BLD AUTO: 10.3 % (ref 5–12)
MONOCYTES NFR BLD AUTO: 9.8 % (ref 5–12)
NEUTROPHILS NFR BLD AUTO: 5.82 10*3/MM3 (ref 1.7–7)
NEUTROPHILS NFR BLD AUTO: 6.79 10*3/MM3 (ref 1.7–7)
NEUTROPHILS NFR BLD AUTO: 67.2 % (ref 42.7–76)
NEUTROPHILS NFR BLD AUTO: 68.1 % (ref 42.7–76)
NRBC BLD AUTO-RTO: 0 /100 WBC (ref 0–0.2)
NRBC BLD AUTO-RTO: 0 /100 WBC (ref 0–0.2)
PLATELET # BLD AUTO: 214 10*3/MM3 (ref 140–450)
PLATELET # BLD AUTO: 218 10*3/MM3 (ref 140–450)
PMV BLD AUTO: 10.2 FL (ref 6–12)
PMV BLD AUTO: 10.2 FL (ref 6–12)
POTASSIUM SERPL-SCNC: 4.9 MMOL/L (ref 3.5–5.2)
PROT SERPL-MCNC: 7.4 G/DL (ref 6–8.5)
RBC # BLD AUTO: 4.02 10*6/MM3 (ref 4.14–5.8)
RBC # BLD AUTO: 4.13 10*6/MM3 (ref 4.14–5.8)
SARS-COV-2 RNA PNL SPEC NAA+PROBE: NOT DETECTED
SODIUM SERPL-SCNC: 129 MMOL/L (ref 136–145)
TROPONIN T SERPL-MCNC: 0.03 NG/ML (ref 0–0.03)
TROPONIN T SERPL-MCNC: 0.04 NG/ML (ref 0–0.03)
WBC NRBC COR # BLD: 8.66 10*3/MM3 (ref 3.4–10.8)
WBC NRBC COR # BLD: 9.97 10*3/MM3 (ref 3.4–10.8)
WHOLE BLOOD HOLD COAG: NORMAL
WHOLE BLOOD HOLD COAG: NORMAL
WHOLE BLOOD HOLD SPECIMEN: NORMAL
WHOLE BLOOD HOLD SPECIMEN: NORMAL

## 2022-06-14 PROCEDURE — 99285 EMERGENCY DEPT VISIT HI MDM: CPT

## 2022-06-14 PROCEDURE — 25010000002 HEPARIN (PORCINE) PER 1000 UNITS: Performed by: NURSE PRACTITIONER

## 2022-06-14 PROCEDURE — 82550 ASSAY OF CK (CPK): CPT | Performed by: EMERGENCY MEDICINE

## 2022-06-14 PROCEDURE — 82962 GLUCOSE BLOOD TEST: CPT

## 2022-06-14 PROCEDURE — 71045 X-RAY EXAM CHEST 1 VIEW: CPT

## 2022-06-14 PROCEDURE — 80053 COMPREHEN METABOLIC PANEL: CPT | Performed by: EMERGENCY MEDICINE

## 2022-06-14 PROCEDURE — 63710000001 INSULIN DETEMIR PER 5 UNITS: Performed by: NURSE PRACTITIONER

## 2022-06-14 PROCEDURE — 93010 ELECTROCARDIOGRAM REPORT: CPT | Performed by: EMERGENCY MEDICINE

## 2022-06-14 PROCEDURE — 93005 ELECTROCARDIOGRAM TRACING: CPT | Performed by: EMERGENCY MEDICINE

## 2022-06-14 PROCEDURE — 83690 ASSAY OF LIPASE: CPT | Performed by: NURSE PRACTITIONER

## 2022-06-14 PROCEDURE — 87635 SARS-COV-2 COVID-19 AMP PRB: CPT | Performed by: EMERGENCY MEDICINE

## 2022-06-14 PROCEDURE — 84484 ASSAY OF TROPONIN QUANT: CPT | Performed by: EMERGENCY MEDICINE

## 2022-06-14 PROCEDURE — 82077 ASSAY SPEC XCP UR&BREATH IA: CPT | Performed by: NURSE PRACTITIONER

## 2022-06-14 PROCEDURE — 93005 ELECTROCARDIOGRAM TRACING: CPT

## 2022-06-14 PROCEDURE — 85025 COMPLETE CBC W/AUTO DIFF WBC: CPT | Performed by: EMERGENCY MEDICINE

## 2022-06-14 PROCEDURE — 83735 ASSAY OF MAGNESIUM: CPT | Performed by: EMERGENCY MEDICINE

## 2022-06-14 RX ORDER — SODIUM CHLORIDE 0.9 % (FLUSH) 0.9 %
10 SYRINGE (ML) INJECTION AS NEEDED
Status: DISCONTINUED | OUTPATIENT
Start: 2022-06-14 | End: 2022-06-16 | Stop reason: HOSPADM

## 2022-06-14 RX ORDER — HEPARIN SODIUM 5000 [USP'U]/ML
5000 INJECTION, SOLUTION INTRAVENOUS; SUBCUTANEOUS EVERY 12 HOURS SCHEDULED
Status: DISCONTINUED | OUTPATIENT
Start: 2022-06-14 | End: 2022-06-16 | Stop reason: HOSPADM

## 2022-06-14 RX ORDER — DEXTROSE MONOHYDRATE 25 G/50ML
25 INJECTION, SOLUTION INTRAVENOUS
Status: DISCONTINUED | OUTPATIENT
Start: 2022-06-14 | End: 2022-06-16 | Stop reason: HOSPADM

## 2022-06-14 RX ORDER — ALLOPURINOL 100 MG/1
100 TABLET ORAL DAILY
Status: DISCONTINUED | OUTPATIENT
Start: 2022-06-14 | End: 2022-06-16 | Stop reason: HOSPADM

## 2022-06-14 RX ORDER — ONDANSETRON 2 MG/ML
4 INJECTION INTRAMUSCULAR; INTRAVENOUS EVERY 6 HOURS PRN
Status: DISCONTINUED | OUTPATIENT
Start: 2022-06-14 | End: 2022-06-16 | Stop reason: HOSPADM

## 2022-06-14 RX ORDER — SODIUM CHLORIDE 0.9 % (FLUSH) 0.9 %
10 SYRINGE (ML) INJECTION EVERY 12 HOURS SCHEDULED
Status: DISCONTINUED | OUTPATIENT
Start: 2022-06-14 | End: 2022-06-16 | Stop reason: HOSPADM

## 2022-06-14 RX ORDER — PANTOPRAZOLE SODIUM 40 MG/1
40 TABLET, DELAYED RELEASE ORAL
Status: DISCONTINUED | OUTPATIENT
Start: 2022-06-14 | End: 2022-06-14

## 2022-06-14 RX ORDER — PANTOPRAZOLE SODIUM 40 MG/1
40 TABLET, DELAYED RELEASE ORAL
Status: DISCONTINUED | OUTPATIENT
Start: 2022-06-14 | End: 2022-06-16 | Stop reason: HOSPADM

## 2022-06-14 RX ORDER — SODIUM CHLORIDE 9 MG/ML
100 INJECTION, SOLUTION INTRAVENOUS CONTINUOUS
Status: DISCONTINUED | OUTPATIENT
Start: 2022-06-14 | End: 2022-06-16 | Stop reason: HOSPADM

## 2022-06-14 RX ORDER — NICOTINE POLACRILEX 4 MG
15 LOZENGE BUCCAL
Status: DISCONTINUED | OUTPATIENT
Start: 2022-06-14 | End: 2022-06-16 | Stop reason: HOSPADM

## 2022-06-14 RX ORDER — ACETAMINOPHEN 160 MG/5ML
650 SOLUTION ORAL EVERY 4 HOURS PRN
Status: DISCONTINUED | OUTPATIENT
Start: 2022-06-14 | End: 2022-06-16 | Stop reason: HOSPADM

## 2022-06-14 RX ORDER — INSULIN LISPRO 100 [IU]/ML
0-9 INJECTION, SOLUTION INTRAVENOUS; SUBCUTANEOUS
Status: DISCONTINUED | OUTPATIENT
Start: 2022-06-14 | End: 2022-06-16 | Stop reason: HOSPADM

## 2022-06-14 RX ORDER — ACETAMINOPHEN 325 MG/1
650 TABLET ORAL EVERY 4 HOURS PRN
Status: DISCONTINUED | OUTPATIENT
Start: 2022-06-14 | End: 2022-06-16 | Stop reason: HOSPADM

## 2022-06-14 RX ORDER — ONDANSETRON 4 MG/1
4 TABLET, FILM COATED ORAL EVERY 6 HOURS PRN
Status: DISCONTINUED | OUTPATIENT
Start: 2022-06-14 | End: 2022-06-16 | Stop reason: HOSPADM

## 2022-06-14 RX ORDER — ACETAMINOPHEN 650 MG/1
650 SUPPOSITORY RECTAL EVERY 4 HOURS PRN
Status: DISCONTINUED | OUTPATIENT
Start: 2022-06-14 | End: 2022-06-16 | Stop reason: HOSPADM

## 2022-06-14 RX ADMIN — SODIUM CHLORIDE, POTASSIUM CHLORIDE, SODIUM LACTATE AND CALCIUM CHLORIDE 1000 ML: 600; 310; 30; 20 INJECTION, SOLUTION INTRAVENOUS at 15:43

## 2022-06-14 RX ADMIN — ACETAMINOPHEN 650 MG: 325 TABLET ORAL at 23:12

## 2022-06-14 RX ADMIN — INSULIN DETEMIR 15 UNITS: 100 INJECTION, SOLUTION SUBCUTANEOUS at 23:13

## 2022-06-14 RX ADMIN — HEPARIN SODIUM 5000 UNITS: 5000 INJECTION INTRAVENOUS; SUBCUTANEOUS at 22:53

## 2022-06-14 RX ADMIN — SODIUM CHLORIDE 125 ML/HR: 9 INJECTION, SOLUTION INTRAVENOUS at 22:10

## 2022-06-14 NOTE — ED PROVIDER NOTES
Subjective   Patient is 62 years old who came the ER because he feels dehydrated he had drank some alcohol recently and has been working outside in the sun his blood pressure was low denies any chest pain denies any shortness of breath denies any fever.  He is EKG showed nonspecific ST-T wave changes is some early repolarization in the inferior leads which has been seen in the prior EKGs also.      Hypotension  Location:  Blood pressure dehydration  Severity:  Moderate  Onset quality:  Gradual  Timing:  Constant  Progression:  Worsening  Chronicity:  New  Associated symptoms: nausea    Associated symptoms: no abdominal pain, no chest pain, no congestion, no cough, no diarrhea, no ear pain, no fatigue, no fever, no headaches, no myalgias, no rash, no rhinorrhea, no shortness of breath, no vomiting and no wheezing        Review of Systems   Constitutional: Negative.  Negative for fatigue and fever.   HENT: Negative.  Negative for congestion, ear pain and rhinorrhea.    Respiratory: Negative for cough, shortness of breath and wheezing.    Cardiovascular: Negative for chest pain.   Gastrointestinal: Positive for nausea. Negative for abdominal distention, abdominal pain, diarrhea and vomiting.   Endocrine: Negative.    Genitourinary: Negative.    Musculoskeletal: Negative.  Negative for back pain, myalgias and neck pain.   Skin: Negative for color change, pallor and rash.   Neurological: Negative.  Negative for syncope, weakness, light-headedness, numbness and headaches.   Hematological: Negative.  Does not bruise/bleed easily.   All other systems reviewed and are negative.      Past Medical History:   Diagnosis Date   • DM (diabetes mellitus) (CMS/HCC)    • ED (erectile dysfunction)    • ETOH abuse    • GERD (gastroesophageal reflux disease)    • Gout    • H. pylori infection    • Hepatic steatosis    • History of pancreatitis 1/6/2017    secondary to etoh.    • Hyperlipidemia    • Hypertension    • Mixed hyperlipidemia     • Pancreatitis, alcoholic, acute        No Known Allergies    Past Surgical History:   Procedure Laterality Date   • UPPER GASTROINTESTINAL ENDOSCOPY  03/2016    noting gastritis, duodenitis, esophagitis. dr whitten        Family History   Problem Relation Age of Onset   • No Known Problems Mother    • No Known Problems Father        Social History     Socioeconomic History   • Marital status:    Tobacco Use   • Smoking status: Never Smoker   • Smokeless tobacco: Never Used   Substance and Sexual Activity   • Alcohol use: Yes     Comment: occ   • Drug use: No   • Sexual activity: Defer           Objective   Physical Exam  Vitals and nursing note reviewed. Exam conducted with a chaperone present.   Constitutional:       General: He is not in acute distress.     Appearance: Normal appearance. He is well-developed. He is not toxic-appearing.   HENT:      Head: Normocephalic and atraumatic.      Nose: Nose normal.      Mouth/Throat:      Mouth: Mucous membranes are moist.      Pharynx: Uvula midline.   Eyes:      General: Lids are normal. Lids are everted, no foreign bodies appreciated.      Conjunctiva/sclera: Conjunctivae normal.      Pupils: Pupils are equal, round, and reactive to light.   Neck:      Vascular: Normal carotid pulses. No carotid bruit or JVD.      Trachea: Trachea and phonation normal. No tracheal deviation.      Comments: Cervical spine supple laxity tenderness  Cardiovascular:      Rate and Rhythm: Normal rate and regular rhythm.      Chest Wall: PMI is not displaced.      Pulses: Normal pulses.      Heart sounds: Normal heart sounds.     No gallop.   Pulmonary:      Effort: Pulmonary effort is normal. No tachypnea, accessory muscle usage or respiratory distress.      Breath sounds: Normal breath sounds. No stridor. No decreased breath sounds, wheezing, rhonchi or rales.   Abdominal:      General: Bowel sounds are normal. There is no distension.      Palpations: Abdomen is soft.       Tenderness: There is no abdominal tenderness.   Musculoskeletal:         General: No swelling. Normal range of motion.      Cervical back: Full passive range of motion without pain, normal range of motion and neck supple. No rigidity.      Comments: Lower extremity exam bilaterally is unremarkable.  There is no right or left calf tenderness .  There is no palpable venous cord.  No obvious difference in the size of the legs.  No pitting edema.  The dorsalis pedis and posterior tibial femoral and popliteal pulses are palpable and +2 bilaterally.  Homans sign is negative   Skin:     General: Skin is warm and dry.      Capillary Refill: Capillary refill takes less than 2 seconds.      Coloration: Skin is not jaundiced or pale.      Nails: There is no clubbing.   Neurological:      General: No focal deficit present.      Mental Status: He is alert and oriented to person, place, and time.      GCS: GCS eye subscore is 4. GCS verbal subscore is 5. GCS motor subscore is 6.      Cranial Nerves: Cranial nerves are intact. No cranial nerve deficit.      Motor: Motor function is intact.      Gait: Gait normal.      Deep Tendon Reflexes: Reflexes are normal and symmetric. Reflexes normal.   Psychiatric:         Speech: Speech normal.         Behavior: Behavior normal.         Procedures           ED Course  ED Course as of 06/14/22 1650   Tue Jun 14, 2022   1622 Discussed this case with  he reviewed the EKGs will consult on the patient I think his [place] elevation is type II elevation because of renal failure.  He denies any chest pain at this time.  The anion gap is because of metabolic acidosis I do not believe that he is in DKA is not vomiting not tachypneic [TS]   1638 Patient has got acute on chronic renal insufficiency with slightly with CK was given IV fluids.  Is going be admitted to the hospitalist service. [TS]      ED Course User Index  [TS] Kofi Goodwin MD                                                  MDM  Number of Diagnoses or Management Options  Diagnosis management comments: Maintaining dehydration alcohol use working outside       Amount and/or Complexity of Data Reviewed  Clinical lab tests: ordered and reviewed  Tests in the radiology section of CPT®: ordered and reviewed  Tests in the medicine section of CPT®: reviewed and ordered    Risk of Complications, Morbidity, and/or Mortality  Presenting problems: moderate  Diagnostic procedures: moderate  Management options: moderate        Final diagnoses:   Heat exposure, initial encounter   Acute renal failure superimposed on chronic kidney disease, unspecified CKD stage, unspecified acute renal failure type (HCC)   Troponin I above reference range   High anion gap metabolic acidosis       ED Disposition  ED Disposition     ED Disposition   Decision to Admit    Condition   --    Comment   Level of Care: Telemetry [5]   Diagnosis: Heat exposure, initial encounter [[id number]]   Admitting Physician: ALLIE MCDERMOTT [[id number]]   Attending Physician: ALLIE MCDERMOTT [[id number]]   Certification: I Certify That Inpatient Hospital Services Are Medically Necessary For Greater Than 2 Midnights               No follow-up provider specified.       Medication List      No changes were made to your prescriptions during this visit.          Kofi Goodwin MD  06/14/22 1639       Kofi Goodwin MD  06/14/22 1658

## 2022-06-14 NOTE — H&P
",      [facility] Medicine Services  HISTORY AND PHYSICAL    Date of Admission: 6/14/2022  Primary Care Physician: Provider, No Known    Subjective     Chief Complaint: Hypotension, neck pain and heat exposure    History of Present Illness  Amadeo Alexander is a 62-year-old male with a past medical history of insulin-dependent diabetes, intermittent alcohol abuse most recent use last Saturday after a 3-day binge with beer and tequila-patient unable to quantify reports \"lots\".  Other health history hypertension, hyperlipidemia chronic kidney disease- stage IIIa.  Most recent comparison 6/3 GFR 53.4 today 17.5.  Patient works outside in the steel mill.  He did work yesterday and today.  He states he overheated, has done this in the past.  He did have associated leg cramps last evening and hand cramps today, neck pain currently scored 8 on a scale of 1-10, nausea without vomiting that is persistent.  He is also reporting bilateral posterior lung pain located mid back.  He also reports mid epigastric tenderness is felt to be secondary to recent alcohol binge.  He reports symptoms of withdrawal on Sunday however he has been at its baseline Monday and Tuesday.  He has no complaints of shortness of breathing or chest pain. He drove himself to the emergency department for evaluation.  He was noted to have elevated troponin and abnormal EKG, cardiologist were notified and did review EKG.  Creatinine 3.77-baseline 1.5, troponin 0.038, glucose 265, sodium 129, .  Patient is admitted for further evaluation treatment.    Review of Systems   A 10 point review of systems was completed, all negative except for those discussed in HPI    Past Medical History:   Past Medical History:   Diagnosis Date   • DM (diabetes mellitus) (HCC)    • ED (erectile dysfunction)    • ETOH abuse    • GERD (gastroesophageal reflux disease)    • Gout    • H. pylori infection    • Hepatic steatosis    • History of " "pancreatitis 1/6/2017    secondary to etoh.    • Hyperlipidemia    • Hypertension    • Mixed hyperlipidemia    • Pancreatitis, alcoholic, acute        Past Surgical History:   Past Surgical History:   Procedure Laterality Date   • UPPER GASTROINTESTINAL ENDOSCOPY  03/2016    noting gastritis, duodenitis, esophagitis. dr whitten        Family History: family history includes Benign prostatic hyperplasia in his father; Diabetes in his mother.    Social History:  reports that he has never smoked. He has never used smokeless tobacco. He reports current alcohol use. He reports that he does not use drugs.    Code Status: Full, if unable speak for himself his family will speak for him      Allergies:  No Known Allergies    Medications:  No current facility-administered medications on file prior to encounter.     Current Outpatient Medications on File Prior to Encounter   Medication Sig Dispense Refill   • allopurinol (ZYLOPRIM) 100 MG tablet Take 1 tablet by mouth Daily. 30 tablet 0   • glipiZIDE (GLUCOTROL) 10 MG tablet Take 1 tablet by mouth 2 (Two) Times a Day Before Meals. 60 tablet 0   • Insulin Glargine (LANTUS SOLOSTAR) 100 UNIT/ML injection pen Inject 10 Units under the skin Every Night. (Patient taking differently: Inject 40 Units under the skin into the appropriate area as directed Daily.) 3 pen 0   • lisinopril (PRINIVIL,ZESTRIL) 20 MG tablet Take 1 tablet by mouth Daily. (Patient taking differently: Take 20 mg by mouth 2 (Two) Times a Day.) 30 tablet 0   • lovastatin (MEVACOR) 20 MG tablet Take 1 tablet by mouth Every Night. 30 tablet 0   • metFORMIN (GLUCOPHAGE) 1000 MG tablet Take 1 tablet by mouth 2 (Two) Times a Day With Meals. 60 tablet 0     I have utilized all available immediate resources to obtain, update, and review the patient's current medications.    Objective     /72 (BP Location: Right arm, Patient Position: Sitting)   Pulse 89   Temp 98.8 °F (37.1 °C)   Resp 15   Ht 165.1 cm (65\")   Wt " 82.6 kg (182 lb)   SpO2 97%   BMI 30.29 kg/m²   Physical Exam  Vitals reviewed.   Constitutional:       Appearance: He is ill-appearing.   HENT:      Head: Normocephalic and atraumatic.      Mouth/Throat:      Mouth: Mucous membranes are dry.      Pharynx: Oropharynx is clear.   Eyes:      Extraocular Movements: Extraocular movements intact.      Conjunctiva/sclera:      Right eye: Right conjunctiva is injected.      Left eye: Left conjunctiva is injected.   Cardiovascular:      Rate and Rhythm: Normal rate and regular rhythm.   Pulmonary:      Effort: Pulmonary effort is normal.      Breath sounds: Normal breath sounds.   Abdominal:      General: There is no distension.      Palpations: Abdomen is soft.      Tenderness: There is abdominal tenderness ( Mid epigastric region).   Musculoskeletal:         General: Normal range of motion.      Cervical back: Normal range of motion and neck supple.      Right lower leg: No edema.      Left lower leg: No edema.   Skin:     General: Skin is warm and dry.   Neurological:      General: No focal deficit present.      Mental Status: He is alert and oriented to person, place, and time.   Psychiatric:         Mood and Affect: Mood normal.         Behavior: Behavior normal.       Pertinent Data:   Lab Results (last 72 hours)     Procedure Component Value Units Date/Time    Troponin [[id number]] Collected: 06/14/22 1710    Specimen: Blood from Arm, Left Updated: 06/14/22 1717    CK [[id number]]  (Abnormal) Collected: 06/14/22 1503    Specimen: Blood Updated: 06/14/22 1630     Creatine Kinase 255 U/L     CBC Auto Differential [[id number]]  (Abnormal) Collected: 06/14/22 1546    Specimen: Blood Updated: 06/14/22 1602     WBC 8.66 10*3/mm3      RBC 4.02 10*6/mm3      Hemoglobin 12.6 g/dL      Hematocrit 37.0 %      MCV 92.0 fL      MCH 31.3 pg      MCHC 34.1 g/dL      RDW 12.8 %      RDW-SD 42.5 fl      MPV 10.2 fL      Platelets 214 10*3/mm3      Neutrophil % 67.2 %      Lymphocyte %  18.2 %      Monocyte % 10.3 %      Eosinophil % 3.1 %      Basophil % 0.7 %      Immature Grans % 0.5 %      Neutrophils, Absolute 5.82 10*3/mm3      Lymphocytes, Absolute 1.58 10*3/mm3      Monocytes, Absolute 0.89 10*3/mm3      Eosinophils, Absolute 0.27 10*3/mm3      Basophils, Absolute 0.06 10*3/mm3      Immature Grans, Absolute 0.04 10*3/mm3      nRBC 0.0 /100 WBC     Troponin [[id number]]  (Abnormal) Collected: 06/14/22 1503    Specimen: Blood Updated: 06/14/22 1547     Troponin T 0.038 ng/mL     Comprehensive Metabolic Panel [[id number]]  (Abnormal) Collected: 06/14/22 1503    Specimen: Blood Updated: 06/14/22 1536     Glucose 265 mg/dL      BUN 43 mg/dL      Creatinine 3.77 mg/dL      Sodium 129 mmol/L      Potassium 4.9 mmol/L      Comment: Slight hemolysis detected by analyzer. Results may be affected.        Chloride 88 mmol/L      CO2 21.0 mmol/L      Calcium 9.2 mg/dL      Total Protein 7.4 g/dL      Albumin 4.60 g/dL      ALT (SGPT) 28 U/L      AST (SGOT) 28 U/L      Alkaline Phosphatase 86 U/L      Total Bilirubin 0.4 mg/dL      Globulin 2.8 gm/dL      A/G Ratio 1.6 g/dL      BUN/Creatinine Ratio 11.4     Anion Gap 20.0 mmol/L      eGFR 17.5 mL/min/1.73     Magnesium [[id number]]  (Abnormal) Collected: 06/14/22 1503    Specimen: Blood Updated: 06/14/22 1530     Magnesium 2.5 mg/dL     CBC Auto Differential [[id number]]  (Abnormal) Collected: 06/14/22 1503    Specimen: Blood Updated: 06/14/22 1516     WBC 9.97 10*3/mm3      RBC 4.13 10*6/mm3      Hemoglobin 12.9 g/dL      Hematocrit 38.6 %      MCV 93.5 fL      MCH 31.2 pg      MCHC 33.4 g/dL      RDW 12.7 %      RDW-SD 43.5 fl      MPV 10.2 fL      Platelets 218 10*3/mm3      Neutrophil % 68.1 %      Lymphocyte % 18.3 %      Monocyte % 9.8 %      Eosinophil % 2.9 %      Basophil % 0.5 %      Immature Grans % 0.4 %      Neutrophils, Absolute 6.79 10*3/mm3      Lymphocytes, Absolute 1.82 10*3/mm3      Monocytes, Absolute 0.98 10*3/mm3      Eosinophils,  Absolute 0.29 10*3/mm3      Basophils, Absolute 0.05 10*3/mm3      Immature Grans, Absolute 0.04 10*3/mm3      nRBC 0.0 /100 WBC             I have personally reviewed and interpreted the radiology studies and ECG obtained at time of admission.     Assessment / Plan     Assessment:   Active Hospital Problems    Diagnosis    • Heat exposure    • Hypotension    • Hyponatremia    • Elevated troponin    • GANESH (acute kidney injury) (HCC)    • Dehydration    • Type 2 diabetes mellitus with hyperglycemia, with long-term current use of insulin (HCC)    • Alcoholism (HCC)        Plan:   1.  Admit as inpatient  2.  Continue hydration normal saline 125 mL/hour  3.  Home medications reviewed and restarted as appropriate  4.  Heparin 1000 units subcu every 12 hours  5.  Monitor glucose before meals and at bedtime with regular insulin sliding scale coverage, continue long-acting 15 units at bedtime  6.  Add Protonix 40 mg by mouth daily, include now  7.  Supplemental oxygen as needed, continuous pulse oximetry, incentive spirometry  8.  Consult cardiology  9.  Serial troponins and EKGs as scheduled and as needed, cardiac monitoring      I discussed the patient's findings and my recommendations with: Delvin Robin DO  Time spent 40 minutes    Patient seen and examined by me on 6/14/2022 at 4:51 PM.    Electronically signed by PHONG Ma, 06/14/22, 17:37 CDT.

## 2022-06-15 PROBLEM — E78.5 HYPERLIPIDEMIA: Status: ACTIVE | Noted: 2022-06-15

## 2022-06-15 LAB
ALBUMIN SERPL-MCNC: 3.9 G/DL (ref 3.5–5.2)
ALBUMIN/GLOB SERPL: 1.6 G/DL
ALP SERPL-CCNC: 71 U/L (ref 39–117)
ALT SERPL W P-5'-P-CCNC: 23 U/L (ref 1–41)
ANION GAP SERPL CALCULATED.3IONS-SCNC: 12 MMOL/L (ref 5–15)
AST SERPL-CCNC: 22 U/L (ref 1–40)
BILIRUB SERPL-MCNC: 0.3 MG/DL (ref 0–1.2)
BUN SERPL-MCNC: 42 MG/DL (ref 8–23)
BUN/CREAT SERPL: 16.3 (ref 7–25)
CALCIUM SPEC-SCNC: 8.7 MG/DL (ref 8.6–10.5)
CHLORIDE SERPL-SCNC: 98 MMOL/L (ref 98–107)
CHOLEST SERPL-MCNC: 206 MG/DL (ref 0–200)
CK SERPL-CCNC: 166 U/L (ref 20–200)
CO2 SERPL-SCNC: 27 MMOL/L (ref 22–29)
CREAT SERPL-MCNC: 2.57 MG/DL (ref 0.76–1.27)
DEPRECATED RDW RBC AUTO: 43.6 FL (ref 37–54)
EGFRCR SERPLBLD CKD-EPI 2021: 27.8 ML/MIN/1.73
ERYTHROCYTE [DISTWIDTH] IN BLOOD BY AUTOMATED COUNT: 12.8 % (ref 12.3–15.4)
GLOBULIN UR ELPH-MCNC: 2.4 GM/DL
GLUCOSE BLDC GLUCOMTR-MCNC: 128 MG/DL (ref 70–130)
GLUCOSE BLDC GLUCOMTR-MCNC: 151 MG/DL (ref 70–130)
GLUCOSE BLDC GLUCOMTR-MCNC: 175 MG/DL (ref 70–130)
GLUCOSE BLDC GLUCOMTR-MCNC: 183 MG/DL (ref 70–130)
GLUCOSE SERPL-MCNC: 210 MG/DL (ref 65–99)
HBA1C MFR BLD: 10.6 % (ref 4.8–5.6)
HCT VFR BLD AUTO: 36.9 % (ref 37.5–51)
HDLC SERPL-MCNC: 42 MG/DL (ref 40–60)
HGB BLD-MCNC: 12.2 G/DL (ref 13–17.7)
LDLC SERPL CALC-MCNC: 101 MG/DL (ref 0–100)
LDLC/HDLC SERPL: 2.13 {RATIO}
LIPASE SERPL-CCNC: 212 U/L (ref 13–60)
MCH RBC QN AUTO: 31.2 PG (ref 26.6–33)
MCHC RBC AUTO-ENTMCNC: 33.1 G/DL (ref 31.5–35.7)
MCV RBC AUTO: 94.4 FL (ref 79–97)
PLATELET # BLD AUTO: 198 10*3/MM3 (ref 140–450)
PMV BLD AUTO: 10.2 FL (ref 6–12)
POTASSIUM SERPL-SCNC: 4 MMOL/L (ref 3.5–5.2)
PROT SERPL-MCNC: 6.3 G/DL (ref 6–8.5)
QT INTERVAL: 370 MS
QT INTERVAL: 402 MS
QTC INTERVAL: 432 MS
QTC INTERVAL: 436 MS
RBC # BLD AUTO: 3.91 10*6/MM3 (ref 4.14–5.8)
SODIUM SERPL-SCNC: 137 MMOL/L (ref 136–145)
TRIGL SERPL-MCNC: 372 MG/DL (ref 0–150)
TROPONIN T SERPL-MCNC: 0.03 NG/ML (ref 0–0.03)
VLDLC SERPL-MCNC: 63 MG/DL (ref 5–40)
WBC NRBC COR # BLD: 8.38 10*3/MM3 (ref 3.4–10.8)

## 2022-06-15 PROCEDURE — 82550 ASSAY OF CK (CPK): CPT | Performed by: NURSE PRACTITIONER

## 2022-06-15 PROCEDURE — 82962 GLUCOSE BLOOD TEST: CPT

## 2022-06-15 PROCEDURE — 85027 COMPLETE CBC AUTOMATED: CPT | Performed by: NURSE PRACTITIONER

## 2022-06-15 PROCEDURE — 80053 COMPREHEN METABOLIC PANEL: CPT | Performed by: NURSE PRACTITIONER

## 2022-06-15 PROCEDURE — 63710000001 INSULIN DETEMIR PER 5 UNITS: Performed by: NURSE PRACTITIONER

## 2022-06-15 PROCEDURE — 83690 ASSAY OF LIPASE: CPT | Performed by: INTERNAL MEDICINE

## 2022-06-15 PROCEDURE — 25010000002 HEPARIN (PORCINE) PER 1000 UNITS: Performed by: NURSE PRACTITIONER

## 2022-06-15 PROCEDURE — 83036 HEMOGLOBIN GLYCOSYLATED A1C: CPT | Performed by: NURSE PRACTITIONER

## 2022-06-15 PROCEDURE — 84484 ASSAY OF TROPONIN QUANT: CPT | Performed by: NURSE PRACTITIONER

## 2022-06-15 PROCEDURE — 80061 LIPID PANEL: CPT | Performed by: NURSE PRACTITIONER

## 2022-06-15 PROCEDURE — 36415 COLL VENOUS BLD VENIPUNCTURE: CPT | Performed by: NURSE PRACTITIONER

## 2022-06-15 PROCEDURE — 63710000001 INSULIN LISPRO (HUMAN) PER 5 UNITS: Performed by: NURSE PRACTITIONER

## 2022-06-15 RX ORDER — ERGOCALCIFEROL 1.25 MG/1
50000 CAPSULE ORAL 2 TIMES WEEKLY
COMMUNITY

## 2022-06-15 RX ORDER — CARBAMAZEPINE 200 MG/1
200 TABLET ORAL 2 TIMES DAILY
COMMUNITY
End: 2022-06-16 | Stop reason: HOSPADM

## 2022-06-15 RX ORDER — ALLOPURINOL 100 MG/1
100 TABLET ORAL NIGHTLY
COMMUNITY

## 2022-06-15 RX ORDER — FENOFIBRATE 145 MG/1
145 TABLET, COATED ORAL NIGHTLY
COMMUNITY

## 2022-06-15 RX ORDER — ROSUVASTATIN CALCIUM 20 MG/1
20 TABLET, COATED ORAL NIGHTLY
COMMUNITY

## 2022-06-15 RX ORDER — DULAGLUTIDE 3 MG/.5ML
3 INJECTION, SOLUTION SUBCUTANEOUS WEEKLY
COMMUNITY
End: 2022-12-02

## 2022-06-15 RX ORDER — DILTIAZEM HYDROCHLORIDE 180 MG/1
180 CAPSULE, COATED, EXTENDED RELEASE ORAL DAILY
COMMUNITY

## 2022-06-15 RX ORDER — LISINOPRIL 40 MG/1
40 TABLET ORAL 2 TIMES DAILY
Status: ON HOLD | COMMUNITY
End: 2022-06-16 | Stop reason: SDUPTHER

## 2022-06-15 RX ADMIN — HEPARIN SODIUM 5000 UNITS: 5000 INJECTION INTRAVENOUS; SUBCUTANEOUS at 09:00

## 2022-06-15 RX ADMIN — SODIUM CHLORIDE 100 ML/HR: 9 INJECTION, SOLUTION INTRAVENOUS at 17:30

## 2022-06-15 RX ADMIN — INSULIN LISPRO 2 UNITS: 100 INJECTION, SOLUTION INTRAVENOUS; SUBCUTANEOUS at 11:38

## 2022-06-15 RX ADMIN — INSULIN DETEMIR 15 UNITS: 100 INJECTION, SOLUTION SUBCUTANEOUS at 20:35

## 2022-06-15 RX ADMIN — INSULIN LISPRO 2 UNITS: 100 INJECTION, SOLUTION INTRAVENOUS; SUBCUTANEOUS at 17:30

## 2022-06-15 RX ADMIN — ACETAMINOPHEN 650 MG: 325 TABLET ORAL at 07:39

## 2022-06-15 RX ADMIN — SODIUM CHLORIDE 125 ML/HR: 9 INJECTION, SOLUTION INTRAVENOUS at 06:42

## 2022-06-15 RX ADMIN — ACETAMINOPHEN 650 MG: 325 TABLET ORAL at 19:45

## 2022-06-15 RX ADMIN — HEPARIN SODIUM 5000 UNITS: 5000 INJECTION INTRAVENOUS; SUBCUTANEOUS at 20:35

## 2022-06-15 RX ADMIN — PANTOPRAZOLE SODIUM 40 MG: 40 TABLET, DELAYED RELEASE ORAL at 06:42

## 2022-06-15 RX ADMIN — ALLOPURINOL 100 MG: 100 TABLET ORAL at 03:53

## 2022-06-15 RX ADMIN — ALLOPURINOL 100 MG: 100 TABLET ORAL at 09:06

## 2022-06-15 NOTE — PLAN OF CARE
Goal Outcome Evaluation:  Plan of Care Reviewed With: patient        Progress: improving  Outcome Evaluation: VSS.  C/o mild pain in neck only, relieved with Tylenol.  Troponins are trending downward, normal EKG.  NS infusing.  Good urine output.  Pt reports feeling much better this morning.  Safety maintained.  SR 70-73 on tele.

## 2022-06-15 NOTE — PLAN OF CARE
Goal Outcome Evaluation:  Plan of Care Reviewed With: patient        Progress: improving  Outcome Evaluation: VSS. Denies pain this shift. NSR 69-92 on tele. IVF infusing. Kidney function improving. Pt upgraded to diabetic diet this shift. Pt states he feels much better. No signs of alcohol withdrawal this shift. Safety maintained. Will cont to monitor and call MD with any changes.

## 2022-06-15 NOTE — PROGRESS NOTES
[facility] Medicine Services  INPATIENT PROGRESS NOTE    Patient Name: Amadeo Alexander  Date of Admission: 6/14/2022  Today's Date: 06/15/22  Length of Stay: 1  Primary Care Physician: Provider, No Known    Subjective   Chief Complaint: f/u kirill, heat exposure    HPI   Patient is resting comfortably in bed.  Feels well today.  No acute complaints.  No chest pain.  No shortness of breath.  No abdominal discomfort.  Tolerating clear liquid diet.  Would like to eat solid food today.      Review of Systems   All pertinent negatives and positives are as above. All other systems have been reviewed and are negative unless otherwise stated.     Objective    Temp:  [97.5 °F (36.4 °C)-98.8 °F (37.1 °C)] 98.3 °F (36.8 °C)  Heart Rate:  [72-89] 77  Resp:  [15-20] 18  BP: (109-137)/(55-77) 137/77  Physical Exam  GEN: Awake, alert, interactive, in NAD  HEENT:  PERRLA, EOMI, Anicteric, Trachea midline  Lungs:  no wheezing/rales/rhonchi  Heart: RRR, +S1/s2, no rub  ABD: soft, nt/nd, +BS, no guarding/rebound  Extremities: atraumatic, no cyanosis, no edema  Skin: no rashes or lesions  Neuro: AAOx3, no focal deficits  Psych: normal mood & affect      Results Review:  I have reviewed the labs, radiology results, and diagnostic studies.    Laboratory Data:   Results from last 7 days   Lab Units 06/15/22  0029 06/14/22  1546 06/14/22  1503   WBC 10*3/mm3 8.38 8.66 9.97   HEMOGLOBIN g/dL 12.2* 12.6* 12.9*   HEMATOCRIT % 36.9* 37.0* 38.6   PLATELETS 10*3/mm3 198 214 218        Results from last 7 days   Lab Units 06/15/22  0029 06/14/22  1503   SODIUM mmol/L 137 129*   POTASSIUM mmol/L 4.0 4.9   CHLORIDE mmol/L 98 88*   CO2 mmol/L 27.0 21.0*   BUN mg/dL 42* 43*   CREATININE mg/dL 2.57* 3.77*   CALCIUM mg/dL 8.7 9.2   BILIRUBIN mg/dL 0.3 0.4   ALK PHOS U/L 71 86   ALT (SGPT) U/L 23 28   AST (SGOT) U/L 22 28   GLUCOSE mg/dL 210* 265*       Culture Data:   No results found for: BLOODCX, URINECX, WOUNDCX,  MRSACX, RESPCX, STOOLCX    Radiology Data:   Imaging Results (Last 24 Hours)     Procedure Component Value Units Date/Time    XR Chest 1 View [[id number]] Collected: 06/14/22 1632     Updated: 06/14/22 1635    Narrative:      XR CHEST 1 VW- 6/14/2022 4:08 PM CDT     HISTORY: Chest Pain protocol     COMPARISON: Chest exam dated 7/12/2020.     FINDINGS:      No lung consolidation. No pleural effusion or pneumothorax. The  cardiomediastinal silhouette and pulmonary vascularity are within normal  limits. The osseous structures and surrounding soft tissues demonstrate  no acute abnormality.       Impression:      1. No radiographic evidence of acute cardiopulmonary process.        This report was finalized on 06/14/2022 16:32 by Dr Nura Hu, .          I have reviewed the patient's current medications.     Assessment/Plan     Active Hospital Problems    Diagnosis    • **GANESH (acute kidney injury) (HCC)    • Hyperlipidemia    • Heat exposure    • Hyponatremia    • Elevated troponin    • Dehydration    • Type 2 diabetes mellitus with hyperglycemia, with long-term current use of insulin (HCC)    • Alcoholism (HCC)    • Essential hypertension        Plan:  #1 GANESH -on CKD.  Suspect CKD 3 but hard to get a baseline based on his previous labs.  Currently likely in the setting of heat exposure and volume depletion.  Has been improved with IV fluids since arrival, down from 3.77 yesterday to 2.57 today.  GFR of 27.  Continue IV fluid resuscitation.    #2  Hyponatremia -in the setting of volume depletion.  Resolved.  137 today up from 129 with IV fluids.    #3  Elevated troponin -patient had no chest pain or discomfort.  First troponin was up at 0.038 in the setting of mild rhabdo and GANESH.  His repeat troponins have been normal with decreasing CK levels.  Renal function improving.  Initial EKG was also read as a STEMI but reviewed by Dr. Ko and with ER physician on arrival and not felt to represent any signs of ischemia.   And overall picture is not consistent with ACS given flat/improving tropes and rhabdo.  Repeat EKG was normal.  He can have outpatient follow-up with her PCP.  Could potentially benefit from outpatient stress testing when at baseline of 2.    #4  Essential hypertension -BP was soft on arrival.  Starting to improve with fluids.  Lisinopril on hold due to GANESH.  May resume soon.  Monitor.    #5 DM2 -on long-acting insulin at home.  A1c is 10.  Only getting Levemir 15 units here last night and sugars with these to control.  Continue sliding scale insulin and hypoglycemia protocol.    #6 hyperlipidemia -resume statin at discharge.  Holding due to mild rhabdo on arrival.  LFTs are okay.    #7 EtOH use/abuse -no signs of withdrawal.  Mildly elevated lipase on arrival which is flat today.  He has no abdominal pain.  He is hungry.  We will increase clinical diet to cardiac/carbohydrate diet.  Monitor.    Discharge Planning: Continue current care.  Hopefully home tomorrow if doing well and no adverse events.    Electronically signed by Delvin Robin DO, 06/15/22, 08:21 CDT.

## 2022-06-15 NOTE — PAYOR COMM NOTE
"6/15/22  ATTENTION::: PASSPORT BY ALEJANDRO:     INPATIENT SERVICES PRE-AUTHORIZATION.    ATTN:::: PRE-CERTIFICATION TEAM.      ER ADMIT TO INPATIENT ON 6/14/22. FAXING FOR INPATIENT REVIEW.    [place] [id number]  PHONE [phone]    NPI: [id number]    TAX ID: [id number]    PROVIDER:::: DR. DELVIN ROBIN  [address] [place] [address]  NPI   [id number]  PHONE [phone].    ICD 10 CODE:::  N17.9    T67.9XXA        [facility] [phone]    [phone]      ER ADMIT TO INPATIENT ON 6/14/22. FAXING FOR INPATIENT REVIEW.    ATTENTION::: INPATIENT SERVICES PRE-AUTHORIZATION.            Amadeo Gleason (62 y.o. Male)             Date of Birth   1960    Social Security Number       Address   [address] [id number]    Home Phone   [phone]    MRN   [id number]       [employer]olic   [employer]    Marital Status                               Admission Date   6/14/22    Admission Type   Emergency    Admitting Provider   Delvin Robin DO    Attending Provider   Delvin Robin DO    Department, Room/Bed   [facility] 4B, 402/1       Discharge Date       Discharge Disposition       Discharge Destination                               Attending Provider: Delvin Robin DO    Allergies: No Known Allergies    Isolation: None   Infection: None   Code Status: CPR   Advance Care Planning Activity    Ht: 167.6 cm (66\")   Wt: 81.6 kg (179 lb 12.8 oz)    Admission Cmt: None   Principal Problem: GANESH (acute kidney injury) (HCC) [N17.9]                 Active Insurance as of 6/14/2022     Primary Coverage     Payor Plan Insurance Group Employer/Plan Group    Formerly named [facility] BY ALEJANDRO PASSPORT BY ALEJANDRO [id number]     Payor Plan Address Payor Plan Phone Number Payor Plan Fax Number Effective Dates    [address]   1/1/2021 - None Entered    [place] [address]       Subscriber Name Subscriber Birth Date Member ID       AMADEO GLEASON 1960 " [id number]                 Emergency Contacts      (Rel.) Home Phone Work Phone Mobile Phone    Tessy Mercado (Spouse) [phone] -- [phone]    LAURA MERCADO (Spouse) -- -- [phone]                                  [facility] Encounter Date/Time: 2022 [address] [place] [address] Account: [id number]    MRN: [id number]   Patient:  Amadeo Alexander   Contact Serial #: [id number]   SSN:          ENCOUNTER             Patient Class: Inpatient   Unit: [address] Service: Medicine     Bed: 402/1   Admitting Provider: Delvin Robin DO   Referring Physician:     Attending Provider: Delvin Robin DO   Adm Diagnosis: Heat exposure, initial e*               PATIENT          Name: Amadeo Alexander : 1960 (62 yrs)   Address: [address] [place] [address] Sex: Male   City: [place] [address]   County: [email or web address]   Marital Status:  Ethnicity:                                                                              Race: WHITE   Primary Care Provider: Provider, No Known Patients Phone: Home Phone: [phone]     Mobile Phone: [phone]   EMERGENCY CONTACT   Contact Name Legal Guardian? Relationship to Patient Home Phone Work Phone   1. MercadoTessy  2. LAURA MERCADO    No Spouse  Spouse [phone]              GUARANTOR            Guarantor: Amadeo Alexander     : 1960   Address: [address] [place] [address] Sex: [place]     [place] [address]     Relation to Patient: Self       Home Phone: [phone]   Guarantor ID: [id number]       Work Phone:     GUARANTOR EMPLOYER   Employer: [employer]         Status: FULL TIME   COVERAGE          PRIMARY INSURANCE   Payor: [employer] BY ALEJANDRO Plan: "[employer] BY ALEJANDRO   Group Number: [id number] Insurance Type: INDEMNITY   Subscriber Name: AMADEO GLEASON Subscriber : 1960   Subscriber ID: [id number] Coverage Address: [facility] [address] [place] [address]. Rel. to Subscriber: Self Coverage  Phone:     SECONDARY INSURANCE   Payor: N/A Plan: N/A   Group Number:   Insurance Type:     Subscriber Name:   Subscriber :     Subscriber ID:   Coverage Address:     [facility]. [facility]. to Subscriber:   Coverage Phone:        Contact Serial # ([id number])         Anel 15, 2022    Chart ID ([id number])                  Kofi Goodwin MD    Physician   Specialty:  Emergency Medicine   ED Provider Notes       Addendum   Date of Service:  22   Creation Time:  22              Expand AllCollapse All          Show:Clear all  [x]Manual[x]Template[]Copied    Added by:  [x]Kofi Goodwin MD      []Hover for details         Subjective      Patient is 62 years old who came the ER because he feels dehydrated he had drank some alcohol recently and has been working outside in the sun his blood pressure was low denies any chest pain denies any shortness of breath denies any fever.  He is EKG showed nonspecific ST-T wave changes is some early repolarization in the inferior leads which has been seen in the prior EKGs also.        Hypotension  Location:  Blood pressure dehydration  Severity:  Moderate  Onset quality:  Gradual  Timing:  Constant  Progression:  Worsening  Chronicity:  New  Associated symptoms: nausea    Associated symptoms: no abdominal pain, no chest pain, no congestion, no cough, no diarrhea, no ear pain, no fatigue, no fever, no headaches, no myalgias, no rash, no rhinorrhea, no shortness of breath, no vomiting and no wheezing              Review of Systems   Constitutional: Negative.  Negative for fatigue and fever.   HENT: Negative.  Negative for congestion, ear pain and rhinorrhea.    Respiratory: Negative for cough, shortness of breath and wheezing.    Cardiovascular: Negative for chest pain.   Gastrointestinal: Positive for nausea. Negative for abdominal distention, abdominal pain, diarrhea and vomiting.   Endocrine: Negative.    Genitourinary: Negative.    Musculoskeletal:  Negative.  Negative for back pain, myalgias and neck pain.   Skin: Negative for color change, pallor and rash.   Neurological: Negative.  Negative for syncope, weakness, light-headedness, numbness and headaches.   Hematological: Negative.  Does not bruise/bleed easily.   All other systems reviewed and are negative.        Past Medical History:   Diagnosis Date   • DM (diabetes mellitus) (CMS/HCC)     • ED (erectile dysfunction)     • ETOH abuse     • GERD (gastroesophageal reflux disease)     • Gout     • H. pylori infection     • Hepatic steatosis     • History of pancreatitis 1/6/2017     secondary to etoh.    • Hyperlipidemia     • Hypertension     • Mixed hyperlipidemia     • Pancreatitis, alcoholic, acute           , 2022 1622 Discussed this case with  he reviewed the EKGs will consult on the patient I think his Trope elevation is type II elevation because of renal failure.  He denies any chest pain at this time.  The anion gap is because of metabolic acidosis I do not believe that he is in DKA is not vomiting not tachypneic [TS]   1638 Patient has got acute on chronic renal insufficiency with slightly with CK was given IV fluids.  Is going be admitted to the hospitalist service. [TS]       ED Course User Index  [TS] Kofi Goodwin MD                                           MDM  Number of Diagnoses or Management Options  Diagnosis management comments: Maintaining dehydration alcohol use working outside        Amount and/or Complexity of Data Reviewed  Clinical lab tests: ordered and reviewed  Tests in the radiology section of CPT®: ordered and reviewed  Tests in the medicine section of CPT®: reviewed and ordered     Risk of Complications, Morbidity, and/or Mortality  Presenting problems: moderate  Diagnostic procedures: moderate  Management options: moderate         Final diagnoses:   Heat exposure, initial encounter   Acute renal failure superimposed on chronic kidney disease, unspecified CKD  stage, unspecified acute renal failure type (HCC)   Troponin I above reference range   High anion gap metabolic acidosis         ED Disposition         ED Disposition             ED Disposition   Decision to Admit    Condition   --    Comment   Level of Care: Telemetry [5]   Diagnosis: Heat exposure, initial encounter [[id number]]   Admitting Physician: ALLIE MCDERMOTT [[id number]]   Attending Physician: ALLIE MCDERMOTT [[id number]]   Certification: I Certify That Inpatient Hospital Services Are Medically Necessary For Greater Than 2 Midnights                    No follow-up provider specified.          Lupe Coker APRN    Nurse Practitioner   Hospitalist   H&P       Attested   Date of Service:  06/14/22 1651   Creation Time:  06/14/22 1651              Attested            Attestation signed by Delvin Robin DO at 06/14/22 6779     This visit was performed by both a physician and an APC. I personally evaluated and examined the patient. I performed all aspects of the MDM as documented.       Patient seen and evaluated in the ER 3.  Feeling better at this time.  Presented to the hospital due to low blood pressure and some muscle cramps after 2 days of working out in the heat with exposure.  On top of the fact that it is incredibly warm outside he also cut steel with a blow torch.  On arrival he was found to be in acute on chronic renal failure with mild rhabdomyolysis.  He also had a mildly elevated troponin at 0.038.  This is likely in the setting of his rhabdo and renal injury.  He has no active chest pain.  He states he did have some back pain which on exam I can reproduce in the paraspinal muscles around T10-12 with palpation but he has no other chest pain or pain at this time.  Without palpating his back he is pain-free.  Repeat troponin is already normal 0.029.  Patient does have a history of alcohol use and states he had been drinking for 3 to 4 days last week after a party his daughter had.  A  "lipase was added onto ER labs and is mildly elevated at 213.  Patient is not significantly tender on his abdominal exam at this time.  We will allow clear liquid diet overnight with IV fluid resuscitation for his renal failure, rhabdo, possible mild pancreatitis.  Do not feel need to image his abdomen at this time as he is afebrile without a white count again is overall abdominal exam is fairly benign.  Recheck lipase, CK, renal function in the morning.  Suspect his sodium will also improve with hydration.       Electronically signed by   Delvin Robin DO    ,   6/14/2022    ,   18:24 CDT  .   Chief Complaint: Hypotension, neck pain and heat exposure     History of Present Illness  Aamdeo Alexander is a 62-year-old male with a past medical history of insulin-dependent diabetes, intermittent alcohol abuse most recent use last Saturday after a 3-day binge with beer and tequila-patient unable to quantify reports \"lots\".  Other health history hypertension, hyperlipidemia chronic kidney disease- stage IIIa.  Most recent comparison 6/3 GFR 53.4 today 17.5.  Patient works outside in the steel mill.  He did work yesterday and today.  He states he overheated, has done this in the past.  He did have associated leg cramps last evening and hand cramps today, neck pain currently scored 8 on a scale of 1-10, nausea without vomiting that is persistent.  He is also reporting bilateral posterior lung pain located mid back.  He also reports mid epigastric tenderness is felt to be secondary to recent alcohol binge.  He reports symptoms of withdrawal on Sunday however he has been at its baseline Monday and Tuesday.  He has no complaints of shortness of breathing or chest pain. He drove himself to the emergency department for evaluation.  He was noted to have elevated troponin and abnormal EKG, cardiologist were notified and did review EKG.  Creatinine 3.77-baseline 1.5, troponin 0.038, glucose 265, sodium 129, .  Patient is " "admitted for further evaluation treatment.     Review of Systems   A 10 point review of systems was completed, all negative except for those discussed in HPI        Past Medical History:   Diagnosis Date   • DM (diabetes mellitus) (HCC)     • ED (erectile dysfunction)     • ETOH abuse     • GERD (gastroesophageal reflux disease)     • Gout     • H. pylori infection     • Hepatic steatosis     • History of pancreatitis 1/6/2017     secondary to etoh.    • Hyperlipidemia     • Hypertension     • Mixed hyperlipidemia     • Pancreatitis, alcoholic, acute           Location: Right arm, Patient Position: Sitting)   Pulse 89   Temp 98.8 °F (37.1 °C)   Resp 15   Ht 165.1 cm (65\")   Wt 82.6 kg (182 lb)   SpO2 97%   BMI 30.29 kg/m²   Physical Exam  Vitals reviewed.   Constitutional:       Appearance: He is ill-appearing.   HENT:      Head: Normocephalic and atraumatic.      Mouth/Throat:      Mouth: Mucous membranes are dry.      Pharynx: Oropharynx is clear.   Eyes:      Extraocular Movements: Extraocular movements intact.      Conjunctiva/sclera:      Right eye: Right conjunctiva is injected.      Left eye: Left conjunctiva is injected.   Cardiovascular:      Rate and Rhythm: Normal rate and regular rhythm.   Pulmonary:      Effort: Pulmonary effort is normal.      Breath sounds: Normal breath sounds.   Abdominal:      General: There is no distension.      Palpations: Abdomen is soft.      Tenderness: There is abdominal tenderness ( Mid epigastric region).   Musculoskeletal:         General: Normal range of motion.      Cervical back: Normal range of motion and neck supple.      Right lower leg: No edema.      Left lower leg: No edema.   Skin:     General: Skin is warm and dry.   Neurological:      General: No focal deficit present.      Mental Status: He is alert and oriented to person, place, and time.   Psychiatric:         Mood and Affect: Mood normal.         Behavior: Behavior normal.           Procedure " Component Value Units Date/Time     Troponin [[id number]] Collected: 06/14/22 1710     Specimen: Blood from Arm, Left Updated: 06/14/22 1717     CK [[id number]]  (Abnormal) Collected: 06/14/22 1503     Specimen: Blood Updated: 06/14/22 1630       Creatine Kinase 255 U/L       CBC Auto Differential [[id number]]  (Abnormal) Collected: 06/14/22 1546     Specimen: Blood Updated: 06/14/22 1602       WBC 8.66 10*3/mm3         RBC 4.02 10*6/mm3         Hemoglobin 12.6 g/dL         Hematocrit 37.0 %         MCV 92.0 fL         MCH 31.3 pg         MCHC 34.1 g/dL         RDW 12.8 %         RDW-SD 42.5 fl         MPV 10.2 fL         Platelets 214 10*3/mm3         Neutrophil % 67.2 %         Lymphocyte % 18.2 %         Monocyte % 10.3 %         Eosinophil % 3.1 %         Basophil % 0.7 %         Immature Grans % 0.5 %         Neutrophils, Absolute 5.82 10*3/mm3         Lymphocytes, Absolute 1.58 10*3/mm3         Monocytes, Absolute 0.89 10*3/mm3         Eosinophils, Absolute 0.27 10*3/mm3         Basophils, Absolute 0.06 10*3/mm3         Immature Grans, Absolute 0.04 10*3/mm3         nRBC 0.0 /100 WBC       Troponin [[id number]]  (Abnormal) Collected: 06/14/22 1503     Specimen: Blood Updated: 06/14/22 1547       Troponin T 0.038 ng/mL       Comprehensive Metabolic Panel [[id number]]  (Abnormal) Collected: 06/14/22 1503     Specimen: Blood Updated: 06/14/22 1536       Glucose 265 mg/dL         BUN 43 mg/dL         Creatinine 3.77 mg/dL         Sodium 129 mmol/L         Potassium 4.9 mmol/L         Comment: Slight hemolysis detected by analyzer. Results may be affected.          Chloride 88 mmol/L         CO2 21.0 mmol/L         Calcium 9.2 mg/dL         Total Protein 7.4 g/dL         Albumin 4.60 g/dL         ALT (SGPT) 28 U/L         AST (SGOT) 28 U/L         Alkaline Phosphatase 86 U/L         Total Bilirubin 0.4 mg/dL         Globulin 2.8 gm/dL         A/G Ratio 1.6 g/dL         BUN/Creatinine Ratio 11.4       Anion Gap 20.0  mmol/L         eGFR 17.5 mL/min/1.73       Magnesium [[id number]]  (Abnormal) Collected: 06/14/22 1503     Specimen: Blood Updated: 06/14/22 1530       Magnesium 2.5 mg/dL       CBC Auto Differential [[id number]]  (Abnormal) Collected: 06/14/22 1503     Specimen: Blood Updated: 06/14/22 1516       WBC 9.97 10*3/mm3         RBC 4.13 10*6/mm3         Hemoglobin 12.9 g/dL         Hematocrit 38.6 %         MCV 93.5 fL         MCH 31.2 pg         MCHC 33.4 g/dL         RDW 12.7 %         RDW-SD 43.5 fl         MPV 10.2 fL         Platelets 218 10*3/mm3         Neutrophil % 68.1 %         Lymphocyte % 18.3 %         Monocyte % 9.8 %         Eosinophil % 2.9 %         Basophil % 0.5 %         Immature Grans % 0.4 %         Neutrophils, Absolute 6.79 10*3/mm3         Lymphocytes, Absolute 1.82 10*3/mm3         Monocytes, Absolute 0.98 10*3/mm3         Eosinophils, Absolute 0.29 10*3/mm3         Basophils, Absolute 0.05 10*3/mm3         Immature Grans, Absolute 0.04 10*3/mm3         nRBC 0.0 /100 WBC                  I have personally reviewed and interpreted the radiology studies and ECG obtained at time of admission.      Assessment / Plan      Assessment:        Active Hospital Problems     Diagnosis     • Heat exposure              Assessment:        Active Hospital Problems     Diagnosis     • Heat exposure     • Hypotension     • Hyponatremia     • Elevated troponin     • GANESH (acute kidney injury) (HCC)     • Dehydration     • Type 2 diabetes mellitus with hyperglycemia, with long-term current use of insulin ([facility])     • Alcoholism ([facility])           Plan:   1.  Admit as inpatient  2.  Continue hydration normal saline 125 mL/hour  3.  Home medications reviewed and restarted as appropriate  4.  Heparin 1000 units subcu every 12 hours  5.  Monitor glucose before meals and at bedtime with regular insulin sliding scale coverage, continue long-acting 15 units at bedtime  6.  Add Protonix 40 mg by mouth daily, include now  7.   Supplemental oxygen as needed, continuous pulse oximetry, incentive spirometry  8.  Consult cardiology  9.  Serial troponins and EKGs as scheduled and as needed, cardiac monitoring        I discussed the patient's findings and my recommendations with: Delvin Robin DO  Time spent 40 minutes     Patient seen and examined by me on 6/14/2022 at 4:51 PM.     Electronically signed by PHONG Ma, 06/14/22, 17:37 CDT.            Cosigned by: Delvin Robin DO at 06/14/22 1829                                 Current Facility-Administered Medications   Medication Dose Route Frequency Provider Last Rate Last Admin   • acetaminophen (TYLENOL) tablet 650 mg  650 mg Oral Q4H PRN Lupe Coker APRN   650 mg at 06/15/22 0739    Or   • acetaminophen (TYLENOL) 160 MG/5ML solution 650 mg  650 mg Oral Q4H PRN Lupe Coker APRN        Or   • acetaminophen (TYLENOL) suppository 650 mg  650 mg Rectal Q4H PRN Lupe Coker APRN       • allopurinol (ZYLOPRIM) tablet 100 mg  100 mg Oral Daily Lupe Coker APRN   100 mg at 06/15/22 0906   • dextrose (D50W) (25 g/50 mL) IV injection 25 g  25 g Intravenous Q15 Min PRN Lupe Coker APRN       • dextrose (GLUTOSE) oral gel 15 g  15 g Oral Q15 Min PRN Lupe Coker APRN       • glucagon (human recombinant) (GLUCAGEN DIAGNOSTIC) injection 1 mg  1 mg Intramuscular Q15 Min PRN Lupe Coker APRN       • heparin (porcine) 5000 UNIT/ML injection 5,000 Units  5,000 Units Subcutaneous Q12H Lupe Coker APRN   5,000 Units at 06/15/22 0900   • insulin detemir (LEVEMIR) injection 15 Units  15 Units Subcutaneous Nightly Lupe Coker APRN   15 Units at 06/14/22 2313   • Insulin Lispro (humaLOG) injection 0-9 Units  0-9 Units Subcutaneous 4x Daily With Meals & Nightly Lupe Coker APRN       • ondansetron (ZOFRAN) tablet 4 mg  4 mg Oral Q6H PRN Lupe Coker APRN        Or   • ondansetron (ZOFRAN) injection 4 mg  4 mg Intravenous  Q6H PRN Lupe Coker APRN       • pantoprazole (PROTONIX) EC tablet 40 mg  40 mg Oral Q AM Lupe Coker APRN   40 mg at 06/15/22 0642   • sodium chloride 0.9 % flush 10 mL  10 mL Intravenous PRN Kofi Goodwin MD       • sodium chloride 0.9 % flush 10 mL  10 mL Intravenous PRN Kofi Goodwin MD       • sodium chloride 0.9 % flush 10 mL  10 mL Intravenous Q12H Lupe Coker APRN       • sodium chloride 0.9 % flush 10 mL  10 mL Intravenous PRN Lupe Coker APRN       • sodium chloride 0.9 % infusion  100 mL/hr Intravenous Continuous Delvin Robin  mL/hr at 06/15/22 1022 100 mL/hr at 06/15/22 1022

## 2022-06-15 NOTE — CASE MANAGEMENT/SOCIAL WORK
Discharge Planning Assessment  [facility]     Patient Name: Amadeo Alexander  MRN: [id number]  Today's Date: 6/15/2022    Admit Date: 6/14/2022     Discharge Needs Assessment     Row Name 06/15/22 0901       Living Environment    People in Home spouse    Name(s) of People in Home Tessy    Current Living Arrangements home    Primary Care Provided by self    Provides Primary Care For no one    Family Caregiver if Needed child(gregg), adult;spouse    Quality of Family Relationships helpful;involved;supportive    Able to Return to Prior Arrangements yes       Resource/Environmental Concerns    Resource/Environmental Concerns none       Transition Planning    Patient/Family Anticipates Transition to home    Patient/Family Anticipated Services at Transition none    Transportation Anticipated family or friend will provide       Discharge Needs Assessment    Readmission Within the Last 30 Days no previous admission in last 30 days    Equipment Currently Used at Home none    Concerns to be Addressed substance/tobacco abuse/use    Anticipated Changes Related to Illness none    Equipment Needed After Discharge none    Current Discharge Risk substance use/abuse    Discharge Coordination/Progress Pt resides with spouse.  Pt has PCP ([facility]), RX coverage and can afford medications.  [facility] gave chemical dependency packet to pt.  He states he has been to rehab for 7 months in the past.  At this time, he does not wish to return.  [facility] gave him a substance abuse packet to take home.  Probable dc tomorrow.  SW will continue to follow.               Discharge Plan    No documentation.               Continued Care and Services - Admitted Since 6/14/2022    Coordination has not been started for this encounter.          Demographic Summary    No documentation.                Functional Status    No documentation.                Psychosocial    No documentation.                Abuse/Neglect    No documentation.                Legal     No documentation.                Substance Abuse    No documentation.                Patient Forms    No documentation.                   SEBASTIÁN [place]

## 2022-06-16 ENCOUNTER — READMISSION MANAGEMENT (OUTPATIENT)
Dept: CALL CENTER | Facility: HOSPITAL | Age: 62
End: 2022-06-16

## 2022-06-16 VITALS
HEART RATE: 76 BPM | DIASTOLIC BLOOD PRESSURE: 70 MMHG | TEMPERATURE: 97 F | HEIGHT: 66 IN | RESPIRATION RATE: 16 BRPM | OXYGEN SATURATION: 95 % | BODY MASS INDEX: 29.8 KG/M2 | WEIGHT: 185.4 LBS | SYSTOLIC BLOOD PRESSURE: 170 MMHG

## 2022-06-16 LAB
ANION GAP SERPL CALCULATED.3IONS-SCNC: 6 MMOL/L (ref 5–15)
BUN SERPL-MCNC: 28 MG/DL (ref 8–23)
BUN/CREAT SERPL: 23.1 (ref 7–25)
CALCIUM SPEC-SCNC: 8.6 MG/DL (ref 8.6–10.5)
CHLORIDE SERPL-SCNC: 107 MMOL/L (ref 98–107)
CO2 SERPL-SCNC: 27 MMOL/L (ref 22–29)
CREAT SERPL-MCNC: 1.21 MG/DL (ref 0.76–1.27)
EGFRCR SERPLBLD CKD-EPI 2021: 68.5 ML/MIN/1.73
GLUCOSE BLDC GLUCOMTR-MCNC: 99 MG/DL (ref 70–130)
GLUCOSE SERPL-MCNC: 128 MG/DL (ref 65–99)
LIPASE SERPL-CCNC: 191 U/L (ref 13–60)
POTASSIUM SERPL-SCNC: 4.4 MMOL/L (ref 3.5–5.2)
SODIUM SERPL-SCNC: 140 MMOL/L (ref 136–145)

## 2022-06-16 PROCEDURE — 25010000002 ONDANSETRON PER 1 MG: Performed by: NURSE PRACTITIONER

## 2022-06-16 PROCEDURE — 25010000002 HEPARIN (PORCINE) PER 1000 UNITS: Performed by: NURSE PRACTITIONER

## 2022-06-16 PROCEDURE — 82962 GLUCOSE BLOOD TEST: CPT

## 2022-06-16 PROCEDURE — 80048 BASIC METABOLIC PNL TOTAL CA: CPT | Performed by: INTERNAL MEDICINE

## 2022-06-16 PROCEDURE — 83690 ASSAY OF LIPASE: CPT | Performed by: INTERNAL MEDICINE

## 2022-06-16 RX ORDER — LISINOPRIL 20 MG/1
40 TABLET ORAL DAILY
Status: DISCONTINUED | OUTPATIENT
Start: 2022-06-16 | End: 2022-06-16 | Stop reason: HOSPADM

## 2022-06-16 RX ORDER — LISINOPRIL 40 MG/1
40 TABLET ORAL DAILY
Start: 2022-06-16

## 2022-06-16 RX ADMIN — SODIUM CHLORIDE 100 ML/HR: 9 INJECTION, SOLUTION INTRAVENOUS at 03:39

## 2022-06-16 RX ADMIN — ACETAMINOPHEN 650 MG: 325 TABLET ORAL at 08:09

## 2022-06-16 RX ADMIN — HEPARIN SODIUM 5000 UNITS: 5000 INJECTION INTRAVENOUS; SUBCUTANEOUS at 08:03

## 2022-06-16 RX ADMIN — ALLOPURINOL 100 MG: 100 TABLET ORAL at 08:03

## 2022-06-16 RX ADMIN — ONDANSETRON 4 MG: 2 INJECTION INTRAMUSCULAR; INTRAVENOUS at 03:41

## 2022-06-16 RX ADMIN — PANTOPRAZOLE SODIUM 40 MG: 40 TABLET, DELAYED RELEASE ORAL at 08:09

## 2022-06-16 NOTE — DISCHARGE SUMMARY
[facility] Medicine Services  DISCHARGE SUMMARY       Date of Admission: 6/14/2022  Date of Discharge:  6/16/2022  Primary Care Physician: Provider, No Known    Presenting Problem/Chief Complaint:  Hypotension/heat exposure    Final Discharge Diagnoses:  Active Hospital Problems    Diagnosis    • **GANESH (acute kidney injury) (HCC)    • Hyperlipidemia    • Heat exposure    • Hyponatremia    • Elevated troponin    • Dehydration    • Type 2 diabetes mellitus with hyperglycemia, with long-term current use of insulin (HCC)    • Alcoholism (HCC)    • Essential hypertension        Consults:   none    Procedures Performed: none    Pertinent Test Results:     Imaging Results (All)     Procedure Component Value Units Date/Time    XR Chest 1 View [[id number]] Collected: 06/14/22 1632     Updated: 06/14/22 1635    Narrative:      XR CHEST 1 VW- 6/14/2022 4:08 PM CDT     HISTORY: Chest Pain protocol     COMPARISON: Chest exam dated 7/12/2020.     FINDINGS:      No lung consolidation. No pleural effusion or pneumothorax. The  cardiomediastinal silhouette and pulmonary vascularity are within normal  limits. The osseous structures and surrounding soft tissues demonstrate  no acute abnormality.       Impression:      1. No radiographic evidence of acute cardiopulmonary process.        This report was finalized on 06/14/2022 16:32 by Dr Nura Hu, .          LAB RESULTS:      Lab 06/15/22  0029 06/14/22  1546 06/14/22  1503   WBC 8.38 8.66 9.97   HEMOGLOBIN 12.2* 12.6* 12.9*   HEMATOCRIT 36.9* 37.0* 38.6   PLATELETS 198 214 218   NEUTROS ABS  --  5.82 6.79   IMMATURE GRANS (ABS)  --  0.04 0.04   LYMPHS ABS  --  1.58 1.82   MONOS ABS  --  0.89 0.98*   EOS ABS  --  0.27 0.29   MCV 94.4 92.0 93.5         Lab 06/16/22  0543 06/15/22  0029 06/14/22  1503   SODIUM 140 137 129*   POTASSIUM 4.4 4.0 4.9   CHLORIDE 107 98 88*   CO2 27.0 27.0 21.0*   ANION GAP 6.0 12.0 20.0*   BUN 28* 42* 43*   CREATININE  Candyce Kanner  Admission Date: 12/2/2020             Renal Daily Progress Note: 12/3/2020    The patient's chart is reviewed and the patient is discussed with the staff.   Follow up ESRD  Subjective:   Patient seen and examined on HD, dialyzing via RUE  Qb, UF 3600 tolerating UF, + SOB  Labs and chart reviewed    ROS:  General: no fever/chills, appetite ok  CV: no CP  Lung: + SOB, no cough  GI: no N/V/D  Ext: + edema     Current Facility-Administered Medications   Medication Dose Route Frequency    calcium acetate (phosphate binder) (PHOSLO) tablet 1,334 mg  1,334 mg Oral TID WITH MEALS    clopidogreL (PLAVIX) tablet 75 mg  75 mg Oral DAILY    cyanocobalamin tablet 1,000 mcg  1,000 mcg Oral DAILY    docusate sodium (COLACE) capsule 100 mg  100 mg Oral DAILY    B complex-vitamin C-folic acid (NEPHRO-WAQAR) 0.8 mg tab  1 Tab Oral DAILY    gabapentin (NEURONTIN) capsule 200 mg  200 mg Oral TID    insulin glargine (LANTUS) injection 25 Units  25 Units SubCUTAneous QHS PRN    levothyroxine (SYNTHROID) tablet 200 mcg  200 mcg Oral ACB    pantoprazole (PROTONIX) tablet 40 mg  40 mg Oral ACB    rosuvastatin (CRESTOR) tablet 20 mg  20 mg Oral QHS    allopurinoL (ZYLOPRIM) tablet 100 mg  100 mg Oral DAILY    sodium chloride (NS) flush 5-40 mL  5-40 mL IntraVENous Q8H    sodium chloride (NS) flush 5-40 mL  5-40 mL IntraVENous PRN    acetaminophen (TYLENOL) tablet 650 mg  650 mg Oral Q6H PRN    Or    acetaminophen (TYLENOL) suppository 650 mg  650 mg Rectal Q6H PRN    polyethylene glycol (MIRALAX) packet 17 g  17 g Oral DAILY PRN    heparin (porcine) injection 5,000 Units  5,000 Units SubCUTAneous Q8H    dextrose 40% (GLUTOSE) oral gel 1 Tube  15 g Oral PRN    glucagon (GLUCAGEN) injection 1 mg  1 mg IntraMUSCular PRN    dextrose (D50W) injection syrg 12.5-25 g  25-50 mL IntraVENous PRN         Objective:     Vitals:    12/03/20 0609 12/03/20 0719 12/03/20 0946 12/03/20 1000 1.21 2.57* 3.77*   EGFR 68.5 27.8* 17.5*   GLUCOSE 128* 210* 265*   CALCIUM 8.6 8.7 9.2   MAGNESIUM  --   --  2.5*   HEMOGLOBIN A1C  --  10.60*  --          Lab 06/16/22  0543 06/15/22  0029 06/14/22  1710 06/14/22  1503   TOTAL PROTEIN  --  6.3  --  7.4   ALBUMIN  --  3.90  --  4.60   GLOBULIN  --  2.4  --  2.8   ALT (SGPT)  --  23  --  28   AST (SGOT)  --  22  --  28   BILIRUBIN  --  0.3  --  0.4   ALK PHOS  --  71  --  86   LIPASE 191* 212* 213*  --          Lab 06/15/22  0029 06/14/22  1710 06/14/22  1503   TROPONIN T 0.025 0.029 0.038*         Lab 06/15/22  0029   CHOLESTEROL 206*   LDL CHOL 101*   HDL CHOL 42   TRIGLYCERIDES 372*             Brief Urine Lab Results  (Last result in the past 365 days)      Color   Clarity   Blood   Leuk Est   Nitrite   Protein   CREAT   Urine HCG        07/27/21 2237 Yellow   Clear   Negative   Negative   Negative   30 mg/dL (1+)               Microbiology Results (last 10 days)     Procedure Component Value - Date/Time    COVID PRE-OP / PRE-PROCEDURE SCREENING ORDER (NO ISOLATION) - Swab, Nasal Cavity [[id number]]  (Normal) Collected: 06/14/22 2002    Lab Status: Final result Specimen: Swab from Nasal Cavity Updated: 06/14/22 2046    Narrative:      The following orders were created for panel order COVID PRE-OP / PRE-PROCEDURE SCREENING ORDER (NO ISOLATION) - Swab, Nasal Cavity.  Procedure                               Abnormality         Status                     ---------                               -----------         ------                     COVID-19,[facility]...[[id number]]  Normal              Final result                 Please view results for these tests on the individual orders.    COVID-19,[facility] IN-HOUSE,Nasal Swab No Transport Media 3-4 HR TAT - Swab, Nasal Cavity [[id number]]  (Normal) Collected: 06/14/22 2002    Lab Status: Final result Specimen: Swab from Nasal Cavity Updated: 06/14/22 2046     COVID19 Not Detected    Narrative:      Fact sheet for  BP:  (!) 147/75 (!) 154/73 (!) 167/86   Pulse:  74 74 78   Resp:  18     Temp:  97.6 °F (36.4 °C)     SpO2:  97%     Weight: 94 kg (207 lb 3.2 oz)      Height:         Intake and Output:   12/01 1901 - 12/03 0700  In: -   Out: 2000   No intake/output data recorded. Physical Exam:   Constitutional:  the patient is well developed and in no acute distress, alert and oriented   HEENT:  Sclera clear, pupils equal, oral mucosa moist  Lungs: clear with low lung volumes bilaterally   Cardiovascular:  Regular rate, S1, S2, no Rub   Abd/GI: soft and non-tender; with positive bowel sounds. Ext: warm without cyanosis. There is +1 lower leg edema. Skin:  no jaundice or rashes   Neuro: no gross neuro deficits   Psychiatric: Calm. Access: RUE AVG cannulated       LAB  Recent Labs     12/03/20  0615 12/02/20  1008   WBC 7.4 11.5*   HGB 9.0* 9.7*   HCT 30.1* 31.9*   * 148*     Recent Labs     12/03/20  0615 12/02/20  1153 12/02/20  1008   *  --  135*   K 4.8 6.1* 6.1*     --  99   CO2 26  --  23   GLU 65  --  102*   BUN 52*  --  92*   CREA 8.81*  --  13.00*   MG 2.1  --  2.4   PHOS  --   --  6.7*   ALB 3.1*  --  3.1*     No results for input(s): PH, PCO2, PO2, HCO3 in the last 72 hours.       Assessment:  (Medical Decision Making)     Hospital Problems  Date Reviewed: 9/24/2020          Codes Class Noted POA    * (Principal) Fluid overload ICD-10-CM: E87.70  ICD-9-CM: 276.69  12/2/2020 Unknown        Chronic respiratory failure with hypoxia and hypercapnia (HCC) ICD-10-CM: J96.11, J96.12  ICD-9-CM: 518.83, 799.02, 786.09  2/27/2018 Yes        ESRD (end stage renal disease) on dialysis [facility]) ICD-10-CM: N18.6, Z99.2  ICD-9-CM: 585.6, V45.11  7/20/2017 Yes        Chronic diastolic heart failure (HCC) ICD-10-CM: I50.32  ICD-9-CM: 428.32  Unknown Yes        Coronary atherosclerosis of native coronary vessel ICD-10-CM: I25.10  ICD-9-CM: 414.01  Unknown Yes        CASSIE (obstructive sleep apnea) (Chronic) ICD-10-CM: G47.33  ICD-9-CM: 327.23  7/16/2014 Yes    Overview Signed 7/16/2014  1:18 AM by PARESH Kc             [facility] (Chronic) ICD-10-CM: E11.9  ICD-9-CM: 250.00  7/16/2014 Yes        Hypothyroidism ICD-10-CM: E03.9  ICD-9-CM: 244.9  6/25/2014 Yes        Morbid obesity (HCC) (Chronic) ICD-10-CM: E66.01  ICD-9-CM: 278.01  6/19/2014 Yes              Plan:  (Medical Decision Making)   1) ESRD- [facility]  -HD tomorrow for volume and clearance     2) Volume- noted lower extremity edema as well as pulmonary edema.   continue UF with HD, daily weights    3) Hyperkalemia-  improved with HD    4) Hyperphos- on home binders    5) Anemia-  RODRIGO per outpatient clinic    Stan Nichols NP providers: https://www.fda.gov/media/005316/download     Fact sheet for patients: https://www.fda.gov/media/583563/download    Test performed by PCR.    Consider negative results in combination with clinical observations, patient history, and epidemiological information.          Chief Complaint on Day of Discharge:   Follow-up GANESH/mild rhabdo    History of Present Illness on Day of Discharge:   Patient continues to do well.  Blood pressure has improved significantly.  Renal function has improved significantly.  He has no chest pain, shortness of breath, dizziness.  No nausea.  Tolerating p.o.  Afebrile.    Hospital Course:  The patient is a 62 y.o. male with a history of IDDM, hypertension, hyperlipidemia, CKD 3A, alcohol use.  Patient reportedly had been sober for about 7 months but recently had a family party and relapsed.  He drank about 3 days afterwards.  For work he cuts steel plates with blowtorch's.  He resents to the hospital with low blood pressure after several days of working hard outside in the heat doing said job.  In the ER he was found to have GANESH on CKD with a creatinine of 3.77 and a mild CK elevation of 255.  He was also hyponatremic volume depletional 129.  He has small troponin 0.038 but this was considered elevated in the setting of his rhabdo and renal failure.  Repeat troponins were actually normal.  His EKG on arrival had an auto read from the machine as STEMI but the ER provider ran this by Dr. Ko of cardiology who did not feel it was an ischemic EKG.  Patient was not having any chest pain.  Repeat EKG shortly after was normal.  Patient did well here with IV fluid resuscitation over 48 hours.  His sodium normalized.  His muscle markers resolved.  His renal function improved creatinine only down to 1.2 prior to discharge.  His blood pressure med was restarted.  He is tolerating p.o.  This time okay to discharge home with outpatient follow-up.  Of note only getting 15 units of Levemir  "here and his sugars have been doing very well in the low 100s in the a.m.  Also note Tegretol being discontinued at discharge.  Patient denies a history of seizures and says he takes this for neuropathy but also states he does not take it every day.  Feel at this time it will be pertinent to keep him off this until he demonstrates ability to stay off alcohol.  Patient states he is going to be sober going forward as it was prior.  He will continue to follow-up with alcohol center to help with his alcoholism.  He seems to have good insight and desire to stay sober.  Okay to discharge home today.    Condition on Discharge: Stable/improved    Physical Exam on Discharge:  /70 (BP Location: Left arm, Patient Position: Lying)   Pulse 76   Temp 97 °F (36.1 °C) (Oral)   Resp 16   Ht 167.6 cm (66\")   Wt 84.1 kg (185 lb 6.4 oz)   SpO2 95%   BMI 29.92 kg/m²   Physical Exam  GEN: Awake, alert, interactive, in NAD  HEENT:  PERRLA, EOMI, Anicteric, Trachea midline  Lungs:  no wheezing/rales/rhonchi  Heart: RRR, +S1/s2, no rub  ABD: soft, nt/nd, +BS, no guarding/rebound  Extremities: atraumatic, no cyanosis, no edema  Skin: no rashes or lesions  Neuro: AAOx3, no focal deficits  Psych: normal mood & affect    Discharge Disposition:  Home    Discharge Medications:     Discharge Medications      Changes to Medications      Instructions Start Date   Insulin Glargine 100 UNIT/ML injection pen  Commonly known as: LANTUS SOLOSTAR  What changed: how much to take   15 Units, Subcutaneous, Nightly      lisinopril 40 MG tablet  Commonly known as: PRINIVILZESTRIL  What changed: when to take this   40 mg, Oral, Daily         Continue These Medications      Instructions Start Date   allopurinol 100 MG tablet  Commonly known as: ZYLOPRIM   100 mg, Oral, Nightly      dilTIAZem  MG 24 hr capsule  Commonly known as: CARDIZEM CD   180 mg, Oral, Daily      empagliflozin 10 MG tablet tablet  Commonly known as: JARDIANCE   10 mg, " Oral, Daily      fenofibrate 145 MG tablet  Commonly known as: TRICOR   145 mg, Oral, Nightly      rosuvastatin 20 MG tablet  Commonly known as: CRESTOR   20 mg, Oral, Nightly      Trulicity 3 MG/0.5ML solution pen-injector  Generic drug: Dulaglutide   3 mg, Subcutaneous, Weekly      vitamin D 1.25 MG (79975 UT) capsule capsule  Commonly known as: ERGOCALCIFEROL   50,000 Units, Oral, 2 Times Weekly         Stop These Medications    carBAMazepine 200 MG tablet  Commonly known as: TEGretol            Discharge Diet:    As prior    Activity at Discharge:    As prior    Discharge Care Plan/Instructions:   Follow-up with PCP.  Take medications as prescribed.  Would recommend outpatient stress testing when at baseline if no recent testing and or due.    Follow-up Appointments:   No future appointments.    Test Results Pending at Discharge: None    Electronically signed by Delvin Robin DO, 06/16/22, 08:42 CDT.    Time: 32 minutes

## 2022-06-16 NOTE — PLAN OF CARE
Goal Outcome Evaluation:  Plan of Care Reviewed With: patient        Progress: no change  Outcome Evaluation: PATIENT BEGAN SHIFT WITH CP 4/10. TYLENOL GIVEN WITH GOOD RESULTS. OFFERED TO CALL MD AND GET ORDERS FOR SOMETHING ELSE, BUT PATIENT SAID HIS PAIN WAS IMPROVED. BUN/CREAT IMPROVING. NS INFUSING  ML/HR. ZOFRAN GIVEN ONCE. UP AD ARABELLA. SATS WNL ON RA. VSS. CONT TO MONITOR.

## 2022-06-16 NOTE — PAYOR COMM NOTE
"[place] [facility] 6-16-22      Amadeo Gleason (62 y.o. Male)             Date of Birth   1960    Social Security Number       Address   [address] [place] [id number]    Home Phone   [phone]    MRN   [id number]       Gnosticism   Episcopal    Marital Status                               Admission Date   6/14/22    Admission Type   Emergency    Admitting Provider   Delvin Robin DO    Attending Provider       Department, Room/Bed   [facility] 4B, 402/1       Discharge Date   6/16/2022    Discharge Disposition   Home or Self Care    Discharge Destination   Home                            Attending Provider: (none)   Allergies: No Known Allergies    Isolation: None   Infection: None   Code Status: Prior   Advance Care Planning Activity    Ht: 167.6 cm (66\")   Wt: 84.1 kg (185 lb 6.4 oz)    Admission Cmt: None   Principal Problem: GANESH (acute kidney injury) (HCC) [N17.9]                 Active Insurance as of 6/14/2022     Primary Coverage     Payor Plan Insurance Group Employer/Plan Group    [facility] [employer] [facility] BY ALEJANDRO [id number]     Payor Plan Address Payor Plan Phone Number Payor Plan Fax Number Effective Dates    [address]   1/1/2021 - None Entered    [facility] [id number]       Subscriber Name Subscriber Birth Date Member ID       AMADEO GLEASON 1960 [id number]                 Emergency Contacts      (Rel.) Home Phone Work Phone Mobile Phone    Tessy Mercado (Spouse) [phone] -- [phone]    LAURA MERCADO (Spouse) -- -- [phone]               Discharge Summary      Delvin Robin DO at 06/16/22 0842              [facility] Medicine Services  DISCHARGE SUMMARY       Date of Admission: 6/14/2022  Date of Discharge:  6/16/2022  Primary Care Physician: Provider, No Known    Presenting Problem/Chief Complaint:  Hypotension/heat exposure    Final Discharge Diagnoses:  Active Hospital Problems    " Diagnosis    • **GANESH (acute kidney injury) (HCC)    • Hyperlipidemia    • Heat exposure    • Hyponatremia    • Elevated troponin    • Dehydration    • Type 2 diabetes mellitus with hyperglycemia, with long-term current use of insulin (HCC)    • Alcoholism (HCC)    • Essential hypertension        Consults:   none    Procedures Performed: none    Pertinent Test Results:     Imaging Results (All)     Procedure Component Value Units Date/Time    XR Chest 1 View [[id number]] Collected: 06/14/22 1632     Updated: 06/14/22 1635    Narrative:      XR CHEST 1 VW- 6/14/2022 4:08 PM CDT     HISTORY: Chest Pain protocol     COMPARISON: Chest exam dated 7/12/2020.     FINDINGS:      No lung consolidation. No pleural effusion or pneumothorax. The  cardiomediastinal silhouette and pulmonary vascularity are within normal  limits. The osseous structures and surrounding soft tissues demonstrate  no acute abnormality.       Impression:      1. No radiographic evidence of acute cardiopulmonary process.        This report was finalized on 06/14/2022 16:32 by Dr Nura Hu, .          LAB RESULTS:      Lab 06/15/22  0029 06/14/22  1546 06/14/22  1503   WBC 8.38 8.66 9.97   HEMOGLOBIN 12.2* 12.6* 12.9*   HEMATOCRIT 36.9* 37.0* 38.6   PLATELETS 198 214 218   NEUTROS ABS  --  5.82 6.79   IMMATURE GRANS (ABS)  --  0.04 0.04   LYMPHS ABS  --  1.58 1.82   MONOS ABS  --  0.89 0.98*   EOS ABS  --  0.27 0.29   MCV 94.4 92.0 93.5         Lab 06/16/22  0543 06/15/22  0029 06/14/22  1503   SODIUM 140 137 129*   POTASSIUM 4.4 4.0 4.9   CHLORIDE 107 98 88*   CO2 27.0 27.0 21.0*   ANION GAP 6.0 12.0 20.0*   BUN 28* 42* 43*   CREATININE 1.21 2.57* 3.77*   EGFR 68.5 27.8* 17.5*   GLUCOSE 128* 210* 265*   CALCIUM 8.6 8.7 9.2   MAGNESIUM  --   --  2.5*   HEMOGLOBIN A1C  --  10.60*  --          Lab 06/16/22  0543 06/15/22  0029 06/14/22  1710 06/14/22  1503   TOTAL PROTEIN  --  6.3  --  7.4   ALBUMIN  --  3.90  --  4.60   GLOBULIN  --  2.4  --  2.8   ALT  (SGPT)  --  23  --  28   AST (SGOT)  --  22  --  28   BILIRUBIN  --  0.3  --  0.4   ALK PHOS  --  71  --  86   LIPASE 191* 212* 213*  --          Lab 06/15/22  0029 06/14/22  1710 06/14/22  1503   TROPONIN T 0.025 0.029 0.038*         Lab 06/15/22  0029   CHOLESTEROL 206*   LDL CHOL 101*   HDL CHOL 42   TRIGLYCERIDES 372*             Brief Urine Lab Results  (Last result in the past 365 days)      Color   Clarity   Blood   Leuk Est   Nitrite   Protein   CREAT   Urine HCG        07/27/21 2237 Yellow   Clear   Negative   Negative   Negative   30 mg/dL (1+)               Microbiology Results (last 10 days)     Procedure Component Value - Date/Time    COVID PRE-OP / PRE-PROCEDURE SCREENING ORDER (NO ISOLATION) - Swab, Nasal Cavity [[id number]]  (Normal) Collected: 06/14/22 2002    Lab Status: Final result Specimen: Swab from Nasal Cavity Updated: 06/14/22 2046    Narrative:      The following orders were created for panel order COVID PRE-OP / PRE-PROCEDURE SCREENING ORDER (NO ISOLATION) - Swab, Nasal Cavity.  Procedure                               Abnormality         Status                     ---------                               -----------         ------                     COVID-19,[facility]...[[id number]]  Normal              Final result                 Please view results for these tests on the individual orders.    COVID-19,[facility] IN-HOUSE,Nasal Swab No Transport Media 3-4 HR TAT - Swab, Nasal Cavity [[id number]]  (Normal) Collected: 06/14/22 2002    Lab Status: Final result Specimen: Swab from Nasal Cavity Updated: 06/14/22 2046     COVID19 Not Detected    Narrative:      Fact sheet for providers: https://www.fda.gov/media/577921/download     Fact sheet for patients: https://www.fda.gov/media/188282/download    Test performed by PCR.    Consider negative results in combination with clinical observations, patient history, and epidemiological information.          Chief Complaint on Day of Discharge:    Follow-up GANESH/mild rhabdo    History of Present Illness on Day of Discharge:   Patient continues to do well.  Blood pressure has improved significantly.  Renal function has improved significantly.  He has no chest pain, shortness of breath, dizziness.  No nausea.  Tolerating p.o.  Afebrile.    Hospital Course:  The patient is a 62 y.o. male with a history of IDDM, hypertension, hyperlipidemia, CKD 3A, alcohol use.  Patient reportedly had been sober for about 7 months but recently had a family party and relapsed.  He drank about 3 days afterwards.  For work he cuts steel plates with blowtorch's.  He resents to the hospital with low blood pressure after several days of working hard outside in the heat doing said job.  In the ER he was found to have GANESH on CKD with a creatinine of 3.77 and a mild CK elevation of 255.  He was also hyponatremic volume depletional 129.  He has small troponin 0.038 but this was considered elevated in the setting of his rhabdo and renal failure.  Repeat troponins were actually normal.  His EKG on arrival had an auto read from the machine as STEMI but the ER provider ran this by Dr. Ko of cardiology who did not feel it was an ischemic EKG.  Patient was not having any chest pain.  Repeat EKG shortly after was normal.  Patient did well here with IV fluid resuscitation over 48 hours.  His sodium normalized.  His muscle markers resolved.  His renal function improved creatinine only down to 1.2 prior to discharge.  His blood pressure med was restarted.  He is tolerating p.o.  This time okay to discharge home with outpatient follow-up.  Of note only getting 15 units of Levemir here and his sugars have been doing very well in the low 100s in the a.m.  Also note Tegretol being discontinued at discharge.  Patient denies a history of seizures and says he takes this for neuropathy but also states he does not take it every day.  Feel at this time it will be pertinent to keep him off this until  "he demonstrates ability to stay off alcohol.  Patient states he is going to be sober going forward as it was prior.  He will continue to follow-up with alcohol center to help with his alcoholism.  He seems to have good insight and desire to stay sober.  Okay to discharge home today.    Condition on Discharge: Stable/improved    Physical Exam on Discharge:  /70 (BP Location: Left arm, Patient Position: Lying)   Pulse 76   Temp 97 °F (36.1 °C) (Oral)   Resp 16   Ht 167.6 cm (66\")   Wt 84.1 kg (185 lb 6.4 oz)   SpO2 95%   BMI 29.92 kg/m²   Physical Exam  GEN: Awake, alert, interactive, in NAD  HEENT:  PERRLA, EOMI, Anicteric, Trachea midline  Lungs:  no wheezing/rales/rhonchi  Heart: RRR, +S1/s2, no rub  ABD: soft, nt/nd, +BS, no guarding/rebound  Extremities: atraumatic, no cyanosis, no edema  Skin: no rashes or lesions  Neuro: AAOx3, no focal deficits  Psych: normal mood & affect    Discharge Disposition:  Home    Discharge Medications:     Discharge Medications      Changes to Medications      Instructions Start Date   Insulin Glargine 100 UNIT/ML injection pen  Commonly known as: LANTUS SOLOSTAR  What changed: how much to take   15 Units, Subcutaneous, Nightly      lisinopril 40 MG tablet  Commonly known as: TIMOTHY SIMPSONSTRIL  What changed: when to take this   40 mg, Oral, Daily         Continue These Medications      Instructions Start Date   allopurinol 100 MG tablet  Commonly known as: ZYLOPRIM   100 mg, Oral, Nightly      dilTIAZem  MG 24 hr capsule  Commonly known as: CARDIZEM CD   180 mg, Oral, Daily      empagliflozin 10 MG tablet tablet  Commonly known as: JARDIANCE   10 mg, Oral, Daily      fenofibrate 145 MG tablet  Commonly known as: TRICOR   145 mg, Oral, Nightly      rosuvastatin 20 MG tablet  Commonly known as: CRESTOR   20 mg, Oral, Nightly      Trulicity 3 MG/0.5ML solution pen-injector  Generic drug: Dulaglutide   3 mg, Subcutaneous, Weekly      vitamin D 1.25 MG (19756 UT) " capsule capsule  Commonly known as: ERGOCALCIFEROL   50,000 Units, Oral, 2 Times Weekly         Stop These Medications    carBAMazepine 200 MG tablet  Commonly known as: TEGretol            Discharge Diet:    As prior    Activity at Discharge:    As prior    Discharge Care Plan/Instructions:   Follow-up with PCP.  Take medications as prescribed.  Would recommend outpatient stress testing when at baseline if no recent testing and or due.    Follow-up Appointments:   No future appointments.    Test Results Pending at Discharge: None    Electronically signed by Delvin Robin DO, 06/16/22, 08:42 CDT.    Time: 32 minutes          Electronically signed by Delvin Robin DO at 06/16/22 4145

## 2022-06-17 NOTE — OUTREACH NOTE
Prep Survey    Flowsheet Row Responses   [facility] facility patient discharged from? [place]   Is LACE score < 7 ? No   Emergency Room discharge w/ pulse ox? No   Eligibility Readm Mgmt   Discharge diagnosis acute kidney injury   Does the patient have one of the following disease processes/diagnoses(primary or secondary)? Other   Does the patient have Home health ordered? No   Is there a DME ordered? No   Prep survey completed? Yes          MAKENZIE WERNER - Registered Nurse

## 2022-06-28 ENCOUNTER — READMISSION MANAGEMENT (OUTPATIENT)
Dept: CALL CENTER | Facility: HOSPITAL | Age: 62
End: 2022-06-28

## 2022-06-28 NOTE — OUTREACH NOTE
Medical Week 2 Survey    Flowsheet Row Responses   [facility] patient discharged from? [place]   Does the patient have one of the following disease processes/diagnoses(primary or secondary)? Other   Week 2 attempt successful? No   Unsuccessful attempts Attempt 1          JANIYA PHILIP - Licensed Nurse

## 2022-06-30 ENCOUNTER — READMISSION MANAGEMENT (OUTPATIENT)
Dept: CALL CENTER | Facility: HOSPITAL | Age: 62
End: 2022-06-30

## 2022-06-30 NOTE — OUTREACH NOTE
Medical Week 3 Survey    Flowsheet Row Responses   [facility] patient discharged from? [place]   Does the patient have one of the following disease processes/diagnoses(primary or secondary)? Other   Week 3 attempt successful? Yes   Call start time 1323   Call end time 1332   If primary language is not English what is the name and relationship or agency of  used? son states pt speaks English, no , spouse however, Jo Ann, needs    Person spoke with today (if not patient) and relationship sonUrmila reviewed with patient/caregiver? Yes   Is the patient having any side effects they believe may be caused by any medication additions or changes? No   Does the patient have all medications ordered at discharge? Yes   Is the patient taking all medications as directed (includes completed medication regime)? Yes   Does the patient have a primary care provider?  Yes   Does the patient have an appointment with their PCP within 7 days of discharge? No   What is preventing the patient from scheduling follow up appointments within 7 days of discharge? Haven't had time   Nursing Interventions Advised patient to make appointment   Has home health visited the patient within 72 hours of discharge? N/A   Psychosocial issues? No   Did the patient receive a copy of their discharge instructions? Yes   Nursing interventions Reviewed instructions with patient   What is the patient's perception of their health status since discharge? Improving   Is the patient/caregiver able to teach back signs and symptoms related to disease process for when to call PCP? Yes   Is the patient/caregiver able to teach back signs and symptoms related to disease process for when to call 911? Yes   Is the patient/caregiver able to teach back the hierarchy of who to call/visit for symptoms/problems? PCP, Specialist, Home health nurse, Urgent Care, ED, 911 Yes   If the patient is a current smoker, are they able to  teach back resources for cessation? Not a smoker   Week 3 Call Completed? Yes          BRITTANY RENDON - Registered Nurse

## 2022-07-12 ENCOUNTER — READMISSION MANAGEMENT (OUTPATIENT)
Dept: CALL CENTER | Facility: HOSPITAL | Age: 62
End: 2022-07-12

## 2022-07-12 NOTE — OUTREACH NOTE
Medical Week 4 Survey    Flowsheet Row Responses   [facility] facility patient discharged from? [place]   Does the patient have one of the following disease processes/diagnoses(primary or secondary)? Other   Week 4 attempt successful? No   Discharge diagnosis acute kidney injury   If primary language is not English what is the name and relationship or agency of  used? son states pt speaks English, no , spouse however, Jo Ann, needs           FERNANDA RENDON - Registered Nurse

## 2022-12-02 ENCOUNTER — HOSPITAL ENCOUNTER (OUTPATIENT)
Dept: GENERAL RADIOLOGY | Facility: HOSPITAL | Age: 62
Discharge: HOME | End: 2022-12-02

## 2022-12-02 PROCEDURE — 73660 X-RAY EXAM OF TOE(S): CPT

## 2022-12-05 NOTE — PROGRESS NOTES
[facility] - PODIATRY    Today's Date: 12/07/2022     Patient Name: Amadeo Alexander  MRN: [id number]  CSN: [id number]  PCP: Provider, No Known  Referring Provider: Janett Delgadillo APRN    SUBJECTIVE     Chief Complaint   Patient presents with   • Establish Care     Janett Delgadillo APRN-12/02/2022-BROKEN GREAT TOE- pt states last Friday dropped a peace of steel scrap on right great toe, xrays in system- pt denies pain at present unless touched    • Diabetes     Hasnt checked     HPI: Amadeo Alexander, a 62 y.o.male, comes to clinic as a(n) new patient complaining of fracture of right great toe. Patient has h/o DM, ED, ETOH, GERD, Gout, HLD, HTN, Pancreatitis. Patient is NIDDM and unsure of last BG level. Last recorded A1C in June 2022 was >10%. States that last Friday while he was working he had a piece of scrap metal fall on his right great toe. Was seen in urgent care where x-rays confirmed distal tuft fracture. He was placed in surgical shoe and referred to us. States pain has improved and he has been wearing shoe and bearing most weight on the heel. Notes that urgent care attempted to drain subungual hematoma but was unable to get any fluid collection removed from under nail. Denies pain unless direct pressure applied to toe. Relates previous treatment(s) including application of surgical shoe. Denies any constitutional symptoms. No other pedal complaints at this time.    Past Medical History:   Diagnosis Date   • DM (diabetes mellitus) (HCC)    • ED (erectile dysfunction)    • ETOH abuse    • GERD (gastroesophageal reflux disease)    • Gout    • H. pylori infection    • Hepatic steatosis    • History of pancreatitis 1/6/2017    secondary to etoh.    • Hyperlipidemia    • Hypertension    • Mixed hyperlipidemia    • Pancreatitis, alcoholic, acute      Past Surgical History:   Procedure Laterality Date   • UPPER GASTROINTESTINAL ENDOSCOPY  03/2016    noting gastritis, duodenitis,  esophagitis. dr whitten      Family History   Problem Relation Age of Onset   • Diabetes Mother    • Benign prostatic hyperplasia Father      Social History     Socioeconomic History   • Marital status:    Tobacco Use   • Smoking status: Never   • Smokeless tobacco: Never   Vaping Use   • Vaping Use: Never used   Substance and Sexual Activity   • Alcohol use: Yes     Comment: occ   • Drug use: No   • Sexual activity: Defer     No Known Allergies  Current Outpatient Medications   Medication Sig Dispense Refill   • Alcohol Swabs (Easy Touch Alcohol Prep Medium) 70 % pads      • allopurinol (ZYLOPRIM) 100 MG tablet Take 100 mg by mouth Every Night.     • B-D UF III MINI PEN NEEDLES 31G X 5 MM misc      • Continuous Blood Gluc Sensor (Dexcom G6 Sensor)      • Continuous Blood Gluc Transmit (Dexcom G6 Transmitter) misc      • Diclofenac Sodium (VOLTAREN) 1 % gel gel Apply 4 g topically to the appropriate area as directed 2 (Two) Times a Day.     • dilTIAZem CD (CARDIZEM CD) 180 MG 24 hr capsule Take 180 mg by mouth Daily.     • empagliflozin (JARDIANCE) 10 MG tablet tablet Take 10 mg by mouth Daily.     • fenofibrate (TRICOR) 145 MG tablet Take 145 mg by mouth Every Night.     • FREESTYLE LITE test strip      • Insulin Glargine (LANTUS SOLOSTAR) 100 UNIT/ML injection pen Inject 15 Units under the skin into the appropriate area as directed Every Night. 3 pen 0   • Lancets (freestyle) lancets      • lisinopril (PRINIVIL,ZESTRIL) 40 MG tablet Take 1 tablet by mouth Daily.     • NovoLOG Mix 70/30 FlexPen (70-30) 100 UNIT/ML suspension pen-injector injection Inject  under the skin into the appropriate area as directed 2 (Two) Times a Day Before Meals.     • Ozempic, 0.25 or 0.5 MG/DOSE, 2 MG/1.5ML solution pen-injector Inject 0.25 mg under the skin into the appropriate area as directed 1 (One) Time Per Week.     • rosuvastatin (CRESTOR) 20 MG tablet Take 20 mg by mouth Every Night.     • triamcinolone (KENALOG) 0.1 %  cream      • vitamin D (ERGOCALCIFEROL) 1.25 MG (30426 UT) capsule capsule Take 50,000 Units by mouth 2 (Two) Times a Week.       No current facility-administered medications for this visit.     Review of Systems   Constitutional: Negative for chills and fever.   HENT: Negative for congestion.    Respiratory: Negative for shortness of breath.    Cardiovascular: Negative for chest pain and leg swelling.   Gastrointestinal: Negative for constipation, diarrhea, nausea and vomiting.   Musculoskeletal: Positive for arthralgias. Negative for myalgias.   Skin: Positive for color change. Negative for wound.   Neurological: Negative for numbness.       OBJECTIVE     Vitals:    12/07/22 1309   BP: 148/80   Pulse: 76   SpO2: 98%       PHYSICAL EXAM  GEN:   Accompanied by none.     Foot/Ankle Exam:       General:   Appearance: appears stated age and healthy    Orientation: AAOx3    Affect: appropriate    Gait: unimpaired    Assistance: independent    Shoe Gear:  Casual shoes and surgical shoe    VASCULAR      Right Foot Vascularity   Dorsalis pedis:  2+  Posterior tibial:  2+  Skin Temperature: warm    Edema Grading:  None (edema of great toe)  CFT:  3  Pedal Hair Growth:  Present  Varicosities: none       Left Foot Vascularity   Dorsalis pedis:  2+  Posterior tibial:  2+  Skin Temperature: warm    Edema Grading:  None  CFT:  3  Pedal Hair Growth:  Present  Varicosities: none        NEUROLOGIC     Right Foot Neurologic   Normal sensation    Light touch sensation:  Normal  Vibratory sensation:  Normal  Hot/Cold sensation: normal    Protective Sensation using [place] Monofilament:  10     Left Foot Neurologic   Normal sensation    Light touch sensation:  Normal  Vibratory sensation:  Normal  Hot/cold sensation: normal    Protective Sensation using [place] Monofilament:  10     MUSCULOSKELETAL      Right Foot Musculoskeletal   Ecchymosis:  None and toe 1  Tenderness: toe 1    Arch:  Normal     Left Foot  Musculoskeletal   Ecchymosis:  None  Tenderness: none    Arch:  Normal     MUSCLE STRENGTH     Right Foot Muscle Strength   Foot dorsiflexion:  5  Foot plantar flexion:  5  Foot inversion:  5  Foot eversion:  5     Left Foot Muscle Strength   Foot dorsiflexion:  5  Foot plantar flexion:  5  Foot inversion:  5  Foot eversion:  5     RANGE OF MOTION      Right Foot Range of Motion   Foot and ankle ROM within normal limits    first IP active extension pain      first IP active flexion pain         Left Foot Range of Motion   Foot and ankle ROM within normal limits       DERMATOLOGIC     Right Foot Dermatologic   Skin: color abnormal    Nails: subungual hematoma (mild along proximal hallux nail fold)       Left Foot Dermatologic   Skin: skin intact        RADIOLOGY/NUCLEAR:  XR Toe 2+ View Right    Result Date: 12/2/2022  Narrative: EXAMINATION: Right great toe 12/02/2022  HISTORY: Injury with right great toe pain.  FINDINGS: Three-view exam of the right great toe demonstrates diffuse soft tissue swelling. There is a mildly displaced fracture involving the proximal shaft of the distal phalanx of the great toe. A portion of the fracture line extends out into the terminal tuft. The interphalangeal joint remains intact.      Impression: 1.. Mildly displaced fracture involving the midshaft and more terminal tuft of the great toe. There is associated soft tissue swelling. This report was finalized on 12/02/2022 18:40 by Dr. Ever Nguyen MD.      LABORATORY/CULTURE RESULTS:      PATHOLOGY RESULTS:       ASSESSMENT/PLAN     Diagnoses and all orders for this visit:    1. Closed nondisplaced fracture of distal phalanx of right great toe, initial encounter (Primary)    2. Type 2 diabetes mellitus with hyperglycemia, with long-term current use of insulin (HCC)    3. Foot pain, right      Comprehensive lower extremity examination and evaluation was performed.  Discussed findings and treatment plan including risks, benefits, and  treatment options with patient in detail. Patient agreed with treatment plan.  X-rays from urgent care reviewed. Non-displaced fracture to distal tuft of hallux not involving joint or proximal phalanx.  Continue use of surgical shoe for another 5-6 weeks. Try to bear most weight on heel.   Advised patient that these fractures typically are self limiting and heal spontaneously with time. Advised that if pain persists beyon 7-8 weeks after the injury to return to office and additional imaging can be completed at that time.    An After Visit Summary was printed and given to the patient at discharge, including (if requested) any available informative/educational handouts regarding diagnosis, treatment, or medications. All questions were answered to patient/family satisfaction. Should symptoms fail to improve or worsen they agree to call or return to clinic or to go to the Emergency Department. Discussed the importance of following up with any needed screening tests/labs/specialist appointments and any requested follow-up recommended by me today. Importance of maintaining follow-up discussed and patient accepts that missed appointments can delay diagnosis and potentially lead to worsening of conditions.  Return if symptoms worsen or fail to improve., or sooner if acute issues arise.        This document has been electronically signed by PHONG Burgos on December 7, 2022 13:32 CST

## 2022-12-06 ENCOUNTER — TELEPHONE (OUTPATIENT)
Dept: PODIATRY | Facility: CLINIC | Age: 62
End: 2022-12-06

## 2022-12-06 NOTE — TELEPHONE ENCOUNTER
Called patient to confirmed appointment for 12/07/2022  @ 0437 WITH JAMI DARLING. I was unable to contact patient at this time.

## 2022-12-07 ENCOUNTER — OFFICE VISIT (OUTPATIENT)
Dept: PODIATRY | Facility: CLINIC | Age: 62
End: 2022-12-07

## 2022-12-07 VITALS
HEIGHT: 66 IN | SYSTOLIC BLOOD PRESSURE: 148 MMHG | BODY MASS INDEX: 29.57 KG/M2 | DIASTOLIC BLOOD PRESSURE: 80 MMHG | OXYGEN SATURATION: 98 % | HEART RATE: 76 BPM | WEIGHT: 184 LBS

## 2022-12-07 DIAGNOSIS — M79.671 FOOT PAIN, RIGHT: ICD-10-CM

## 2022-12-07 DIAGNOSIS — E11.65 TYPE 2 DIABETES MELLITUS WITH HYPERGLYCEMIA, WITH LONG-TERM CURRENT USE OF INSULIN: ICD-10-CM

## 2022-12-07 DIAGNOSIS — Z79.4 TYPE 2 DIABETES MELLITUS WITH HYPERGLYCEMIA, WITH LONG-TERM CURRENT USE OF INSULIN: ICD-10-CM

## 2022-12-07 DIAGNOSIS — S92.424A CLOSED NONDISPLACED FRACTURE OF DISTAL PHALANX OF RIGHT GREAT TOE, INITIAL ENCOUNTER: Primary | ICD-10-CM

## 2022-12-07 PROCEDURE — 99214 OFFICE O/P EST MOD 30 MIN: CPT | Performed by: NURSE PRACTITIONER

## 2022-12-07 NOTE — PATIENT INSTRUCTIONS
Continue use of surgical shoe for next 5 weeks minimum. After that, may transition to normal shoes to tolerance. If no improvement at week 7-8 post injury, may consider additional imaging, however, given current appearance there is no concern for prolonged complication. Continue tylenol/ibuprofen as needed for pain.

## 2022-12-08 ENCOUNTER — TELEPHONE (OUTPATIENT)
Dept: PODIATRY | Facility: CLINIC | Age: 62
End: 2022-12-08

## 2022-12-08 NOTE — TELEPHONE ENCOUNTER
Caller: CARLO    Relationship: [employer] -     Best call back number:     What form or medical record are you requesting: WORK NOTE STATING HOW LONG THE PT NEEDS TO BE OFF OF WORK AND RESTRICTIONS    Who is requesting this form or medical record from you: WORK    How would you like to receive the form or medical records (pick-up, mail, fax): FAX  If fax, what is the fax number:   [phone]    Timeframe paperwork needed: ASAP    Additional notes: THE PATIENT'S MANAGER IS CALLING REQUESTING WORK STATUS. SHE STATED THE PATIENT TURNED IN PAPERWORK YESTERDAY AND SHE DID NOT SEE IN ANY OF THE PAPERWORK IF THE PATIENT NEEDED TO BE OFF THE 4 OR 5 WEEKS THAT THE PATIENT TOLD HER HE NEEDED TO BE OFF OF WORK.

## 2023-10-21 ENCOUNTER — HOSPITAL ENCOUNTER (EMERGENCY)
Age: 63
Discharge: HOME | End: 2023-10-22
Attending: STUDENT IN AN ORGANIZED HEALTH CARE EDUCATION/TRAINING PROGRAM

## 2023-10-21 ENCOUNTER — APPOINTMENT (OUTPATIENT)
Dept: CT IMAGING | Age: 63
End: 2023-10-21

## 2023-10-21 DIAGNOSIS — S09.90XA INJURY OF HEAD, INITIAL ENCOUNTER: ICD-10-CM
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
## 2023-10-21 PROCEDURE — 99284 EMERGENCY DEPT VISIT MOD MDM: CPT

## 2023-10-21 PROCEDURE — 90471 IMMUNIZATION ADMIN: CPT | Performed by: STUDENT IN AN ORGANIZED HEALTH CARE EDUCATION/TRAINING PROGRAM

## 2023-10-21 PROCEDURE — 90715 TDAP VACCINE 7 YRS/> IM: CPT | Performed by: STUDENT IN AN ORGANIZED HEALTH CARE EDUCATION/TRAINING PROGRAM

## 2023-10-21 PROCEDURE — 6360000002 HC RX W HCPCS: Performed by: STUDENT IN AN ORGANIZED HEALTH CARE EDUCATION/TRAINING PROGRAM

## 2023-10-21 RX ADMIN — TETANUS TOXOID, REDUCED DIPHTHERIA TOXOID AND ACELLULAR PERTUSSIS VACCINE, ADSORBED 0.5 ML: 5; 2.5; 8; 8; 2.5 SUSPENSION INTRAMUSCULAR at 23:55

## 2023-10-21 ASSESSMENT — PAIN DESCRIPTION - ORIENTATION: ORIENTATION: POSTERIOR

## 2023-10-21 ASSESSMENT — PAIN DESCRIPTION - DESCRIPTORS: DESCRIPTORS: ACHING

## 2023-10-21 ASSESSMENT — PAIN DESCRIPTION - LOCATION: LOCATION: HEAD

## 2023-10-21 ASSESSMENT — PAIN - FUNCTIONAL ASSESSMENT: PAIN_FUNCTIONAL_ASSESSMENT: 0-10

## 2023-10-21 ASSESSMENT — PAIN SCALES - GENERAL: PAINLEVEL_OUTOF10: 5

## 2023-10-22 ENCOUNTER — APPOINTMENT (OUTPATIENT)
Dept: CT IMAGING | Age: 63
End: 2023-10-22

## 2023-10-22 VITALS
DIASTOLIC BLOOD PRESSURE: 78 MMHG | HEIGHT: 66 IN | BODY MASS INDEX: 30.53 KG/M2 | WEIGHT: 190 LBS | RESPIRATION RATE: 18 BRPM | SYSTOLIC BLOOD PRESSURE: 148 MMHG | HEART RATE: 81 BPM | TEMPERATURE: 98.9 F | OXYGEN SATURATION: 95 %

## 2023-10-22 PROCEDURE — 72125 CT NECK SPINE W/O DYE: CPT

## 2023-10-22 PROCEDURE — 70450 CT HEAD/BRAIN W/O DYE: CPT

## 2023-10-22 ASSESSMENT — ENCOUNTER SYMPTOMS
ABDOMINAL PAIN: 0
NAUSEA: 0
SORE THROAT: 0
EYE REDNESS: 0
BLOOD IN STOOL: 0
VOMITING: 0
DIARRHEA: 0
EYE PAIN: 0
COUGH: 0
CHEST TIGHTNESS: 0
SHORTNESS OF BREATH: 0

## 2023-10-22 NOTE — ED NOTES
Pt given 2 warm blankets; No complaints. Pt just curious as to how long he is here for. Informed pt that CT scans have not been read yet.      Natty Sullivan RN  10/22/23 0031       Natty Sullivan RN  10/22/23 2467
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
## 2025-07-31 ENCOUNTER — HOSPITAL ENCOUNTER (OUTPATIENT)
Facility: HOSPITAL | Age: 65
Setting detail: OBSERVATION
Discharge: HOME | End: 2025-08-02
Attending: EMERGENCY MEDICINE | Admitting: INTERNAL MEDICINE
Payer: COMMERCIAL

## 2025-07-31 DIAGNOSIS — N17.9 AKI (ACUTE KIDNEY INJURY): Primary | ICD-10-CM

## 2025-07-31 DIAGNOSIS — E11.65 HYPERGLYCEMIA DUE TO DIABETES MELLITUS: ICD-10-CM

## 2025-07-31 DIAGNOSIS — E87.5 HYPERKALEMIA: ICD-10-CM

## 2025-07-31 LAB
ALBUMIN SERPL-MCNC: 4.2 G/DL (ref 3.5–5.2)
ALBUMIN/GLOB SERPL: 1.3 G/DL
ALP SERPL-CCNC: 98 U/L (ref 39–117)
ALT SERPL W P-5'-P-CCNC: 13 U/L (ref 1–41)
ANION GAP SERPL CALCULATED.3IONS-SCNC: 14 MMOL/L (ref 5–15)
ANION GAP SERPL CALCULATED.3IONS-SCNC: 14 MMOL/L (ref 5–15)
AST SERPL-CCNC: 15 U/L (ref 1–40)
BASOPHILS # BLD AUTO: 0.07 10*3/MM3 (ref 0–0.2)
BASOPHILS NFR BLD AUTO: 0.9 % (ref 0–1.5)
BILIRUB SERPL-MCNC: 0.2 MG/DL (ref 0–1.2)
BUN SERPL-MCNC: 57.8 MG/DL (ref 8–23)
BUN SERPL-MCNC: 63.2 MG/DL (ref 8–23)
BUN/CREAT SERPL: 18 (ref 7–25)
BUN/CREAT SERPL: 19.1 (ref 7–25)
CALCIUM SPEC-SCNC: 8.8 MG/DL (ref 8.6–10.5)
CALCIUM SPEC-SCNC: 8.9 MG/DL (ref 8.6–10.5)
CHLORIDE SERPL-SCNC: 100 MMOL/L (ref 98–107)
CHLORIDE SERPL-SCNC: 106 MMOL/L (ref 98–107)
CO2 SERPL-SCNC: 18 MMOL/L (ref 22–29)
CO2 SERPL-SCNC: 20 MMOL/L (ref 22–29)
CREAT SERPL-MCNC: 3.03 MG/DL (ref 0.76–1.27)
CREAT SERPL-MCNC: 3.52 MG/DL (ref 0.76–1.27)
DEPRECATED RDW RBC AUTO: 46.3 FL (ref 37–54)
EGFRCR SERPLBLD CKD-EPI 2021: 18.7 ML/MIN/1.73
EGFRCR SERPLBLD CKD-EPI 2021: 22.4 ML/MIN/1.73
EOSINOPHIL # BLD AUTO: 0.59 10*3/MM3 (ref 0–0.4)
EOSINOPHIL NFR BLD AUTO: 7.8 % (ref 0.3–6.2)
ERYTHROCYTE [DISTWIDTH] IN BLOOD BY AUTOMATED COUNT: 13.6 % (ref 12.3–15.4)
GLOBULIN UR ELPH-MCNC: 3.2 GM/DL
GLUCOSE BLDC GLUCOMTR-MCNC: 202 MG/DL (ref 70–130)
GLUCOSE BLDC GLUCOMTR-MCNC: 240 MG/DL (ref 70–130)
GLUCOSE SERPL-MCNC: 141 MG/DL (ref 65–99)
GLUCOSE SERPL-MCNC: 412 MG/DL (ref 65–99)
HCT VFR BLD AUTO: 35.9 % (ref 37.5–51)
HGB BLD-MCNC: 11.6 G/DL (ref 13–17.7)
IMM GRANULOCYTES # BLD AUTO: 0.02 10*3/MM3 (ref 0–0.05)
IMM GRANULOCYTES NFR BLD AUTO: 0.3 % (ref 0–0.5)
LYMPHOCYTES # BLD AUTO: 1.74 10*3/MM3 (ref 0.7–3.1)
LYMPHOCYTES NFR BLD AUTO: 22.9 % (ref 19.6–45.3)
MCH RBC QN AUTO: 30.2 PG (ref 26.6–33)
MCHC RBC AUTO-ENTMCNC: 32.3 G/DL (ref 31.5–35.7)
MCV RBC AUTO: 93.5 FL (ref 79–97)
MONOCYTES # BLD AUTO: 0.77 10*3/MM3 (ref 0.1–0.9)
MONOCYTES NFR BLD AUTO: 10.1 % (ref 5–12)
NEUTROPHILS NFR BLD AUTO: 4.42 10*3/MM3 (ref 1.7–7)
NEUTROPHILS NFR BLD AUTO: 58 % (ref 42.7–76)
NRBC BLD AUTO-RTO: 0 /100 WBC (ref 0–0.2)
NT-PROBNP SERPL-MCNC: 81 PG/ML (ref 0–900)
PLATELET # BLD AUTO: 281 10*3/MM3 (ref 140–450)
PMV BLD AUTO: 10.7 FL (ref 6–12)
POTASSIUM SERPL-SCNC: 4.9 MMOL/L (ref 3.5–5.2)
POTASSIUM SERPL-SCNC: 6.4 MMOL/L (ref 3.5–5.2)
PROT SERPL-MCNC: 7.4 G/DL (ref 6–8.5)
RBC # BLD AUTO: 3.84 10*6/MM3 (ref 4.14–5.8)
SODIUM SERPL-SCNC: 132 MMOL/L (ref 136–145)
SODIUM SERPL-SCNC: 140 MMOL/L (ref 136–145)
WBC NRBC COR # BLD AUTO: 7.61 10*3/MM3 (ref 3.4–10.8)

## 2025-07-31 PROCEDURE — 63710000001 INSULIN REGULAR HUMAN PER 5 UNITS: Performed by: FAMILY MEDICINE

## 2025-07-31 PROCEDURE — 80053 COMPREHEN METABOLIC PANEL: CPT | Performed by: EMERGENCY MEDICINE

## 2025-07-31 PROCEDURE — 25010000002 HEPARIN (PORCINE) PER 1000 UNITS: Performed by: INTERNAL MEDICINE

## 2025-07-31 PROCEDURE — 85025 COMPLETE CBC W/AUTO DIFF WBC: CPT | Performed by: EMERGENCY MEDICINE

## 2025-07-31 PROCEDURE — 83880 ASSAY OF NATRIURETIC PEPTIDE: CPT | Performed by: INTERNAL MEDICINE

## 2025-07-31 PROCEDURE — 96361 HYDRATE IV INFUSION ADD-ON: CPT

## 2025-07-31 PROCEDURE — G0378 HOSPITAL OBSERVATION PER HR: HCPCS

## 2025-07-31 PROCEDURE — 99285 EMERGENCY DEPT VISIT HI MDM: CPT | Performed by: EMERGENCY MEDICINE

## 2025-07-31 PROCEDURE — 63710000001 INSULIN GLARGINE PER 5 UNITS: Performed by: INTERNAL MEDICINE

## 2025-07-31 PROCEDURE — 25810000003 SODIUM CHLORIDE 0.9 % SOLUTION: Performed by: INTERNAL MEDICINE

## 2025-07-31 PROCEDURE — 36415 COLL VENOUS BLD VENIPUNCTURE: CPT

## 2025-07-31 PROCEDURE — 25810000003 SODIUM CHLORIDE 0.9 % SOLUTION: Performed by: EMERGENCY MEDICINE

## 2025-07-31 PROCEDURE — 93005 ELECTROCARDIOGRAM TRACING: CPT | Performed by: EMERGENCY MEDICINE

## 2025-07-31 PROCEDURE — 96360 HYDRATION IV INFUSION INIT: CPT

## 2025-07-31 PROCEDURE — 96372 THER/PROPH/DIAG INJ SC/IM: CPT

## 2025-07-31 PROCEDURE — 82948 REAGENT STRIP/BLOOD GLUCOSE: CPT

## 2025-07-31 PROCEDURE — 63710000001 INSULIN LISPRO (HUMAN) PER 5 UNITS: Performed by: INTERNAL MEDICINE

## 2025-07-31 RX ORDER — POLYETHYLENE GLYCOL 3350 17 G/17G
17 POWDER, FOR SOLUTION ORAL DAILY PRN
Status: DISCONTINUED | OUTPATIENT
Start: 2025-07-31 | End: 2025-08-02 | Stop reason: HOSPADM

## 2025-07-31 RX ORDER — ALLOPURINOL 100 MG/1
100 TABLET ORAL NIGHTLY
Status: DISCONTINUED | OUTPATIENT
Start: 2025-07-31 | End: 2025-08-02 | Stop reason: HOSPADM

## 2025-07-31 RX ORDER — MORPHINE SULFATE 2 MG/ML
1 INJECTION, SOLUTION INTRAMUSCULAR; INTRAVENOUS EVERY 4 HOURS PRN
Status: DISCONTINUED | OUTPATIENT
Start: 2025-07-31 | End: 2025-08-02 | Stop reason: HOSPADM

## 2025-07-31 RX ORDER — IBUPROFEN 600 MG/1
1 TABLET ORAL
Status: DISCONTINUED | OUTPATIENT
Start: 2025-07-31 | End: 2025-08-02 | Stop reason: HOSPADM

## 2025-07-31 RX ORDER — ACETAMINOPHEN 160 MG/5ML
650 SOLUTION ORAL EVERY 4 HOURS PRN
Status: DISCONTINUED | OUTPATIENT
Start: 2025-07-31 | End: 2025-08-02 | Stop reason: HOSPADM

## 2025-07-31 RX ORDER — ACETAMINOPHEN 325 MG/1
650 TABLET ORAL EVERY 4 HOURS PRN
Status: DISCONTINUED | OUTPATIENT
Start: 2025-07-31 | End: 2025-08-02 | Stop reason: HOSPADM

## 2025-07-31 RX ORDER — BISACODYL 10 MG
10 SUPPOSITORY, RECTAL RECTAL DAILY PRN
Status: DISCONTINUED | OUTPATIENT
Start: 2025-07-31 | End: 2025-08-02 | Stop reason: HOSPADM

## 2025-07-31 RX ORDER — ROSUVASTATIN CALCIUM 10 MG/1
20 TABLET, COATED ORAL NIGHTLY
Status: DISCONTINUED | OUTPATIENT
Start: 2025-07-31 | End: 2025-08-02 | Stop reason: HOSPADM

## 2025-07-31 RX ORDER — DEXTROSE MONOHYDRATE 25 G/50ML
25 INJECTION, SOLUTION INTRAVENOUS
Status: DISCONTINUED | OUTPATIENT
Start: 2025-07-31 | End: 2025-08-02 | Stop reason: HOSPADM

## 2025-07-31 RX ORDER — ONDANSETRON 2 MG/ML
4 INJECTION INTRAMUSCULAR; INTRAVENOUS EVERY 6 HOURS PRN
Status: DISCONTINUED | OUTPATIENT
Start: 2025-07-31 | End: 2025-08-02 | Stop reason: HOSPADM

## 2025-07-31 RX ORDER — DILTIAZEM HYDROCHLORIDE 180 MG/1
180 CAPSULE, COATED, EXTENDED RELEASE ORAL DAILY
Status: DISCONTINUED | OUTPATIENT
Start: 2025-07-31 | End: 2025-08-02 | Stop reason: HOSPADM

## 2025-07-31 RX ORDER — SODIUM CHLORIDE 9 MG/ML
40 INJECTION, SOLUTION INTRAVENOUS AS NEEDED
Status: DISCONTINUED | OUTPATIENT
Start: 2025-07-31 | End: 2025-08-02 | Stop reason: HOSPADM

## 2025-07-31 RX ORDER — HYDRALAZINE HYDROCHLORIDE 20 MG/ML
10 INJECTION INTRAMUSCULAR; INTRAVENOUS EVERY 4 HOURS PRN
Status: DISCONTINUED | OUTPATIENT
Start: 2025-07-31 | End: 2025-08-02 | Stop reason: HOSPADM

## 2025-07-31 RX ORDER — INSULIN LISPRO 100 [IU]/ML
3-14 INJECTION, SOLUTION INTRAVENOUS; SUBCUTANEOUS
Status: DISCONTINUED | OUTPATIENT
Start: 2025-07-31 | End: 2025-08-02 | Stop reason: HOSPADM

## 2025-07-31 RX ORDER — AMOXICILLIN 250 MG
2 CAPSULE ORAL 2 TIMES DAILY PRN
Status: DISCONTINUED | OUTPATIENT
Start: 2025-07-31 | End: 2025-08-02 | Stop reason: HOSPADM

## 2025-07-31 RX ORDER — BISACODYL 5 MG/1
5 TABLET, DELAYED RELEASE ORAL DAILY PRN
Status: DISCONTINUED | OUTPATIENT
Start: 2025-07-31 | End: 2025-08-02 | Stop reason: HOSPADM

## 2025-07-31 RX ORDER — CHOLECALCIFEROL (VITAMIN D3) 25 MCG
5000 TABLET ORAL DAILY
Status: DISCONTINUED | OUTPATIENT
Start: 2025-07-31 | End: 2025-08-02 | Stop reason: HOSPADM

## 2025-07-31 RX ORDER — NALOXONE HCL 0.4 MG/ML
0.4 VIAL (ML) INJECTION
Status: DISCONTINUED | OUTPATIENT
Start: 2025-07-31 | End: 2025-08-02 | Stop reason: HOSPADM

## 2025-07-31 RX ORDER — HEPARIN SODIUM 5000 [USP'U]/ML
5000 INJECTION, SOLUTION INTRAVENOUS; SUBCUTANEOUS EVERY 12 HOURS SCHEDULED
Status: DISCONTINUED | OUTPATIENT
Start: 2025-07-31 | End: 2025-08-02 | Stop reason: HOSPADM

## 2025-07-31 RX ORDER — SODIUM CHLORIDE 9 MG/ML
125 INJECTION, SOLUTION INTRAVENOUS CONTINUOUS
Status: DISCONTINUED | OUTPATIENT
Start: 2025-07-31 | End: 2025-08-02 | Stop reason: HOSPADM

## 2025-07-31 RX ORDER — SODIUM CHLORIDE 0.9 % (FLUSH) 0.9 %
10 SYRINGE (ML) INJECTION AS NEEDED
Status: DISCONTINUED | OUTPATIENT
Start: 2025-07-31 | End: 2025-08-02 | Stop reason: HOSPADM

## 2025-07-31 RX ORDER — NICOTINE POLACRILEX 4 MG
15 LOZENGE BUCCAL
Status: DISCONTINUED | OUTPATIENT
Start: 2025-07-31 | End: 2025-08-02 | Stop reason: HOSPADM

## 2025-07-31 RX ORDER — ACETAMINOPHEN 650 MG/1
650 SUPPOSITORY RECTAL EVERY 4 HOURS PRN
Status: DISCONTINUED | OUTPATIENT
Start: 2025-07-31 | End: 2025-08-02 | Stop reason: HOSPADM

## 2025-07-31 RX ORDER — SODIUM CHLORIDE 0.9 % (FLUSH) 0.9 %
10 SYRINGE (ML) INJECTION EVERY 12 HOURS SCHEDULED
Status: DISCONTINUED | OUTPATIENT
Start: 2025-07-31 | End: 2025-08-02 | Stop reason: HOSPADM

## 2025-07-31 RX ORDER — HYDROCODONE BITARTRATE AND ACETAMINOPHEN 5; 325 MG/1; MG/1
1 TABLET ORAL EVERY 6 HOURS PRN
Status: DISCONTINUED | OUTPATIENT
Start: 2025-07-31 | End: 2025-08-02 | Stop reason: HOSPADM

## 2025-07-31 RX ORDER — FENOFIBRATE 145 MG/1
145 TABLET, FILM COATED ORAL NIGHTLY
Status: DISCONTINUED | OUTPATIENT
Start: 2025-07-31 | End: 2025-08-02 | Stop reason: HOSPADM

## 2025-07-31 RX ADMIN — INSULIN GLARGINE 15 UNITS: 100 INJECTION, SOLUTION SUBCUTANEOUS at 21:35

## 2025-07-31 RX ADMIN — INSULIN LISPRO 5 UNITS: 100 INJECTION, SOLUTION INTRAVENOUS; SUBCUTANEOUS at 21:35

## 2025-07-31 RX ADMIN — SODIUM CHLORIDE 125 ML/HR: 9 INJECTION, SOLUTION INTRAVENOUS at 19:05

## 2025-07-31 RX ADMIN — Medication 5000 UNITS: at 19:02

## 2025-07-31 RX ADMIN — HEPARIN SODIUM 5000 UNITS: 5000 INJECTION, SOLUTION INTRAVENOUS; SUBCUTANEOUS at 21:35

## 2025-07-31 RX ADMIN — ROSUVASTATIN CALCIUM 20 MG: 10 TABLET, FILM COATED ORAL at 21:35

## 2025-07-31 RX ADMIN — FENOFIBRATE 145 MG: 145 TABLET ORAL at 21:35

## 2025-07-31 RX ADMIN — EMPAGLIFLOZIN 10 MG: 10 TABLET, FILM COATED ORAL at 19:02

## 2025-07-31 RX ADMIN — SODIUM ZIRCONIUM CYCLOSILICATE 10 G: 10 POWDER, FOR SUSPENSION ORAL at 21:35

## 2025-07-31 RX ADMIN — DILTIAZEM HYDROCHLORIDE 180 MG: 180 CAPSULE, EXTENDED RELEASE ORAL at 19:02

## 2025-07-31 RX ADMIN — ALLOPURINOL 100 MG: 100 TABLET ORAL at 21:35

## 2025-07-31 RX ADMIN — SODIUM CHLORIDE 1000 ML: 9 INJECTION, SOLUTION INTRAVENOUS at 17:11

## 2025-07-31 RX ADMIN — INSULIN HUMAN 10 UNITS: 100 INJECTION, SOLUTION PARENTERAL at 17:59

## 2025-07-31 RX ADMIN — POLYETHYLENE GLYCOL 400 AND PROPYLENE GLYCOL 2 DROP: 4; 3 SOLUTION/ DROPS OPHTHALMIC at 23:53

## 2025-07-31 RX ADMIN — Medication 10 ML: at 21:35

## 2025-07-31 NOTE — H&P
[facility] Medicine Services  HISTORY AND PHYSICAL    Date of Admission: 7/31/2025  Primary Care Physician: Provider, No Known    Subjective   Primary Historian: Patient    Chief Complaint: Abnormal lab    History of Present Illness  Mr. Gerson Mercado is a 65-year-old male from home with past medical history of diabetes mellitus, alcohol abuse, GERD, gout, hepatic steatosis, history of pancreatitis secondary to alcohol abuse, hyperlipidemia and hypertension, who was asked to come to the emergency room by his PCP because of abnormal lab in the office, history was provided by patient.  He works in the scrap yard in the extreme weather, he went for a routine checkup with his PCP and blood work was done and he was recalled today to come to the emergency room.  Blood work in the office showed elevated creatinine greater than 3, repeat check in the emergency room showed creatinine of 3.52 and potassium of 6.4.  He was given hyperkalemia protocol in the emergency room, also his blood sugar was elevated as well.      Review of Systems   Otherwise complete ROS reviewed and negative except as mentioned in the HPI.    Past Medical History:   Past Medical History:   Diagnosis Date    DM (diabetes mellitus)     ED (erectile dysfunction)     ETOH abuse     GERD (gastroesophageal reflux disease)     Gout     H. pylori infection     Hepatic steatosis     History of pancreatitis 1/6/2017    secondary to etoh.     Hyperlipidemia     Hypertension     Mixed hyperlipidemia     Pancreatitis, alcoholic, acute      Past Surgical History:  Past Surgical History:   Procedure Laterality Date    UPPER GASTROINTESTINAL ENDOSCOPY  03/2016    noting gastritis, duodenitis, esophagitis. dr whitten      Social History:  reports that he has never smoked. He has never used smokeless tobacco. He reports current alcohol use. He reports that he does not use drugs.    Family History: family history includes Benign  prostatic hyperplasia in his father; Diabetes in his mother.       Allergies:  No Known Allergies    Medications:  Prior to Admission medications    Medication Sig Start Date End Date Taking? Authorizing Provider   Alcohol Swabs (Easy Touch Alcohol Prep Medium) 70 % pads  11/21/22   Lane Nguyen MD   allopurinol (ZYLOPRIM) 100 MG tablet Take 100 mg by mouth Every Night.    Lane Nguyen MD B-D UF III MINI PEN NEEDLES 31G X 5 MM misc  9/13/22   Lane Nguyen MD   Continuous Blood Gluc Sensor (Dexcom G6 Sensor)  11/16/22   Lane Nguyen MD   Continuous Blood Gluc Transmit (Dexcom G6 Transmitter) misc  11/21/22   Lane Nguyen MD   Diclofenac Sodium (VOLTAREN) 1 % gel gel Apply 4 g topically to the appropriate area as directed 2 (Two) Times a Day. 11/14/22   Lane Nguyen MD   dilTIAZem CD (CARDIZEM CD) 180 MG 24 hr capsule Take 180 mg by mouth Daily.    Lane Nguyen MD   empagliflozin (JARDIANCE) 10 MG tablet tablet Take 10 mg by mouth Daily.    Lane Nguyen MD   fenofibrate (TRICOR) 145 MG tablet Take 145 mg by mouth Every Night.    Lane Nguyen MD   FREESTYLE LITE test strip  9/13/22   Lane Nguyen MD   Insulin Glargine (LANTUS SOLOSTAR) 100 UNIT/ML injection pen Inject 15 Units under the skin into the appropriate area as directed Every Night. 6/16/22   Delvin Robin, DO   Lancets (freestyle) lancets  9/13/22   Lane Nguyen MD   lisinopril (PRINIVIL,ZESTRIL) 40 MG tablet Take 1 tablet by mouth Daily. 6/16/22   Delvin Robin,    NovoLOG Mix 70/30 FlexPen (70-30) 100 UNIT/ML suspension pen-injector injection Inject  under the skin into the appropriate area as directed 2 (Two) Times a Day Before Meals. 8/26/22   Lane Nguyen MD   Ozempic, 0.25 or 0.5 MG/DOSE, 2 MG/1.5ML solution pen-injector Inject 0.25 mg under the skin into the appropriate area as directed 1 (One) Time Per Week. 11/16/22   Wendy  "MD Lane   rosuvastatin (CRESTOR) 20 MG tablet Take 20 mg by mouth Every Night.    ProviderLane MD   triamcinolone (KENALOG) 0.1 % cream  11/14/22   ProviderLane MD   vitamin D (ERGOCALCIFEROL) 1.25 MG (17797 UT) capsule capsule Take 50,000 Units by mouth 2 (Two) Times a Week.    ProviderLane MD     I have utilized all available immediate resources to obtain, update, or review the patient's current medications (including all prescriptions, over-the-counter products, herbals, cannabis/cannabidiol products, and vitamin/mineral/dietary (nutritional) supplements).    Objective     Vital Signs: /69   Pulse 60   Temp 98.7 °F (37.1 °C) (Oral)   Resp 16   Ht 167.6 cm (66\")   Wt 82.1 kg (181 lb)   SpO2 97%   BMI 29.21 kg/m²   Physical Exam   GEN: Awake, alert, interactive, in NAD  HEENT: Atraumatic, PERRLA, EOMI, Anicteric, Trachea midline  Lungs: CTAB, no wheezing/rales/rhonchi  Heart: RRR, +S1/s2, no rub  ABD: soft, nt/nd, +BS, no guarding/rebound  Extremities: atraumatic, no cyanosis, no edema  Skin: no rashes or lesions  Neuro: AAOx3, no focal deficits  Psych: normal mood & affect      Results Reviewed:  Lab Results (last 24 hours)       Procedure Component Value Units Date/Time    POC Glucose Once [[id number]]  (Abnormal) Collected: 07/31/25 1817    Specimen: Blood Updated: 07/31/25 1819     Glucose 240 mg/dL      Comment: Serial Number: [id number]:  [id number]       Comprehensive Metabolic Panel [[id number]]  (Abnormal) Collected: 07/31/25 1659    Specimen: Blood from Arm, Right Updated: 07/31/25 1743     Glucose 412 mg/dL      BUN 63.2 mg/dL      Creatinine 3.52 mg/dL      Sodium 132 mmol/L      Potassium 6.4 mmol/L      Chloride 100 mmol/L      CO2 18.0 mmol/L      Calcium 8.9 mg/dL      Total Protein 7.4 g/dL      Albumin 4.2 g/dL      ALT (SGPT) 13 U/L      AST (SGOT) 15 U/L      Alkaline Phosphatase 98 U/L      Total Bilirubin 0.2 mg/dL      Globulin 3.2 gm/dL      " A/G Ratio 1.3 g/dL      BUN/Creatinine Ratio 18.0     Anion Gap 14.0 mmol/L      eGFR 18.7 mL/min/1.73     Narrative:      GFR Categories in Chronic Kidney Disease (CKD)              GFR Category          GFR (mL/min/1.73)    Interpretation  G1                    90 or greater        Normal or high (1)  G2                    60-89                Mild decrease (1)  G3a                   45-59                Mild to moderate decrease  G3b                   30-44                Moderate to severe decrease  G4                    15-29                Severe decrease  G5                    14 or less           Kidney failure    (1)In the absence of evidence of kidney disease, neither GFR category G1 or G2 fulfill the criteria for CKD.    eGFR calculation 2021 CKD-EPI creatinine equation, which does not include race as a factor    CBC & Differential [[id number]]  (Abnormal) Collected: 07/31/25 1659    Specimen: Blood from Arm, Right Updated: 07/31/25 1711    Narrative:      The following orders were created for panel order CBC & Differential.  Procedure                               Abnormality         Status                     ---------                               -----------         ------                     CBC Auto Differential[[id number]]        Abnormal            Final result                 Please view results for these tests on the individual orders.    CBC Auto Differential [[id number]]  (Abnormal) Collected: 07/31/25 1659    Specimen: Blood from Arm, Right Updated: 07/31/25 1711     WBC 7.61 10*3/mm3      RBC 3.84 10*6/mm3      Hemoglobin 11.6 g/dL      Hematocrit 35.9 %      MCV 93.5 fL      MCH 30.2 pg      MCHC 32.3 g/dL      RDW 13.6 %      RDW-SD 46.3 fl      MPV 10.7 fL      Platelets 281 10*3/mm3      Neutrophil % 58.0 %      Lymphocyte % 22.9 %      Monocyte % 10.1 %      Eosinophil % 7.8 %      Basophil % 0.9 %      Immature Grans % 0.3 %      Neutrophils, Absolute 4.42 10*3/mm3      Lymphocytes,  Absolute 1.74 10*3/mm3      Monocytes, Absolute 0.77 10*3/mm3      Eosinophils, Absolute 0.59 10*3/mm3      Basophils, Absolute 0.07 10*3/mm3      Immature Grans, Absolute 0.02 10*3/mm3      nRBC 0.0 /100 WBC           Imaging Results (Last 24 Hours)       ** No results found for the last 24 hours. **          I have personally reviewed and interpreted the radiology studies and ECG obtained at time of admission.     Assessment / Plan   Assessment:   Active Hospital Problems    Diagnosis     **Acute kidney injury        Treatment Plan  The patient will be admitted to my service here at [facility].     -Hyperkalemia secondary to probable acute kidney injury  Patient was given hyperkalemia protocol in the emergency room, will start him on Lokelma twice daily and recheck serum potassium by 10 PM tonight.    - Acute kidney injury versus acute on chronic kidney disease stage III  Patient's last laboratory analysis that was in 2022, therefore no recent labs to compare.  We will get sonogram of the kidney and consult nephrology.  Will start patient on normal saline at 125 mL/h    - Diabetes with hyperglycemia  Patient is last hemoglobin A1c was in 2022 and was 10.8, we will repeat hemoglobin A1c in the morning.  Will continue Lantus and add sliding scale insulin    - Mild pseudohyponatremia  This is likely secondary to ongoing hyperglycemia, expect serum sodium levels to improve with blood control.    -Possible heat exhaustion/rhabdomyolysis  CPK was not done in the emergency room, will get CPK in the morning.  Patient will be on IV fluid resuscitation.    Other chronic medical conditions-  History of alcohol abuse  GERD  Gout  Hepatic steatosis  History of pancreatitis secondary to alcohol abuse  Hyperlipidemia  Hypertension    DVT prophylaxis    Medical Decision Making  Number and Complexity of problems: Multiple  Differential Diagnosis: Acute kidney injury versus acute on chronic kidney disease stage III,  hyperkalemia, hyperglycemia    Conditions and Status        Condition is unchanged.     [facility] Data  External documents reviewed: No  Cardiac tracing (EKG, telemetry) interpretation: Yes  Radiology interpretation: Yes  Labs reviewed: Yes  Any tests that were considered but not ordered: No     Decision rules/scores evaluated (example [id number], [place], etc): No     Discussed with: Patient     Care Planning  Shared decision making: Yes, with patient  Code status and discussions: Full code, discussed with patient    Disposition  Social Determinants of Health that impact treatment or disposition: No  Estimated length of stay is 1 to 2 days.     I confirmed that the patient's advanced care plan is present, code status is documented, and a surrogate decision maker is listed in the patient's medical record.     The patient's surrogate decision maker is wife.     The patient was seen and examined by me on 7/31/2025 at 6:08 PM.    Electronically signed by Randolph Smith MD, 07/31/25, 18:21 CDT.

## 2025-07-31 NOTE — ED PROVIDER NOTES
Subjective   History of Present Illness  Patient is a 65-year-old who was sent to the ED for abnormal labs.  Her BUN/creatinine was elevated and potassium elevated patient has no pain no distress no discomfort  Has been working in a hot environment    Abnormal Lab  Location:  Abnormal labs  Severity:  Moderate  Onset quality:  Gradual  Timing:  Constant  Chronicity:  New  Associated symptoms: no abdominal pain, no chest pain, no congestion, no cough, no diarrhea, no fever, no headaches, no loss of consciousness, no myalgias, no rhinorrhea, no shortness of breath, no sore throat and no vomiting        Review of Systems   Constitutional: Negative.  Negative for fever.   HENT: Negative.  Negative for congestion, rhinorrhea and sore throat.    Eyes: Negative.    Respiratory: Negative.  Negative for cough and shortness of breath.    Cardiovascular: Negative.  Negative for chest pain.   Gastrointestinal:  Negative for abdominal pain, diarrhea and vomiting.   Endocrine: Negative.    Genitourinary: Negative.    Musculoskeletal:  Negative for myalgias.   Skin: Negative.    Neurological: Negative.  Negative for loss of consciousness and headaches.   Hematological: Negative.    All other systems reviewed and are negative.      Past Medical History:   Diagnosis Date    DM (diabetes mellitus)     ED (erectile dysfunction)     ETOH abuse     GERD (gastroesophageal reflux disease)     Gout     H. pylori infection     Hepatic steatosis     History of pancreatitis 1/6/2017    secondary to etoh.     Hyperlipidemia     Hypertension     Mixed hyperlipidemia     Pancreatitis, alcoholic, acute        No Known Allergies    Past Surgical History:   Procedure Laterality Date    UPPER GASTROINTESTINAL ENDOSCOPY  03/2016    noting gastritis, duodenitis, esophagitis. dr whitten        Family History   Problem Relation Age of Onset    Diabetes Mother     Benign prostatic hyperplasia Father        Social History     Socioeconomic History     Marital status:    Tobacco Use    Smoking status: Never    Smokeless tobacco: Never   Vaping Use    Vaping status: Never Used   Substance and Sexual Activity    Alcohol use: Yes     Comment: occ    Drug use: No    Sexual activity: Defer           Objective   Physical Exam  Vitals and nursing note reviewed. Exam conducted with a chaperone present.   Constitutional:       General: He is awake. He is not in acute distress.     Appearance: Normal appearance. He is well-developed. He is not toxic-appearing.   HENT:      Head: Normocephalic and atraumatic.      Nose:      Right Nostril: No epistaxis.      Left Nostril: No epistaxis.   Eyes:      General: Lids are normal. No scleral icterus.     Conjunctiva/sclera: Conjunctivae normal.      Pupils: Pupils are equal, round, and reactive to light.   Neck:      Vascular: No hepatojugular reflux or JVD.   Cardiovascular:      Rate and Rhythm: Normal rate and regular rhythm.      Chest Wall: PMI is not displaced.      Pulses: Normal pulses. No decreased pulses.      Heart sounds: Normal heart sounds. No murmur heard.  Pulmonary:      Effort: Pulmonary effort is normal. No accessory muscle usage or respiratory distress.      Breath sounds: Normal breath sounds. No decreased breath sounds or wheezing.   Abdominal:      General: Abdomen is protuberant. Bowel sounds are normal. There is no distension or abdominal bruit.      Palpations: Abdomen is soft. There is no shifting dullness, fluid wave, mass or pulsatile mass.      Tenderness: There is no abdominal tenderness. There is no right CVA tenderness, left CVA tenderness, guarding or rebound.      Hernia: No hernia is present.   Musculoskeletal:         General: Normal range of motion.      Cervical back: Normal range of motion and neck supple. No rigidity.      Right lower leg: No edema.      Left lower leg: No edema.   Skin:     General: Skin is warm and dry.      Capillary Refill: Capillary refill takes less than 2  seconds.      Coloration: Skin is not cyanotic, jaundiced, mottled or pale.      Findings: No bruising.   Neurological:      General: No focal deficit present.      Mental Status: He is alert and oriented to person, place, and time. Mental status is at baseline.      GCS: GCS eye subscore is 4. GCS verbal subscore is 5. GCS motor subscore is 6.      Cranial Nerves: No cranial nerve deficit.      Sensory: Sensation is intact.      Motor: Motor function is intact.      Deep Tendon Reflexes: Reflexes are normal and symmetric.   Psychiatric:         Behavior: Behavior normal. Behavior is cooperative.         Procedures           ED Course  ED Course as of 07/31/25 1710   Thu Jul 31, 2025 1710 Patient with heat exposure was given IV fluids. [TS]   1710 Turn[address] Dr[address] Selby [TS]      ED Course User Index  [TS] Kofi Goodwin MD                                                       Medical Decision Making  Patient with abnormal lab workup.    Problems Addressed:  GANESH (acute kidney injury): acute illness or injury    Amount and/or Complexity of Data Reviewed  Labs: ordered.  ECG/medicine tests: ordered.    Risk  Prescription drug management.        Final diagnoses:   GANESH (acute kidney injury)       ED Disposition  ED Disposition       None            No follow-up provider specified.       Medication List      No changes were made to your prescriptions during this visit.            Kofi Goodwin MD  07/31/25 1710

## 2025-07-31 NOTE — ED PROVIDER NOTES
Subjective   History of Present Illness    Patient care was transitioned to me with workup pending.  Patient's creatinine level was 2.6 at his doctor's office.  It was even higher here in the emergency room.  Patient's potassium was elevated.  IV insulin was given.  IV fluids were started and the patient.  Case was discussed with the hospitalist on-call.  They have accepted the patient under their services.    Review of Systems    Past Medical History:   Diagnosis Date    DM (diabetes mellitus)     ED (erectile dysfunction)     ETOH abuse     GERD (gastroesophageal reflux disease)     Gout     H. pylori infection     Hepatic steatosis     History of pancreatitis 1/6/2017    secondary to etoh.     Hyperlipidemia     Hypertension     Mixed hyperlipidemia     Pancreatitis, alcoholic, acute        No Known Allergies    Past Surgical History:   Procedure Laterality Date    UPPER GASTROINTESTINAL ENDOSCOPY  03/2016    noting gastritis, duodenitis, esophagitis. dr whitten        Family History   Problem Relation Age of Onset    Diabetes Mother     Benign prostatic hyperplasia Father        Social History     Socioeconomic History    Marital status:    Tobacco Use    Smoking status: Never    Smokeless tobacco: Never   Vaping Use    Vaping status: Never Used   Substance and Sexual Activity    Alcohol use: Yes     Comment: occ    Drug use: No    Sexual activity: Defer           Objective   Physical Exam    Procedures           ED Course  ED Course as of 07/31/25 1755   Thu Jul 31, 2025 1710 Patient with heat exposure was given IV fluids. [TS]   1710 Turnover Dr. Selby [TS]      ED Course User Index  [TS] Kofi Goodwin MD                                                     Lab Results (last 24 hours)       Procedure Component Value Units Date/Time    CBC & Differential [[id number]]  (Abnormal) Collected: 07/31/25 1659    Specimen: Blood from Arm, Right Updated: 07/31/25 1711    Narrative:      The following orders were  created for panel order CBC & Differential.  Procedure                               Abnormality         Status                     ---------                               -----------         ------                     CBC Auto Differential[[id number]]        Abnormal            Final result                 Please view results for these tests on the individual orders.    Comprehensive Metabolic Panel [[id number]]  (Abnormal) Collected: 07/31/25 1659    Specimen: Blood from Arm, Right Updated: 07/31/25 1743     Glucose 412 mg/dL      BUN 63.2 mg/dL      Creatinine 3.52 mg/dL      Sodium 132 mmol/L      Potassium 6.4 mmol/L      Chloride 100 mmol/L      CO2 18.0 mmol/L      Calcium 8.9 mg/dL      Total Protein 7.4 g/dL      Albumin 4.2 g/dL      ALT (SGPT) 13 U/L      AST (SGOT) 15 U/L      Alkaline Phosphatase 98 U/L      Total Bilirubin 0.2 mg/dL      Globulin 3.2 gm/dL      A/G Ratio 1.3 g/dL      BUN/Creatinine Ratio 18.0     Anion Gap 14.0 mmol/L      eGFR 18.7 mL/min/1.73     Narrative:      GFR Categories in Chronic Kidney Disease (CKD)              GFR Category          GFR (mL/min/1.73)    Interpretation  G1                    90 or greater        Normal or high (1)  G2                    60-89                Mild decrease (1)  G3a                   45-59                Mild to moderate decrease  G3b                   30-44                Moderate to severe decrease  G4                    15-29                Severe decrease  G5                    14 or less           Kidney failure    (1)In the absence of evidence of kidney disease, neither GFR category G1 or G2 fulfill the criteria for CKD.    eGFR calculation 2021 CKD-EPI creatinine equation, which does not include race as a factor    CBC Auto Differential [[id number]]  (Abnormal) Collected: 07/31/25 1659    Specimen: Blood from Arm, Right Updated: 07/31/25 1711     WBC 7.61 10*3/mm3      RBC 3.84 10*6/mm3      Hemoglobin 11.6 g/dL      Hematocrit 35.9 %       MCV 93.5 fL      MCH 30.2 pg      MCHC 32.3 g/dL      RDW 13.6 %      RDW-SD 46.3 fl      MPV 10.7 fL      Platelets 281 10*3/mm3      Neutrophil % 58.0 %      Lymphocyte % 22.9 %      Monocyte % 10.1 %      Eosinophil % 7.8 %      Basophil % 0.9 %      Immature Grans % 0.3 %      Neutrophils, Absolute 4.42 10*3/mm3      Lymphocytes, Absolute 1.74 10*3/mm3      Monocytes, Absolute 0.77 10*3/mm3      Eosinophils, Absolute 0.59 10*3/mm3      Basophils, Absolute 0.07 10*3/mm3      Immature Grans, Absolute 0.02 10*3/mm3      nRBC 0.0 /100 WBC           No orders to display     Medications   sodium chloride 0.9 % flush 10 mL (has no administration in time range)   insulin regular (humuLIN R,novoLIN R) injection 10 Units (has no administration in time range)   sodium chloride 0.9 % bolus 1,000 mL (1,000 mL Intravenous New Bag 7/31/25 9070)       Medical Decision Making  Problems Addressed:  GANESH (acute kidney injury): complicated acute illness or injury  Hyperglycemia due to diabetes mellitus: complicated acute illness or injury  Hyperkalemia: complicated acute illness or injury    Amount and/or Complexity of Data Reviewed  Labs: ordered. Decision-making details documented in ED Course.  ECG/medicine tests: ordered. Decision-making details documented in ED Course.    Risk  OTC drugs.  Prescription drug management.  Decision regarding hospitalization.        Final diagnoses:   GANESH (acute kidney injury)   Hyperkalemia   Hyperglycemia due to diabetes mellitus       ED Disposition  ED Disposition       ED Disposition   Decision to Admit    Condition   --    Comment   Level of Care: Remote Telemetry [26]   Diagnosis: Acute kidney injury [347264]   Admitting Physician: KHALIDA STEWART [[id number]]   Attending Physician: KHALIDA STEWART [[id number]]                 No follow-up provider specified.       Medication List      No changes were made to your prescriptions during this visit.            Saúl Selby MD  07/31/25 0078

## 2025-08-01 ENCOUNTER — APPOINTMENT (OUTPATIENT)
Dept: ULTRASOUND IMAGING | Facility: HOSPITAL | Age: 65
End: 2025-08-01
Payer: COMMERCIAL

## 2025-08-01 LAB
ANION GAP SERPL CALCULATED.3IONS-SCNC: 12 MMOL/L (ref 5–15)
BUN SERPL-MCNC: 47 MG/DL (ref 8–23)
BUN/CREAT SERPL: 22.1 (ref 7–25)
CALCIUM SPEC-SCNC: 8.7 MG/DL (ref 8.6–10.5)
CHLORIDE SERPL-SCNC: 111 MMOL/L (ref 98–107)
CK SERPL-CCNC: 76 U/L (ref 20–200)
CO2 SERPL-SCNC: 18 MMOL/L (ref 22–29)
CREAT SERPL-MCNC: 2.13 MG/DL (ref 0.76–1.27)
CREAT UR-MCNC: 28.6 MG/DL
EGFRCR SERPLBLD CKD-EPI 2021: 34.1 ML/MIN/1.73
GLUCOSE BLDC GLUCOMTR-MCNC: 117 MG/DL (ref 70–130)
GLUCOSE BLDC GLUCOMTR-MCNC: 147 MG/DL (ref 70–130)
GLUCOSE BLDC GLUCOMTR-MCNC: 147 MG/DL (ref 70–130)
GLUCOSE BLDC GLUCOMTR-MCNC: 228 MG/DL (ref 70–130)
GLUCOSE SERPL-MCNC: 73 MG/DL (ref 65–99)
HBA1C MFR BLD: 8.7 % (ref 4.8–5.6)
MAGNESIUM SERPL-MCNC: 2.6 MG/DL (ref 1.6–2.4)
POTASSIUM SERPL-SCNC: 4.6 MMOL/L (ref 3.5–5.2)
PROT ?TM UR-MCNC: 17.7 MG/DL
QT INTERVAL: 392 MS
QTC INTERVAL: 394 MS
SODIUM SERPL-SCNC: 141 MMOL/L (ref 136–145)
SODIUM UR-SCNC: 84 MMOL/L

## 2025-08-01 PROCEDURE — 84156 ASSAY OF PROTEIN URINE: CPT | Performed by: INTERNAL MEDICINE

## 2025-08-01 PROCEDURE — 82550 ASSAY OF CK (CPK): CPT | Performed by: INTERNAL MEDICINE

## 2025-08-01 PROCEDURE — 84300 ASSAY OF URINE SODIUM: CPT | Performed by: INTERNAL MEDICINE

## 2025-08-01 PROCEDURE — 96361 HYDRATE IV INFUSION ADD-ON: CPT

## 2025-08-01 PROCEDURE — 96372 THER/PROPH/DIAG INJ SC/IM: CPT

## 2025-08-01 PROCEDURE — 25810000003 SODIUM CHLORIDE 0.9 % SOLUTION: Performed by: INTERNAL MEDICINE

## 2025-08-01 PROCEDURE — 82570 ASSAY OF URINE CREATININE: CPT | Performed by: INTERNAL MEDICINE

## 2025-08-01 PROCEDURE — 63710000001 INSULIN GLARGINE PER 5 UNITS: Performed by: INTERNAL MEDICINE

## 2025-08-01 PROCEDURE — 76775 US EXAM ABDO BACK WALL LIM: CPT

## 2025-08-01 PROCEDURE — G0378 HOSPITAL OBSERVATION PER HR: HCPCS

## 2025-08-01 PROCEDURE — 80048 BASIC METABOLIC PNL TOTAL CA: CPT | Performed by: INTERNAL MEDICINE

## 2025-08-01 PROCEDURE — 63710000001 INSULIN LISPRO (HUMAN) PER 5 UNITS: Performed by: INTERNAL MEDICINE

## 2025-08-01 PROCEDURE — 82948 REAGENT STRIP/BLOOD GLUCOSE: CPT | Performed by: INTERNAL MEDICINE

## 2025-08-01 PROCEDURE — 25010000002 HEPARIN (PORCINE) PER 1000 UNITS: Performed by: INTERNAL MEDICINE

## 2025-08-01 PROCEDURE — 83036 HEMOGLOBIN GLYCOSYLATED A1C: CPT | Performed by: INTERNAL MEDICINE

## 2025-08-01 PROCEDURE — 82948 REAGENT STRIP/BLOOD GLUCOSE: CPT

## 2025-08-01 PROCEDURE — 83735 ASSAY OF MAGNESIUM: CPT | Performed by: INTERNAL MEDICINE

## 2025-08-01 RX ORDER — SODIUM BICARBONATE 650 MG/1
650 TABLET ORAL 2 TIMES DAILY
Status: DISCONTINUED | OUTPATIENT
Start: 2025-08-01 | End: 2025-08-02 | Stop reason: HOSPADM

## 2025-08-01 RX ORDER — LISINOPRIL 20 MG/1
20 TABLET ORAL DAILY
COMMUNITY

## 2025-08-01 RX ORDER — INSULIN GLARGINE 100 [IU]/ML
45-55 INJECTION, SOLUTION SUBCUTANEOUS EVERY MORNING
COMMUNITY

## 2025-08-01 RX ADMIN — SODIUM ZIRCONIUM CYCLOSILICATE 10 G: 10 POWDER, FOR SUSPENSION ORAL at 08:50

## 2025-08-01 RX ADMIN — SODIUM ZIRCONIUM CYCLOSILICATE 10 G: 10 POWDER, FOR SUSPENSION ORAL at 15:55

## 2025-08-01 RX ADMIN — INSULIN GLARGINE 15 UNITS: 100 INJECTION, SOLUTION SUBCUTANEOUS at 20:01

## 2025-08-01 RX ADMIN — Medication 10 ML: at 08:51

## 2025-08-01 RX ADMIN — EMPAGLIFLOZIN 10 MG: 10 TABLET, FILM COATED ORAL at 08:50

## 2025-08-01 RX ADMIN — SODIUM CHLORIDE 125 ML/HR: 9 INJECTION, SOLUTION INTRAVENOUS at 02:25

## 2025-08-01 RX ADMIN — Medication 5000 UNITS: at 08:50

## 2025-08-01 RX ADMIN — SODIUM ZIRCONIUM CYCLOSILICATE 10 G: 10 POWDER, FOR SUSPENSION ORAL at 20:00

## 2025-08-01 RX ADMIN — SODIUM CHLORIDE 125 ML/HR: 9 INJECTION, SOLUTION INTRAVENOUS at 10:24

## 2025-08-01 RX ADMIN — SODIUM CHLORIDE 125 ML/HR: 9 INJECTION, SOLUTION INTRAVENOUS at 18:05

## 2025-08-01 RX ADMIN — INSULIN LISPRO 5 UNITS: 100 INJECTION, SOLUTION INTRAVENOUS; SUBCUTANEOUS at 20:00

## 2025-08-01 RX ADMIN — ROSUVASTATIN CALCIUM 20 MG: 10 TABLET, FILM COATED ORAL at 20:00

## 2025-08-01 RX ADMIN — ALLOPURINOL 100 MG: 100 TABLET ORAL at 20:00

## 2025-08-01 RX ADMIN — HEPARIN SODIUM 5000 UNITS: 5000 INJECTION, SOLUTION INTRAVENOUS; SUBCUTANEOUS at 20:00

## 2025-08-01 RX ADMIN — FENOFIBRATE 145 MG: 145 TABLET ORAL at 20:00

## 2025-08-01 RX ADMIN — SODIUM BICARBONATE 650 MG: 650 TABLET ORAL at 20:00

## 2025-08-01 RX ADMIN — DILTIAZEM HYDROCHLORIDE 180 MG: 180 CAPSULE, EXTENDED RELEASE ORAL at 08:51

## 2025-08-01 RX ADMIN — Medication 10 ML: at 20:01

## 2025-08-01 RX ADMIN — HEPARIN SODIUM 5000 UNITS: 5000 INJECTION, SOLUTION INTRAVENOUS; SUBCUTANEOUS at 08:51

## 2025-08-01 NOTE — PROGRESS NOTES
[facility] Medicine Services  INPATIENT PROGRESS NOTE    Patient Name: Amadeo Alexander  Date of Admission: 7/31/2025  Today's Date: 08/01/25  Length of Stay: 0  Primary Care Physician: Provider, No Known    Subjective   Chief Complaint: Abnormal labs  HPI   Mr. Gerson Mercado is a 65-year-old male from home with past medical history of diabetes mellitus, alcohol abuse, GERD, gout, hepatic steatosis, history of pancreatitis secondary to alcohol abuse, hyperlipidemia and hypertension, who was asked to come to the emergency room by his PCP because of abnormal lab in the office, history was provided by patient.  He works in the scrap yard in the extreme weather, he went for a routine checkup with his PCP and blood work was done and he was recalled today to come to the emergency room.  Blood work in the office showed elevated creatinine greater than 3, repeat check in the emergency room showed creatinine of 3.52 and potassium of 6.4.  He was given hyperkalemia protocol in the emergency room, also his blood sugar was elevated as well.    Patient feels better today.  Has no complaints.        Review of Systems   All pertinent negatives and positives are as above. All other systems have been reviewed and are negative unless otherwise stated.     Objective    Temp:  [97.8 °F (36.6 °C)-98.7 °F (37.1 °C)] 98 °F (36.7 °C)  Heart Rate:  [50-72] 57  Resp:  [16-20] 16  BP: (109-155)/(61-69) 130/66  Physical Exam  General: Patient is alert, awake, oriented x 3 in no apparent distress at the time of examination  HEENT: Normocephalic, atraumatic, pupils are equal reactive to light and accommodation  Neck:, No JVD, no carotid bruits  CVS: S1, S2, regular rhythm and rate  Lungs: Clear to station bilaterally  Abdomen: Soft, nontender, nondistended bowel sounds are present  Extremities: No cyanosis, no clubbing, no edema pulses intact  Neurologic: No focal deficits      Results Review:  I have reviewed  "the labs, radiology results, and diagnostic studies.    Laboratory Data:   Results from last 7 days   Lab Units 07/31/25  1659   WBC 10*3/mm3 7.61   HEMOGLOBIN g/dL 11.6*   HEMATOCRIT % 35.9*   PLATELETS 10*3/mm3 281        Results from last 7 days   Lab Units 08/01/25  0229 07/31/25  1913 07/31/25  1659   SODIUM mmol/L 141 140 132*   POTASSIUM mmol/L 4.6 4.9 6.4*   CHLORIDE mmol/L 111* 106 100   CO2 mmol/L 18.0* 20.0* 18.0*   BUN mg/dL 47.0* 57.8* 63.2*   CREATININE mg/dL 2.13* 3.03* 3.52*   CALCIUM mg/dL 8.7 8.8 8.9   BILIRUBIN mg/dL  --   --  0.2   ALK PHOS U/L  --   --  98   ALT (SGPT) U/L  --   --  13   AST (SGOT) U/L  --   --  15   GLUCOSE mg/dL 73 141* 412*       Culture Data:   No results found for: \"BLOODCX\", \"URINECX\", \"WOUNDCX\", \"MRSACX\", \"RESPCX\", \"STOOLCX\"    Radiology Data:   Imaging Results (Last 24 Hours)       Procedure Component Value Units Date/Time    US Renal Bilateral [[id number]] Collected: 08/01/25 0902     Updated: 08/01/25 0910    Narrative:      EXAMINATION: US RENAL BILATERAL- 8/1/2025 9:02 AM     HISTORY: GANESH vs CKD; N17.9-Acute kidney failure, unspecified;  E87.5-Hyperkalemia; E11.65-Type 2 diabetes mellitus with hyperglycemia.     Images are stored in PACS per institutional protocols and regulatory  requirements.     REPORT: Sonographic images of the kidneys and bladder were obtained.     COMPARISON: Ultrasound of the kidneys 7/28/2021.     The right kidney measures 11.3 x 5.7 x 5.4 cm and demonstrates mildly  increased cortical echogenicity, no mass or hydronephrosis is  identified. Color Doppler images demonstrate appropriate vascular flow  within the right kidney.     The bladder appears within normal limits. The prostate gland is  enlarged, measuring 5.8 x 5.2 x 5.7 cm. The prostate gland has mild mass  effect on the base of the bladder. This may be related to BPH.     The left kidney measures 11.4 x 5.7 x 4.7 cm and demonstrates mildly  increased cortical echogenicity, no mass or " "hydronephrosis is  identified. Color Doppler images demonstrate appropriate vascular flow  within the left kidney.       Impression:      1. There is mildly increased cortical echogenicity of both kidneys,  likely related to \"medical\" renal disease in the history of acute renal  failure. No mass or hydronephrosis is identified.  2. Enlarged prostate gland with mild mass effect on the base of the  bladder. Correlate clinically for BPH and with PSA values.     This report was signed and finalized on 8/1/2025 9:07 AM by Dr. Lester Mcgowan MD.               I have reviewed the patient's current medications.     Assessment/Plan   Assessment  Active Hospital Problems    Diagnosis     **Acute kidney injury        Treatment Plan      -Hyperkalemia : resolved    - Acute kidney injury versus acute on chronic kidney disease stage III  Continue IV fluids, his BUN is down to 47 creatinine 2.13 today  - Diabetes with hyperglycemia  Patient is last hemoglobin A1c was in 2022 and was 10.8, we will A1c from today was 8.7  Will continue Lantus and add sliding scale insulin     - Mild pseudohyponatremia  Resolved     -Possible heat exhaustion/rhabdomyolysis  CPK was not done in the emergency room, will get CPK in the morning.  Patient will be on IV fluid resuscitation.     Other chronic medical conditions-  History of alcohol abuse  GERD  Gout  Hepatic steatosis  History of pancreatitis secondary to alcohol abuse  Hyperlipidemia  Hypertension        Medical Decision Making  Number and Complexity of problems: 6  Differential Diagnosis: None    Conditions and Status        Condition is improving.     [facility] Data  External documents reviewed: None  Cardiac tracing (EKG, telemetry) interpretation: Sinus  Radiology interpretation: Yes  Labs reviewed: Yes  Any tests that were considered but not ordered: None     Decision rules/scores evaluated (example [id number], [place], etc): N/A     Discussed with: Patient, nursing staff     Care " Planning  Shared decision making: Patient  Code status and discussions: Full    Disposition  Social Determinants of Health that impact treatment or disposition: none  I expect the patient to be discharged to home in 1 to 2  days.         Electronically signed by Steven Contreras MD, 08/01/25, 15:51 CDT.
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
## 2025-08-01 NOTE — CONSULTS
Nephrology ([facility]) Consult Note      Patient:  Amadeo Alexander  YOB: 1960  Date of Service: 8/1/2025  MRN: [id number]   Acct: [id number]   Primary Care Physician: Provider, No Known  Advance Directive:   Code Status and Medical Interventions: CPR (Attempt to Resuscitate); Full Support   Ordered at: 07/31/25 1813     Code Status (Patient has no pulse and is not breathing):    CPR (Attempt to Resuscitate)     Medical Interventions (Patient has pulse or is breathing):    Full Support     Level Of Support Discussed With:    Patient     Admit Date: 7/31/2025       Hospital Day: 0  Referring Provider: No ref. provider found      Patient personally seen and examined.  Complete chart including Consults, Notes, Operative Reports, Labs, Cardiology, and Radiology studies reviewed as able.        Subjective:  Amadeo Alexander is a 65 y.o. male for whom we were consulted for evaluation and treatment of acute kidney injury.  Patient recalls no prior nephrologic evaluations or problems.  Baseline creatinine 1.2.  Other history includes diabetes, hypertension, alcohol abuse, GERD, gout, hyperlipidemia.  He had presented to the emergency room at the request of his primary care provider due to abnormal labs.  He was working outside in the heat recently but otherwise feeling well.  Was found to have a creatinine of 3.5 and potassium of 6.4 and subsequently admitted.  He denied current chest pain, shortness of air at rest, nausea or vomiting.  Denied dysuria, hematuria, hemoptysis, rash.  He was taking Jardiance and Ozempic as part of his diabetes management.  Recalled no dietary discretions.  Recalled no NSAID usage.    Allergies:  Patient has no known allergies.    Home Meds:  Medications Prior to Admission   Medication Sig Dispense Refill Last Dose/Taking    Alcohol Swabs (Easy Touch Alcohol Prep Medium) 70 % pads        allopurinol (ZYLOPRIM) 100 MG tablet Take 1 tablet by mouth  Every Night.   Taking    B-D UF III MINI PEN NEEDLES 31G X 5 MM misc        Continuous Blood Gluc Sensor (Dexcom G6 Sensor)        Continuous Blood Gluc Transmit (Dexcom G6 Transmitter) misc        Diclofenac Sodium (VOLTAREN) 1 % gel gel Apply 4 g topically to the appropriate area as directed 2 (Two) Times a Day.   Taking    dilTIAZem CD (CARDIZEM CD) 180 MG 24 hr capsule Take 1 capsule by mouth Daily.   Taking    empagliflozin (JARDIANCE) 25 MG tablet tablet Take 10 mg by mouth Daily.   Taking    fenofibrate (TRICOR) 145 MG tablet Take 1 tablet by mouth Every Night.   Taking    FREESTYLE LITE test strip        Insulin Glargine (LANTUS SOLOSTAR) 100 UNIT/ML injection pen Inject 15 Units under the skin into the appropriate area as directed Every Night. 3 pen 0     Lancets (freestyle) lancets        lisinopril (PRINIVIL,ZESTRIL) 40 MG tablet Take 1 tablet by mouth Daily.       NovoLOG Mix 70/30 FlexPen (70-30) 100 UNIT/ML suspension pen-injector injection Inject  under the skin into the appropriate area as directed 2 (Two) Times a Day Before Meals.       Ozempic, 0.25 or 0.5 MG/DOSE, 2 MG/1.5ML solution pen-injector Inject 0.25 mg under the skin into the appropriate area as directed 1 (One) Time Per Week.       rosuvastatin (CRESTOR) 20 MG tablet Take 20 mg by mouth Every Night.       triamcinolone (KENALOG) 0.1 % cream        vitamin D (ERGOCALCIFEROL) 1.25 MG (93145 UT) capsule capsule Take 50,000 Units by mouth 2 (Two) Times a Week.          Medicines:  Current Facility-Administered Medications   Medication Dose Route Frequency Provider Last Rate Last Admin    acetaminophen (TYLENOL) tablet 650 mg  650 mg Oral Q4H PRN Randolph Smith MD        Or    acetaminophen (TYLENOL) 160 MG/5ML oral solution 650 mg  650 mg Oral Q4H PRN Randolph Smith MD        Or    acetaminophen (TYLENOL) suppository 650 mg  650 mg Rectal Q4H PRN Randolph Smith MD        allopurinol (ZYLOPRIM) tablet 100 mg  100 mg Oral  Nightly Randolph Smith MD   100 mg at 07/31/25 2135    sennosides-docusate (PERICOLACE) 8.6-50 MG per tablet 2 tablet  2 tablet Oral BID PRN Randolph Smith MD        And    polyethylene glycol (MIRALAX) packet 17 g  17 g Oral Daily PRN Randolph Smith MD        And    bisacodyl (DULCOLAX) EC tablet 5 mg  5 mg Oral Daily PRN Randolph Smith MD        And    bisacodyl (DULCOLAX) suppository 10 mg  10 mg Rectal Daily PRN Randolph Smith MD        Calcium Replacement - Follow Nurse / BPA Driven Protocol   Not Applicable PRN Randolph Smith MD        cholecalciferol (VITAMIN D3) tablet 5,000 Units  5,000 Units Oral Daily Randolph Smith MD   5,000 Units at 08/01/25 0850    dextrose (D50W) (25 g/50 mL) IV injection 25 g  25 g Intravenous Q15 Min PRN Randolph Smith MD        dextrose (GLUTOSE) oral gel 15 g  15 g Oral Q15 Min PRN Randolph Smith MD        dilTIAZem CD (CARDIZEM CD) 24 hr capsule 180 mg  180 mg Oral Daily Randolph Smith MD   180 mg at 08/01/25 0851    empagliflozin (JARDIANCE) tablet 10 mg  10 mg Oral Daily Randolph Smith MD   10 mg at 08/01/25 0850    fenofibrate (TRICOR) tablet 145 mg  145 mg Oral Nightly Randolph Smith MD   145 mg at 07/31/25 2135    glucagon (GLUCAGEN) injection 1 mg  1 mg Intramuscular Q15 Min PRN Randolph Smith MD        heparin (porcine) 5000 UNIT/ML injection 5,000 Units  5,000 Units Subcutaneous Q12H Randolph Smith MD   5,000 Units at 08/01/25 0851    hydrALAZINE (APRESOLINE) injection 10 mg  10 mg Intravenous Q4H PRN Randolph Smith MD        HYDROcodone-acetaminophen (NORCO) 5-325 MG per tablet 1 tablet  1 tablet Oral Q6H PRN Randolph Smith MD        insulin glargine (LANTUS, SEMGLEE) injection 15 Units  15 Units Subcutaneous Nightly Randolph Smith MD   15 Units at 07/31/25 2135    Insulin Lispro (humaLOG) injection 3-14 Units  3-14 Units Subcutaneous 4x Daily AC & at Bedtime Randolph Smith MD   5  Units at 07/31/25 2135    Magnesium Low Dose Replacement - Follow Nurse / BPA Driven Protocol   Not Applicable PRN Randolph Smith MD        Morphine sulfate (PF) injection 1 mg  1 mg Intravenous Q4H PRN Randolph Smith MD        And    naloxone (NARCAN) injection 0.4 mg  0.4 mg Intravenous Q5 Min PRN Randolph Smith MD        ondansetron (ZOFRAN) injection 4 mg  4 mg Intravenous Q6H PRN Randolph Smith MD        Phosphorus Replacement - Follow Nurse / BPA Driven Protocol   Not Applicable PRN Randolph Smith MD        polyethyl glycol-propyl glycol (SYSTANE) 0.4-0.3 % ophthalmic solution (artificial tears) 2 drop  2 drop Both Eyes Q1H PRN Joao Pickering MD   2 drop at 07/31/25 2353    Potassium Replacement - Follow Nurse / BPA Driven Protocol   Not Applicable PRN Randolph Smith MD        rosuvastatin (CRESTOR) tablet 20 mg  20 mg Oral Nightly Randolph Smith MD   20 mg at 07/31/25 2135    sodium chloride 0.9 % flush 10 mL  10 mL Intravenous PRN Randolph Smith MD        sodium chloride 0.9 % flush 10 mL  10 mL Intravenous Q12H Randolph Smith MD   10 mL at 08/01/25 0851    sodium chloride 0.9 % flush 10 mL  10 mL Intravenous PRN Randolph Smith MD        sodium chloride 0.9 % infusion 40 mL  40 mL Intravenous PRN Randolph Smith MD        sodium chloride 0.9 % infusion  125 mL/hr Intravenous Continuous Randolph Smith  mL/hr at 08/01/25 1024 125 mL/hr at 08/01/25 1024    sodium zirconium cyclosilicate (LOKELMA) packet 10 g  10 g Oral TID Kvng Tirado MD   10 g at 08/01/25 0850       Past Medical History:  Past Medical History:   Diagnosis Date    DM (diabetes mellitus)     ED (erectile dysfunction)     ETOH abuse     GERD (gastroesophageal reflux disease)     Gout     H. pylori infection     Hepatic steatosis     History of pancreatitis 1/6/2017    secondary to etoh.     Hyperlipidemia     Hypertension     Mixed hyperlipidemia      "Pancreatitis, alcoholic, acute        Past Surgical History:  Past Surgical History:   Procedure Laterality Date    UPPER GASTROINTESTINAL ENDOSCOPY  03/2016    noting gastritis, duodenitis, esophagitis. dr whitten        Family History  Family History   Problem Relation Age of Onset    Diabetes Mother     Benign prostatic hyperplasia Father        Social History  Social History     Socioeconomic History    Marital status:    Tobacco Use    Smoking status: Never    Smokeless tobacco: Never   Vaping Use    Vaping status: Never Used   Substance and Sexual Activity    Alcohol use: Yes     Comment: occ    Drug use: No    Sexual activity: Defer         Review of Systems:  History obtained from chart review and the patient  General ROS: No fever or chills  Respiratory ROS: No cough, shortness of breath, wheezing  Cardiovascular ROS: No chest pain or palpitations  Gastrointestinal ROS: No abdominal pain or melena  Genito-Urinary ROS: No dysuria or hematuria  Psych ROS: No anxiety and depression  14 point ROS reviewed with the patient and negative except as noted above and in the HPI unless unable to obtain.      Objective:  Patient Vitals for the past 24 hrs:   BP Temp Temp src Pulse Resp SpO2 Height Weight   08/01/25 1148 130/66 98 °F (36.7 °C) Oral 57 16 95 % -- --   08/01/25 0726 127/63 98.2 °F (36.8 °C) -- 56 16 96 % -- --   08/01/25 0416 109/61 98.2 °F (36.8 °C) Oral 50 16 96 % -- --   07/31/25 2305 120/66 97.8 °F (36.6 °C) Oral 51 16 97 % -- --   07/31/25 1845 133/66 98 °F (36.7 °C) Oral 66 16 97 % -- --   07/31/25 1806 -- -- -- 60 16 97 % -- --   07/31/25 1805 155/69 -- -- -- -- -- -- --   07/31/25 1606 131/67 98.7 °F (37.1 °C) Oral 72 20 97 % 167.6 cm (66\") 82.1 kg (181 lb)       Intake/Output Summary (Last 24 hours) at 8/1/2025 1309  Last data filed at 8/1/2025 1023  Gross per 24 hour   Intake 523 ml   Output --   Net 523 ml     General: awake/alert   Chest:  clear to auscultation bilaterally without " respiratory distress  CVS: regular rate and rhythm  Abdominal: soft, nontender, positive bowel sounds  Extremities: no cyanosis or edema  Skin: warm and dry without rash      Labs:  Results from last 7 days   Lab Units 07/31/25  1659   WBC 10*3/mm3 7.61   HEMOGLOBIN g/dL 11.6*   HEMATOCRIT % 35.9*   PLATELETS 10*3/mm3 281         Results from last 7 days   Lab Units 08/01/25  0229 07/31/25  1913 07/31/25  1659   SODIUM mmol/L 141 140 132*   POTASSIUM mmol/L 4.6 4.9 6.4*   CHLORIDE mmol/L 111* 106 100   CO2 mmol/L 18.0* 20.0* 18.0*   BUN mg/dL 47.0* 57.8* 63.2*   CREATININE mg/dL 2.13* 3.03* 3.52*   CALCIUM mg/dL 8.7 8.8 8.9   EGFR mL/min/1.73 34.1* 22.4* 18.7*   BILIRUBIN mg/dL  --   --  0.2   ALK PHOS U/L  --   --  98   ALT (SGPT) U/L  --   --  13   AST (SGOT) U/L  --   --  15   GLUCOSE mg/dL 73 141* 412*       Radiology:   Imaging Results (Last 72 Hours)       Procedure Component Value Units Date/Time    US Renal Bilateral [[id number]] Collected: 08/01/25 0902     Updated: 08/01/25 0910    Narrative:      EXAMINATION: US RENAL BILATERAL- 8/1/2025 9:02 AM     HISTORY: GANESH vs CKD; N17.9-Acute kidney failure, unspecified;  E87.5-Hyperkalemia; E11.65-Type 2 diabetes mellitus with hyperglycemia.     Images are stored in PACS per institutional protocols and regulatory  requirements.     REPORT: Sonographic images of the kidneys and bladder were obtained.     COMPARISON: Ultrasound of the kidneys 7/28/2021.     The right kidney measures 11.3 x 5.7 x 5.4 cm and demonstrates mildly  increased cortical echogenicity, no mass or hydronephrosis is  identified. Color Doppler images demonstrate appropriate vascular flow  within the right kidney.     The bladder appears within normal limits. The prostate gland is  enlarged, measuring 5.8 x 5.2 x 5.7 cm. The prostate gland has mild mass  effect on the base of the bladder. This may be related to BPH.     The left kidney measures 11.4 x 5.7 x 4.7 cm and demonstrates mildly  increased  "cortical echogenicity, no mass or hydronephrosis is  identified. Color Doppler images demonstrate appropriate vascular flow  within the left kidney.       Impression:      1. There is mildly increased cortical echogenicity of both kidneys,  likely related to \"medical\" renal disease in the history of acute renal  failure. No mass or hydronephrosis is identified.  2. Enlarged prostate gland with mild mass effect on the base of the  bladder. Correlate clinically for BPH and with PSA values.     This report was signed and finalized on 8/1/2025 9:07 AM by Dr. Lester Mcgowan MD.               Culture:  No results found for: \"BLOODCX\", \"URINECX\", \"WOUNDCX\", \"MRSACX\", \"RESPCX\", \"STOOLCX\"      Assessment   Acute kidney injury  Chronic kidney disease stage II  Hyperkalemia  Diabetes type 2 with hyperglycemia and albuminuria with diabetic nephropathy, uncontrolled by A1c 8.7, on long-term insulin  Pseudohyponatremia  Hypertension  Metabolic acidosis    Plan:  Discussed with patient, nursing  Workup reviewed today  Monitor labs  IV fluids with good improvement, continue with adjustments as per orders  Further testing ordered  Continue to medically manage potassium as needed  Supplement bicarbonate    Thank you for the consult, we appreciate the opportunity to provide care to your patients.  Feel free to contact me if I can be of any further assistance.      Kvng Tirado MD  8/1/2025  13:09 CDT    "

## 2025-08-01 NOTE — PLAN OF CARE
Goal Outcome Evaluation:  Plan of Care Reviewed With: patient        Progress: no change  Outcome Evaluation: Patient oriented x4 with VSS on room air. No c/o pain. Rest has been decent. NPO since mn for renal US. IV fluid infusing. Safety maintained.

## 2025-08-01 NOTE — CASE MANAGEMENT/SOCIAL WORK
Continued Stay Note   [facility]     Patient Name: Amadeo Alexander  MRN: [id number]  Today's Date: 8/1/2025    Admit Date: 7/31/2025    Plan: Home   Discharge Plan       Row Name 08/01/25 1604       Plan    Plan Home    Patient/Family in Agreement with Plan yes    Plan Comments Rec'd a referral that pt was to report to alf today but is in the hospital. Recommended he call the alf to inform them and then they can say if they want the hospital to provide more information. Spoke with pt's nurse and pt did call the alf. No other needs at this time.                   Discharge Codes    No documentation.                       BARBI Molina

## 2025-08-02 VITALS
TEMPERATURE: 97.8 F | DIASTOLIC BLOOD PRESSURE: 66 MMHG | HEART RATE: 59 BPM | WEIGHT: 181 LBS | BODY MASS INDEX: 29.09 KG/M2 | OXYGEN SATURATION: 98 % | HEIGHT: 66 IN | SYSTOLIC BLOOD PRESSURE: 130 MMHG | RESPIRATION RATE: 16 BRPM

## 2025-08-02 LAB
25(OH)D3 SERPL-MCNC: 30.1 NG/ML (ref 30–100)
ANION GAP SERPL CALCULATED.3IONS-SCNC: 11 MMOL/L (ref 5–15)
BASOPHILS # BLD AUTO: 0.06 10*3/MM3 (ref 0–0.2)
BASOPHILS NFR BLD AUTO: 0.8 % (ref 0–1.5)
BUN SERPL-MCNC: 30.9 MG/DL (ref 8–23)
BUN/CREAT SERPL: 19.2 (ref 7–25)
CALCIUM SPEC-SCNC: 8.5 MG/DL (ref 8.6–10.5)
CHLORIDE SERPL-SCNC: 111 MMOL/L (ref 98–107)
CO2 SERPL-SCNC: 20 MMOL/L (ref 22–29)
CREAT SERPL-MCNC: 1.61 MG/DL (ref 0.76–1.27)
DEPRECATED RDW RBC AUTO: 46.7 FL (ref 37–54)
EGFRCR SERPLBLD CKD-EPI 2021: 47.8 ML/MIN/1.73
EOSINOPHIL # BLD AUTO: 0.7 10*3/MM3 (ref 0–0.4)
EOSINOPHIL NFR BLD AUTO: 9.8 % (ref 0.3–6.2)
ERYTHROCYTE [DISTWIDTH] IN BLOOD BY AUTOMATED COUNT: 13.4 % (ref 12.3–15.4)
GLUCOSE BLDC GLUCOMTR-MCNC: 114 MG/DL (ref 70–130)
GLUCOSE SERPL-MCNC: 81 MG/DL (ref 65–99)
HCT VFR BLD AUTO: 33.7 % (ref 37.5–51)
HGB BLD-MCNC: 10.7 G/DL (ref 13–17.7)
IMM GRANULOCYTES # BLD AUTO: 0.02 10*3/MM3 (ref 0–0.05)
IMM GRANULOCYTES NFR BLD AUTO: 0.3 % (ref 0–0.5)
LYMPHOCYTES # BLD AUTO: 2.14 10*3/MM3 (ref 0.7–3.1)
LYMPHOCYTES NFR BLD AUTO: 30.1 % (ref 19.6–45.3)
Lab: NORMAL
MCH RBC QN AUTO: 30.3 PG (ref 26.6–33)
MCHC RBC AUTO-ENTMCNC: 31.8 G/DL (ref 31.5–35.7)
MCV RBC AUTO: 95.5 FL (ref 79–97)
MONOCYTES # BLD AUTO: 0.7 10*3/MM3 (ref 0.1–0.9)
MONOCYTES NFR BLD AUTO: 9.8 % (ref 5–12)
NEUTROPHILS NFR BLD AUTO: 3.5 10*3/MM3 (ref 1.7–7)
NEUTROPHILS NFR BLD AUTO: 49.2 % (ref 42.7–76)
NRBC BLD AUTO-RTO: 0 /100 WBC (ref 0–0.2)
PHOSPHATE SERPL-MCNC: 3.1 MG/DL (ref 2.5–4.5)
PLATELET # BLD AUTO: 258 10*3/MM3 (ref 140–450)
PMV BLD AUTO: 11.1 FL (ref 6–12)
POTASSIUM SERPL-SCNC: 4.2 MMOL/L (ref 3.5–5.2)
PTH-INTACT SERPL-MCNC: 27.6 PG/ML (ref 15–65)
RBC # BLD AUTO: 3.53 10*6/MM3 (ref 4.14–5.8)
SODIUM SERPL-SCNC: 142 MMOL/L (ref 136–145)
WBC NRBC COR # BLD AUTO: 7.12 10*3/MM3 (ref 3.4–10.8)

## 2025-08-02 PROCEDURE — 80048 BASIC METABOLIC PNL TOTAL CA: CPT | Performed by: INTERNAL MEDICINE

## 2025-08-02 PROCEDURE — 25010000002 HEPARIN (PORCINE) PER 1000 UNITS: Performed by: INTERNAL MEDICINE

## 2025-08-02 PROCEDURE — 25810000003 SODIUM CHLORIDE 0.9 % SOLUTION: Performed by: INTERNAL MEDICINE

## 2025-08-02 PROCEDURE — 96361 HYDRATE IV INFUSION ADD-ON: CPT

## 2025-08-02 PROCEDURE — 83970 ASSAY OF PARATHORMONE: CPT | Performed by: INTERNAL MEDICINE

## 2025-08-02 PROCEDURE — G0378 HOSPITAL OBSERVATION PER HR: HCPCS

## 2025-08-02 PROCEDURE — 96372 THER/PROPH/DIAG INJ SC/IM: CPT

## 2025-08-02 PROCEDURE — 84100 ASSAY OF PHOSPHORUS: CPT | Performed by: INTERNAL MEDICINE

## 2025-08-02 PROCEDURE — 82306 VITAMIN D 25 HYDROXY: CPT | Performed by: INTERNAL MEDICINE

## 2025-08-02 PROCEDURE — 82948 REAGENT STRIP/BLOOD GLUCOSE: CPT | Performed by: INTERNAL MEDICINE

## 2025-08-02 PROCEDURE — 85025 COMPLETE CBC W/AUTO DIFF WBC: CPT | Performed by: INTERNAL MEDICINE

## 2025-08-02 RX ORDER — SODIUM BICARBONATE 650 MG/1
650 TABLET ORAL 2 TIMES DAILY
Qty: 14 TABLET | Refills: 0 | Status: SHIPPED | OUTPATIENT
Start: 2025-08-02 | End: 2025-08-09

## 2025-08-02 RX ADMIN — Medication 5000 UNITS: at 08:07

## 2025-08-02 RX ADMIN — SODIUM ZIRCONIUM CYCLOSILICATE 10 G: 10 POWDER, FOR SUSPENSION ORAL at 08:07

## 2025-08-02 RX ADMIN — SODIUM BICARBONATE 650 MG: 650 TABLET ORAL at 08:07

## 2025-08-02 RX ADMIN — DILTIAZEM HYDROCHLORIDE 180 MG: 180 CAPSULE, EXTENDED RELEASE ORAL at 08:07

## 2025-08-02 RX ADMIN — EMPAGLIFLOZIN 10 MG: 10 TABLET, FILM COATED ORAL at 08:07

## 2025-08-02 RX ADMIN — ACETAMINOPHEN 650 MG: 325 TABLET, FILM COATED ORAL at 00:58

## 2025-08-02 RX ADMIN — Medication 10 ML: at 08:09

## 2025-08-02 RX ADMIN — SODIUM CHLORIDE 125 ML/HR: 9 INJECTION, SOLUTION INTRAVENOUS at 02:15

## 2025-08-02 RX ADMIN — HEPARIN SODIUM 5000 UNITS: 5000 INJECTION, SOLUTION INTRAVENOUS; SUBCUTANEOUS at 08:07

## 2025-08-02 NOTE — DISCHARGE SUMMARY
"      [facility]  DISCHARGE SUMMARY       Date of Admission: 7/31/2025  Date of Discharge:  8/2/2025  Primary Care Physician: Provider, No Known    Presenting Problem/History of Present Illness:  Abnormal labs     Final Discharge Diagnoses:  Active Hospital Problems    Diagnosis     **Acute kidney injury        Consults: nephrology     Procedures Performed: none    Pertinent Test Results:   No results found for this or any previous visit.      Imaging Results (All)       Procedure Component Value Units Date/Time    US Renal Bilateral [[id number]] Collected: 08/01/25 0902     Updated: 08/01/25 0910    Narrative:      EXAMINATION: US RENAL BILATERAL- 8/1/2025 9:02 AM     HISTORY: GANESH vs CKD; N17.9-Acute kidney failure, unspecified;  E87.5-Hyperkalemia; E11.65-Type 2 diabetes mellitus with hyperglycemia.     Images are stored in PACS per institutional protocols and regulatory  requirements.     REPORT: Sonographic images of the kidneys and bladder were obtained.     COMPARISON: Ultrasound of the kidneys 7/28/2021.     The right kidney measures 11.3 x 5.7 x 5.4 cm and demonstrates mildly  increased cortical echogenicity, no mass or hydronephrosis is  identified. Color Doppler images demonstrate appropriate vascular flow  within the right kidney.     The bladder appears within normal limits. The prostate gland is  enlarged, measuring 5.8 x 5.2 x 5.7 cm. The prostate gland has mild mass  effect on the base of the bladder. This may be related to BPH.     The left kidney measures 11.4 x 5.7 x 4.7 cm and demonstrates mildly  increased cortical echogenicity, no mass or hydronephrosis is  identified. Color Doppler images demonstrate appropriate vascular flow  within the left kidney.       Impression:      1. There is mildly increased cortical echogenicity of both kidneys,  likely related to \"medical\" renal disease in the history of acute renal  failure. No mass or " hydronephrosis is identified.  2. Enlarged prostate gland with mild mass effect on the base of the  bladder. Correlate clinically for BPH and with PSA values.     This report was signed and finalized on 8/1/2025 9:07 AM by Dr. Lester Mcgowan MD.             LAB RESULTS:      Lab 08/02/25  0506 07/31/25  1659   WBC 7.12 7.61   HEMOGLOBIN 10.7* 11.6*   HEMATOCRIT 33.7* 35.9*   PLATELETS 258 281   NEUTROS ABS 3.50 4.42   IMMATURE GRANS (ABS) 0.02 0.02   LYMPHS ABS 2.14 1.74   MONOS ABS 0.70 0.77   EOS ABS 0.70* 0.59*   MCV 95.5 93.5         Lab 08/02/25  0506 08/01/25  0229 07/31/25  1913 07/31/25  1659   SODIUM 142 141 140 132*   POTASSIUM 4.2 4.6 4.9 6.4*   CHLORIDE 111* 111* 106 100   CO2 20.0* 18.0* 20.0* 18.0*   ANION GAP 11.0 12.0 14.0 14.0   BUN 30.9* 47.0* 57.8* 63.2*   CREATININE 1.61* 2.13* 3.03* 3.52*   EGFR 47.8* 34.1* 22.4* 18.7*   GLUCOSE 81 73 141* 412*   CALCIUM 8.5* 8.7 8.8 8.9   MAGNESIUM  --  2.6*  --   --    PHOSPHORUS 3.1  --   --   --    HEMOGLOBIN A1C  --  8.70*  --   --          Lab 07/31/25  1659   TOTAL PROTEIN 7.4   ALBUMIN 4.2   GLOBULIN 3.2   ALT (SGPT) 13   AST (SGOT) 15   BILIRUBIN 0.2   ALK PHOS 98         Lab 07/31/25  1659   PROBNP 81.0                 Brief Urine Lab Results  (Last result in the past 365 days)        Color   Clarity   Blood   Leuk Est   Nitrite   Protein   CREAT   Urine HCG        08/01/25 1557             28.6               Microbiology Results (last 10 days)       ** No results found for the last 240 hours. **            Hospital Course:       Mr. Gerson Mercado is a 65-year-old male from home with past medical history of diabetes mellitus, alcohol abuse, GERD, gout, hepatic steatosis, history of pancreatitis secondary to alcohol abuse, hyperlipidemia and hypertension, who was asked to come to the emergency room by his PCP because of abnormal lab in the office, history was provided by patient.  He works in the [employer] in the extreme weather, he went for a routine  "checkup with his PCP and blood work was done and he was recalled today to come to the emergency room.  Blood work in the office showed elevated creatinine greater than 3, repeat check in the emergency room showed creatinine of 3.52 and potassium of 6.4.  He was given hyperkalemia protocol in the emergency room, also his blood sugar was elevated as well.    Patient was placed on IV fluids  ,sodium bicarbonate , Bun 30.9 and creatinine of 1.61, was able to urinate , patient was cleared to be discharged home today    Physical Exam on Discharge:  /66 (BP Location: Left arm, Patient Position: Sitting)   Pulse 59   Temp 97.8 °F (36.6 °C) (Oral)   Resp 16   Ht 167.6 cm (66\")   Wt 82.1 kg (181 lb)   SpO2 98%   BMI 29.21 kg/m²   Physical Exam    General: Patient is alert, awake, oriented x 3 in no apparent distress at the time of examination  HEENT: Normocephalic, atraumatic, pupils are equal reactive to light and accommodation  Neck:, No JVD, no carotid bruits  CVS: S1, S2, regular rhythm and rate  Lungs: Clear to station bilaterally  Abdomen: Soft, nontender, nondistended bowel sounds are present  Extremities: No cyanosis, no clubbing, no edema pulses intact  Neurologic: No focal deficits         Condition on Discharge: stable     Discharge Disposition:  Home or Self Care    Discharge Medications:     Discharge Medications        New Medications        Instructions Start Date   sodium bicarbonate 650 MG tablet   650 mg, Oral, 2 Times Daily             Changes to Medications        Instructions Start Date   insulin glargine 100 UNIT/ML injection  Commonly known as: LANTUS, SEMGLEE   45-55 Units, Subcutaneous, Every Morning             Continue These Medications        Instructions Start Date   allopurinol 100 MG tablet  Commonly known as: ZYLOPRIM   100 mg, Nightly      B-D UF III MINI PEN NEEDLES 31G X 5 MM misc  Generic drug: Insulin Pen Needle   No dose, route, or frequency recorded.      Dexcom G6 Sensor   " No dose, route, or frequency recorded.      Dexcom G6 Transmitter misc   No dose, route, or frequency recorded.      Diclofenac Sodium 1 % gel gel  Commonly known as: VOLTAREN   4 g, 2 Times Daily      dilTIAZem  MG 24 hr capsule  Commonly known as: CARDIZEM CD   180 mg, Daily      Easy Touch Alcohol Prep Medium 70 % pads   No dose, route, or frequency recorded.      empagliflozin 25 MG tablet tablet  Commonly known as: JARDIANCE   10 mg, Daily      fenofibrate 145 MG tablet  Commonly known as: TRICOR   145 mg, Nightly      freestyle lancets   No dose, route, or frequency recorded.      FREESTYLE LITE test strip  Generic drug: glucose blood   No dose, route, or frequency recorded.      lisinopril 20 MG tablet  Commonly known as: PRINIVIL,ZESTRIL   20 mg, Daily      NovoLOG Mix 70/30 FlexPen (70-30) 100 UNIT/ML suspension pen-injector injection  Generic drug: insulin aspart prot & aspart   3-8 Units, Daily With Breakfast      rosuvastatin 20 MG tablet  Commonly known as: CRESTOR   20 mg, Nightly      triamcinolone 0.1 % cream  Commonly known as: KENALOG   No dose, route, or frequency recorded.                   Discharge Diet:   Diet Instructions       Diet: Diabetic Diets; Consistent Carbohydrate; Thin (IDDSI 0)      Discharge Diet: Diabetic Diets    Diabetic Diet: Consistent Carbohydrate    Fluid Consistency: Thin (IDDSI 0)            Activity at Discharge:   Activity Instructions       Activity as Tolerated              Follow-up Appointments:   No future appointments.    Test Results Pending at Discharge: none    Electronically signed by Steven Contreras MD, 08/02/25, 13:01 CDT.    Time: 30 minutes.

## 2025-08-02 NOTE — PLAN OF CARE
Goal Outcome Evaluation:  Plan of Care Reviewed With: patient      Pt doing well. No complaints other than a bit of what seemed to be more described as epigastric pain relieved with tylenol. VSS CTM  Progress: improving